# Patient Record
Sex: MALE | Race: WHITE | NOT HISPANIC OR LATINO | Employment: OTHER | URBAN - NONMETROPOLITAN AREA
[De-identification: names, ages, dates, MRNs, and addresses within clinical notes are randomized per-mention and may not be internally consistent; named-entity substitution may affect disease eponyms.]

---

## 2017-01-27 ENCOUNTER — COMMUNICATION - GICH (OUTPATIENT)
Dept: INTERNAL MEDICINE | Facility: OTHER | Age: 62
End: 2017-01-27

## 2017-01-27 DIAGNOSIS — I10 ESSENTIAL (PRIMARY) HYPERTENSION: ICD-10-CM

## 2017-02-09 ENCOUNTER — COMMUNICATION - GICH (OUTPATIENT)
Dept: INTERNAL MEDICINE | Facility: OTHER | Age: 62
End: 2017-02-09

## 2017-02-09 DIAGNOSIS — Z79.4 LONG TERM CURRENT USE OF INSULIN (H): ICD-10-CM

## 2017-02-09 DIAGNOSIS — E11.65 TYPE 2 DIABETES MELLITUS WITH HYPERGLYCEMIA (H): ICD-10-CM

## 2017-02-15 ENCOUNTER — HISTORY (OUTPATIENT)
Dept: INTERNAL MEDICINE | Facility: OTHER | Age: 62
End: 2017-02-15

## 2017-02-15 ENCOUNTER — AMBULATORY - GICH (OUTPATIENT)
Dept: LAB | Facility: OTHER | Age: 62
End: 2017-02-15

## 2017-02-15 ENCOUNTER — OFFICE VISIT - GICH (OUTPATIENT)
Dept: INTERNAL MEDICINE | Facility: OTHER | Age: 62
End: 2017-02-15

## 2017-02-15 DIAGNOSIS — E78.5 HYPERLIPIDEMIA: ICD-10-CM

## 2017-02-15 DIAGNOSIS — E11.8 TYPE 2 DIABETES MELLITUS WITH COMPLICATIONS (H): ICD-10-CM

## 2017-02-15 DIAGNOSIS — E78.2 MIXED HYPERLIPIDEMIA: ICD-10-CM

## 2017-02-15 DIAGNOSIS — E11.319 TYPE 2 DIABETES MELLITUS WITH RETINOPATHY WITHOUT MACULAR EDEMA (H): ICD-10-CM

## 2017-02-15 DIAGNOSIS — Z79.4 LONG TERM CURRENT USE OF INSULIN (H): ICD-10-CM

## 2017-02-15 DIAGNOSIS — E55.9 VITAMIN D DEFICIENCY: ICD-10-CM

## 2017-02-15 DIAGNOSIS — R39.14 FEELING OF INCOMPLETE BLADDER EMPTYING: ICD-10-CM

## 2017-02-15 DIAGNOSIS — I10 ESSENTIAL (PRIMARY) HYPERTENSION: ICD-10-CM

## 2017-02-15 DIAGNOSIS — G60.9 HEREDITARY AND IDIOPATHIC NEUROPATHY: ICD-10-CM

## 2017-02-15 LAB
A/G RATIO - HISTORICAL: 1.3 (ref 1–2)
ALB RAND URINE - HISTORICAL: 8.4 MG/L
ALBUMIN SERPL-MCNC: 4.3 G/DL (ref 3.5–5.7)
ALP SERPL-CCNC: 69 IU/L (ref 34–104)
ALT (SGPT) - HISTORICAL: 18 IU/L (ref 7–52)
ANION GAP - HISTORICAL: 6 (ref 5–18)
AST SERPL-CCNC: 17 IU/L (ref 13–39)
BACTERIA URINE: NORMAL BACTERIA/HPF
BILIRUB SERPL-MCNC: 0.5 MG/DL (ref 0.3–1)
BILIRUB UR QL: NEGATIVE
BUN SERPL-MCNC: 18 MG/DL (ref 7–25)
BUN/CREAT RATIO - HISTORICAL: 19
CALCIUM SERPL-MCNC: 9.7 MG/DL (ref 8.6–10.3)
CHLORIDE SERPLBLD-SCNC: 105 MMOL/L (ref 98–107)
CHOL/HDL RATIO - HISTORICAL: 3.15
CHOLESTEROL TOTAL: 126 MG/DL
CLARITY, URINE: CLEAR CLARITY
CO2 SERPL-SCNC: 27 MMOL/L (ref 21–31)
COLOR UR: YELLOW COLOR
CREAT SERPL-MCNC: 0.97 MG/DL (ref 0.7–1.3)
CREATININE, URINE - HISTORICAL: 0.29 G/L
EPITHELIAL CELLS: NORMAL EPI/HPF
ERYTHROCYTE [DISTWIDTH] IN BLOOD BY AUTOMATED COUNT: 11.7 % (ref 11.5–15.5)
ESTIMATED AVERAGE GLUCOSE: 174 MG/DL
GFR IF NOT AFRICAN AMERICAN - HISTORICAL: >60 ML/MIN/1.73M2
GLOBULIN - HISTORICAL: 3.2 G/DL (ref 2–3.7)
GLUCOSE SERPL-MCNC: 163 MG/DL (ref 70–105)
GLUCOSE URINE: NEGATIVE MG/DL
HCT VFR BLD AUTO: 46.1 % (ref 37–53)
HDLC SERPL-MCNC: 40 MG/DL (ref 23–92)
HEMOGLOBIN A1C MONITORING (POCT) - HISTORICAL: 7.7 % (ref 4–6.2)
HEMOGLOBIN: 15.4 G/DL (ref 13.5–17.5)
KETONES UR QL: NEGATIVE MG/DL
LDLC SERPL CALC-MCNC: 73 MG/DL
LEUKOCYTE ESTERASE URINE: NEGATIVE
MCH RBC QN AUTO: 29.8 PG (ref 26–34)
MCHC RBC AUTO-ENTMCNC: 33.5 G/DL (ref 32–36)
MCV RBC AUTO: 89 FL (ref 80–100)
MICROALBUMIN, RAND UR - HISTORICAL: 29 MG/G CREAT
NITRITE UR QL STRIP: NEGATIVE
NON-HDL CHOLESTEROL - HISTORICAL: 86 MG/DL
OCCULT BLOOD,URINE - HISTORICAL: ABNORMAL
PATIENT STATUS - HISTORICAL: NORMAL
PH UR: 5.5 [PH]
PLATELET # BLD AUTO: 185 THOU/CU MM (ref 140–440)
PMV BLD: 9.3 FL (ref 6.5–11)
POTASSIUM SERPL-SCNC: 4.5 MMOL/L (ref 3.5–5.1)
PROT SERPL-MCNC: 7.5 G/DL (ref 6.4–8.9)
PROTEIN QUALITATIVE,URINE - HISTORICAL: NEGATIVE MG/DL
PSA TOTAL (DIAGNOSTIC) - HISTORICAL: 1.2 NG/ML
RBC - HISTORICAL: NORMAL /HPF
RED BLOOD COUNT - HISTORICAL: 5.18 MIL/CU MM (ref 4.3–5.9)
SODIUM SERPL-SCNC: 138 MMOL/L (ref 133–143)
SP GR UR STRIP: 1.01
TRIGL SERPL-MCNC: 64 MG/DL
UROBILINOGEN,QUALITATIVE - HISTORICAL: NORMAL EU/DL
WBC - HISTORICAL: NORMAL /HPF
WHITE BLOOD COUNT - HISTORICAL: 7.9 THOU/CU MM (ref 4.5–11)

## 2017-03-10 ENCOUNTER — COMMUNICATION - GICH (OUTPATIENT)
Dept: INTERNAL MEDICINE | Facility: OTHER | Age: 62
End: 2017-03-10

## 2017-03-10 DIAGNOSIS — E11.65 TYPE 2 DIABETES MELLITUS WITH HYPERGLYCEMIA (H): ICD-10-CM

## 2017-03-10 DIAGNOSIS — Z79.4 LONG TERM CURRENT USE OF INSULIN (H): ICD-10-CM

## 2017-05-22 ENCOUNTER — AMBULATORY - GICH (OUTPATIENT)
Dept: LAB | Facility: OTHER | Age: 62
End: 2017-05-22

## 2017-05-22 DIAGNOSIS — E11.319 TYPE 2 DIABETES MELLITUS WITH RETINOPATHY WITHOUT MACULAR EDEMA (H): ICD-10-CM

## 2017-05-22 LAB
A/G RATIO - HISTORICAL: 1.6 (ref 1–2)
ALBUMIN SERPL-MCNC: 4.4 G/DL (ref 3.5–5.7)
ALP SERPL-CCNC: 75 IU/L (ref 34–104)
ALT (SGPT) - HISTORICAL: 17 IU/L (ref 7–52)
ANION GAP - HISTORICAL: 10 (ref 5–18)
AST SERPL-CCNC: 18 IU/L (ref 13–39)
BILIRUB SERPL-MCNC: 0.7 MG/DL (ref 0.3–1)
BUN SERPL-MCNC: 18 MG/DL (ref 7–25)
BUN/CREAT RATIO - HISTORICAL: 18
CALCIUM SERPL-MCNC: 9.5 MG/DL (ref 8.6–10.3)
CHLORIDE SERPLBLD-SCNC: 102 MMOL/L (ref 98–107)
CO2 SERPL-SCNC: 27 MMOL/L (ref 21–31)
CREAT SERPL-MCNC: 0.99 MG/DL (ref 0.7–1.3)
ERYTHROCYTE [DISTWIDTH] IN BLOOD BY AUTOMATED COUNT: 13.1 % (ref 11.5–15.5)
ESTIMATED AVERAGE GLUCOSE: 177 MG/DL
GFR IF NOT AFRICAN AMERICAN - HISTORICAL: >60 ML/MIN/1.73M2
GLOBULIN - HISTORICAL: 2.8 G/DL (ref 2–3.7)
GLUCOSE SERPL-MCNC: 92 MG/DL (ref 70–105)
HCT VFR BLD AUTO: 44.2 % (ref 37–53)
HEMOGLOBIN A1C MONITORING (POCT) - HISTORICAL: 7.8 % (ref 4–6.2)
HEMOGLOBIN: 15.7 G/DL (ref 13.5–17.5)
MCH RBC QN AUTO: 30.9 PG (ref 26–34)
MCHC RBC AUTO-ENTMCNC: 35.5 G/DL (ref 32–36)
MCV RBC AUTO: 87 FL (ref 80–100)
PLATELET # BLD AUTO: 234 THOU/CU MM (ref 140–440)
PMV BLD: 10.4 FL (ref 6.5–11)
POTASSIUM SERPL-SCNC: 4.2 MMOL/L (ref 3.5–5.1)
PROT SERPL-MCNC: 7.2 G/DL (ref 6.4–8.9)
RED BLOOD COUNT - HISTORICAL: 5.08 MIL/CU MM (ref 4.3–5.9)
SODIUM SERPL-SCNC: 139 MMOL/L (ref 133–143)
WHITE BLOOD COUNT - HISTORICAL: 9.6 THOU/CU MM (ref 4.5–11)

## 2017-05-24 ENCOUNTER — HISTORY (OUTPATIENT)
Dept: INTERNAL MEDICINE | Facility: OTHER | Age: 62
End: 2017-05-24

## 2017-05-24 ENCOUNTER — OFFICE VISIT - GICH (OUTPATIENT)
Dept: INTERNAL MEDICINE | Facility: OTHER | Age: 62
End: 2017-05-24

## 2017-05-24 DIAGNOSIS — E11.8 TYPE 2 DIABETES MELLITUS WITH COMPLICATIONS (H): ICD-10-CM

## 2017-05-24 DIAGNOSIS — E78.2 MIXED HYPERLIPIDEMIA: ICD-10-CM

## 2017-05-24 DIAGNOSIS — Z79.4 LONG TERM CURRENT USE OF INSULIN (H): ICD-10-CM

## 2017-05-24 DIAGNOSIS — E55.9 VITAMIN D DEFICIENCY: ICD-10-CM

## 2017-05-24 DIAGNOSIS — E11.319 TYPE 2 DIABETES MELLITUS WITH RETINOPATHY WITHOUT MACULAR EDEMA (H): ICD-10-CM

## 2017-05-24 ASSESSMENT — PATIENT HEALTH QUESTIONNAIRE - PHQ9: SUM OF ALL RESPONSES TO PHQ QUESTIONS 1-9: 0

## 2017-08-29 ENCOUNTER — COMMUNICATION - GICH (OUTPATIENT)
Dept: INTERNAL MEDICINE | Facility: OTHER | Age: 62
End: 2017-08-29

## 2017-08-29 ENCOUNTER — AMBULATORY - GICH (OUTPATIENT)
Dept: LAB | Facility: OTHER | Age: 62
End: 2017-08-29

## 2017-08-29 DIAGNOSIS — I10 ESSENTIAL (PRIMARY) HYPERTENSION: ICD-10-CM

## 2017-08-29 DIAGNOSIS — E78.2 MIXED HYPERLIPIDEMIA: ICD-10-CM

## 2017-08-29 DIAGNOSIS — E11.8 TYPE 2 DIABETES MELLITUS WITH COMPLICATIONS (H): ICD-10-CM

## 2017-08-29 DIAGNOSIS — E55.9 VITAMIN D DEFICIENCY: ICD-10-CM

## 2017-08-29 DIAGNOSIS — Z79.4 LONG TERM CURRENT USE OF INSULIN (H): ICD-10-CM

## 2017-08-29 LAB
A/G RATIO - HISTORICAL: 1.4 (ref 1–2)
ABSOLUTE BASOPHILS - HISTORICAL: 0.1 THOU/CU MM
ABSOLUTE EOSINOPHILS - HISTORICAL: 0.2 THOU/CU MM
ABSOLUTE IMMATURE GRANULOCYTES(METAS,MYELOS,PROS) - HISTORICAL: 0 THOU/CU MM
ABSOLUTE LYMPHOCYTES - HISTORICAL: 3 THOU/CU MM (ref 0.9–2.9)
ABSOLUTE MONOCYTES - HISTORICAL: 0.6 THOU/CU MM
ABSOLUTE NEUTROPHILS - HISTORICAL: 3.6 THOU/CU MM (ref 1.7–7)
ALBUMIN SERPL-MCNC: 4.2 G/DL (ref 3.5–5.7)
ALP SERPL-CCNC: 81 IU/L (ref 34–104)
ALT (SGPT) - HISTORICAL: 17 IU/L (ref 7–52)
ANION GAP - HISTORICAL: 14 (ref 5–18)
AST SERPL-CCNC: 19 IU/L (ref 13–39)
BASOPHILS # BLD AUTO: 0.8 %
BILIRUB SERPL-MCNC: 0.6 MG/DL (ref 0.3–1)
BUN SERPL-MCNC: 13 MG/DL (ref 7–25)
BUN/CREAT RATIO - HISTORICAL: 13
CALCIUM SERPL-MCNC: 9.7 MG/DL (ref 8.6–10.3)
CHLORIDE SERPLBLD-SCNC: 101 MMOL/L (ref 98–107)
CO2 SERPL-SCNC: 25 MMOL/L (ref 21–31)
CREAT SERPL-MCNC: 0.98 MG/DL (ref 0.7–1.3)
EOSINOPHIL NFR BLD AUTO: 2.7 %
ERYTHROCYTE [DISTWIDTH] IN BLOOD BY AUTOMATED COUNT: 13.3 % (ref 11.5–15.5)
ESTIMATED AVERAGE GLUCOSE: 177 MG/DL
GFR IF NOT AFRICAN AMERICAN - HISTORICAL: >60 ML/MIN/1.73M2
GLOBULIN - HISTORICAL: 3.1 G/DL (ref 2–3.7)
GLUCOSE SERPL-MCNC: 210 MG/DL (ref 70–105)
HCT VFR BLD AUTO: 44.8 % (ref 37–53)
HEMOGLOBIN A1C MONITORING (POCT) - HISTORICAL: 7.8 % (ref 4–6.2)
HEMOGLOBIN: 15 G/DL (ref 13.5–17.5)
IMMATURE GRANULOCYTES(METAS,MYELOS,PROS) - HISTORICAL: 0.1 %
LYMPHOCYTES NFR BLD AUTO: 40.6 % (ref 20–44)
MCH RBC QN AUTO: 29.5 PG (ref 26–34)
MCHC RBC AUTO-ENTMCNC: 33.5 G/DL (ref 32–36)
MCV RBC AUTO: 88 FL (ref 80–100)
MONOCYTES NFR BLD AUTO: 7.7 %
NEUTROPHILS NFR BLD AUTO: 48.1 % (ref 42–72)
PLATELET # BLD AUTO: 218 THOU/CU MM (ref 140–440)
PMV BLD: 11.6 FL (ref 6.5–11)
POTASSIUM SERPL-SCNC: 4.6 MMOL/L (ref 3.5–5.1)
PROT SERPL-MCNC: 7.3 G/DL (ref 6.4–8.9)
RED BLOOD COUNT - HISTORICAL: 5.08 MIL/CU MM (ref 4.3–5.9)
SODIUM SERPL-SCNC: 140 MMOL/L (ref 133–143)
WHITE BLOOD COUNT - HISTORICAL: 7.4 THOU/CU MM (ref 4.5–11)

## 2017-08-30 ENCOUNTER — OFFICE VISIT - GICH (OUTPATIENT)
Dept: INTERNAL MEDICINE | Facility: OTHER | Age: 62
End: 2017-08-30

## 2017-08-30 ENCOUNTER — HISTORY (OUTPATIENT)
Dept: INTERNAL MEDICINE | Facility: OTHER | Age: 62
End: 2017-08-30

## 2017-08-30 DIAGNOSIS — E55.9 VITAMIN D DEFICIENCY: ICD-10-CM

## 2017-08-30 DIAGNOSIS — E11.8 TYPE 2 DIABETES MELLITUS WITH COMPLICATIONS (H): ICD-10-CM

## 2017-08-30 DIAGNOSIS — E78.5 HYPERLIPIDEMIA: ICD-10-CM

## 2017-08-30 DIAGNOSIS — E11.319 TYPE 2 DIABETES MELLITUS WITH RETINOPATHY WITHOUT MACULAR EDEMA (H): ICD-10-CM

## 2017-08-30 DIAGNOSIS — Z79.4 LONG TERM CURRENT USE OF INSULIN (H): ICD-10-CM

## 2017-08-30 DIAGNOSIS — I10 ESSENTIAL (PRIMARY) HYPERTENSION: ICD-10-CM

## 2017-08-30 LAB
CHOL/HDL RATIO - HISTORICAL: 3.28
CHOLESTEROL TOTAL: 131 MG/DL
HDLC SERPL-MCNC: 40 MG/DL (ref 23–92)
LDLC SERPL CALC-MCNC: 68 MG/DL
NON-HDL CHOLESTEROL - HISTORICAL: 91 MG/DL
PATIENT STATUS - HISTORICAL: NORMAL
TRIGL SERPL-MCNC: 115 MG/DL

## 2017-08-30 ASSESSMENT — PATIENT HEALTH QUESTIONNAIRE - PHQ9: SUM OF ALL RESPONSES TO PHQ QUESTIONS 1-9: 0

## 2017-08-31 ENCOUNTER — AMBULATORY - GICH (OUTPATIENT)
Dept: SCHEDULING | Facility: OTHER | Age: 62
End: 2017-08-31

## 2017-09-27 ENCOUNTER — AMBULATORY - GICH (OUTPATIENT)
Dept: SCHEDULING | Facility: OTHER | Age: 62
End: 2017-09-27

## 2017-11-28 ENCOUNTER — TRANSFERRED RECORDS (OUTPATIENT)
Dept: MULTI SPECIALTY CLINIC | Facility: CLINIC | Age: 62
End: 2017-11-28

## 2017-11-28 ENCOUNTER — AMBULATORY - GICH (OUTPATIENT)
Dept: SCHEDULING | Facility: OTHER | Age: 62
End: 2017-11-28

## 2017-12-06 ENCOUNTER — AMBULATORY - GICH (OUTPATIENT)
Dept: LAB | Facility: OTHER | Age: 62
End: 2017-12-06

## 2017-12-06 DIAGNOSIS — E11.8 TYPE 2 DIABETES MELLITUS WITH COMPLICATIONS (H): ICD-10-CM

## 2017-12-06 DIAGNOSIS — Z79.4 LONG TERM CURRENT USE OF INSULIN (H): ICD-10-CM

## 2017-12-06 DIAGNOSIS — E78.2 MIXED HYPERLIPIDEMIA: ICD-10-CM

## 2017-12-06 DIAGNOSIS — I10 ESSENTIAL (PRIMARY) HYPERTENSION: ICD-10-CM

## 2017-12-06 LAB
A/G RATIO - HISTORICAL: 1.3 (ref 1–2)
ALBUMIN SERPL-MCNC: 4.2 G/DL (ref 3.5–5.7)
ALP SERPL-CCNC: 75 IU/L (ref 34–104)
ALT (SGPT) - HISTORICAL: 22 IU/L (ref 7–52)
ANION GAP - HISTORICAL: 6 (ref 5–18)
AST SERPL-CCNC: 22 IU/L (ref 13–39)
BILIRUB SERPL-MCNC: 0.5 MG/DL (ref 0.3–1)
BUN SERPL-MCNC: 16 MG/DL (ref 7–25)
BUN/CREAT RATIO - HISTORICAL: 16
CALCIUM SERPL-MCNC: 9.1 MG/DL (ref 8.6–10.3)
CHLORIDE SERPLBLD-SCNC: 104 MMOL/L (ref 98–107)
CHOL/HDL RATIO - HISTORICAL: 3.17
CHOLESTEROL TOTAL: 114 MG/DL
CO2 SERPL-SCNC: 27 MMOL/L (ref 21–31)
CREAT SERPL-MCNC: 1.01 MG/DL (ref 0.7–1.3)
ERYTHROCYTE [DISTWIDTH] IN BLOOD BY AUTOMATED COUNT: 13 % (ref 11.5–15.5)
ESTIMATED AVERAGE GLUCOSE: 160 MG/DL
GFR IF NOT AFRICAN AMERICAN - HISTORICAL: >60 ML/MIN/1.73M2
GLOBULIN - HISTORICAL: 3.2 G/DL (ref 2–3.7)
GLUCOSE SERPL-MCNC: 119 MG/DL (ref 70–105)
HCT VFR BLD AUTO: 46.7 % (ref 37–53)
HDLC SERPL-MCNC: 36 MG/DL (ref 23–92)
HEMOGLOBIN A1C MONITORING (POCT) - HISTORICAL: 7.2 % (ref 4–6.2)
HEMOGLOBIN: 16 G/DL (ref 13.5–17.5)
LDLC SERPL CALC-MCNC: 63 MG/DL
MCH RBC QN AUTO: 30.2 PG (ref 26–34)
MCHC RBC AUTO-ENTMCNC: 34.3 G/DL (ref 32–36)
MCV RBC AUTO: 88 FL (ref 80–100)
NON-HDL CHOLESTEROL - HISTORICAL: 78 MG/DL
PLATELET # BLD AUTO: 231 THOU/CU MM (ref 140–440)
PMV BLD: 10.4 FL (ref 6.5–11)
POTASSIUM SERPL-SCNC: 4.5 MMOL/L (ref 3.5–5.1)
PROT SERPL-MCNC: 7.4 G/DL (ref 6.4–8.9)
PROVIDER ORDERDED STATUS - HISTORICAL: NORMAL
RED BLOOD COUNT - HISTORICAL: 5.29 MIL/CU MM (ref 4.3–5.9)
SODIUM SERPL-SCNC: 137 MMOL/L (ref 133–143)
TRIGL SERPL-MCNC: 74 MG/DL
WHITE BLOOD COUNT - HISTORICAL: 7.7 THOU/CU MM (ref 4.5–11)

## 2017-12-07 ENCOUNTER — COMMUNICATION - GICH (OUTPATIENT)
Dept: INTERNAL MEDICINE | Facility: OTHER | Age: 62
End: 2017-12-07

## 2017-12-07 ENCOUNTER — HISTORY (OUTPATIENT)
Dept: INTERNAL MEDICINE | Facility: OTHER | Age: 62
End: 2017-12-07

## 2017-12-07 ENCOUNTER — OFFICE VISIT - GICH (OUTPATIENT)
Dept: INTERNAL MEDICINE | Facility: OTHER | Age: 62
End: 2017-12-07

## 2017-12-07 DIAGNOSIS — E11.319 TYPE 2 DIABETES MELLITUS WITH RETINOPATHY WITHOUT MACULAR EDEMA (H): ICD-10-CM

## 2017-12-07 DIAGNOSIS — I10 ESSENTIAL (PRIMARY) HYPERTENSION: ICD-10-CM

## 2017-12-07 DIAGNOSIS — E11.8 TYPE 2 DIABETES MELLITUS WITH COMPLICATIONS (H): ICD-10-CM

## 2017-12-07 DIAGNOSIS — E78.2 MIXED HYPERLIPIDEMIA: ICD-10-CM

## 2017-12-07 DIAGNOSIS — E55.9 VITAMIN D DEFICIENCY: ICD-10-CM

## 2017-12-07 DIAGNOSIS — Z79.4 LONG TERM CURRENT USE OF INSULIN (H): ICD-10-CM

## 2017-12-28 NOTE — TELEPHONE ENCOUNTER
Patient Information     Patient Name MRN Sex Oh Mcneal [id number] Male 1955      Telephone Encounter by Stephenie Varghese at 2017 11:30 AM     Author:  Stephenie Varghese Service:  (none) Author Type:  (none)     Filed:  2017 11:30 AM Encounter Date:  2017 Status:  Signed     :  Stephenie Varghese            Lab orders pending.    Stephenie Varghese LPN        2017 11:30 AM

## 2017-12-28 NOTE — PROGRESS NOTES
Patient Information     Patient Name MRN Sex Oh Mcneal [id number] Male 1955      Progress Notes by Moshe Wilson MD at 2017  8:00 AM     Author:  Moshe Wilson MD Service:  (none) Author Type:  Physician     Filed:  2017  9:38 AM Encounter Date:  2017 Status:  Signed     :  Moshe Wilson MD (Physician)            Nursing Notes:   Stephenie Varghese  2017  8:14 AM  Signed  Previous A1C is at goal of <8  HEMOGLOBIN A1C MONITORING (POCT)    Date Value   2017 7.8 % (H)   04/10/2013 6.5 % NGSP (H)     HEMOGLOBIN A1C SCREENING (% Total Hgb)    Date Value   04/10/2014 6.9 (H)     Urine microalbumin:creatine: 0.29  Foot exam with in the past year  Eye exam this past year    Patient is not a current smoker  Patient is on a daily aspirin  Patient is on a Statin.  Blood pressure today of   is not at the goal of <139/89 for diabetics.    Stephenie Varghese LPN..............2017 8:00 AM    Oh Delarosa presents to clinic today for:   Chief Complaint    Patient presents with      Diabetes     HPI: Mr. Delarosa is a 62 y.o. male who presents today for evaluation of above.     (E11.8,  Z79.4) Controlled type 2 diabetes mellitus with complication, with long-term current use of insulin (HC)  (primary encounter diagnosis)  (E78.5) Hyperlipidemia, unspecified hyperlipidemia type  (E11.319) Diabetic retinopathy of left eye associated with type 2 diabetes mellitus, macular edema presence unspecified, unspecified retinopathy severity  (E55.9) Vitamin D deficiency  (I10) Hypertension     Diabetes, currently controlled.  He is only using 15 units of Lantus however.  Advised that he increases up to 18 units for about a week and then 20 units.  A1c is at the upper range of goal, 7.8%.  Her hyperlipidemia, last lipid panel was very well controlled.  He is taking Lipitor 40 mg daily.  Tolerating this well.    Retinopathy, noted that his last diabetic examination, reports this was  stable.    Vitamin D deficiency, taking oral vitamin D.  Labs came back with improved vitamin D levels.    Hypertension, currently well controlled.  Tolerating medications.  Labs obtained yesterday showed kidney function is stable.    Mr. Parks Body mass index is 28.27 kg/(m^2). This is out of the normal range for a 62 y.o. Normal range for ages 18+ is between 18.5 and 24.9. To lose weight we reviewed risks and benefits of appropriate options such as diet, exercise, and medications. Patient's strategy will be  self-directed nutrition plan and self-directed exercise program   BP Readings from Last 1 Encounters:08/30/17 : 138/80  Mr. Parks blood pressure is out of the normal range for adults. Per JNC-8 guidelines normal adult blood pressure is < 120/80, pre-hypertensive is between 120/80 and 139/89, and hypertension is 140/90 or greater. Risks of hypertension were discussed. Patient's strategy will be weight loss, increased activity and reduced salt intake    Functional Capacity: > 4 METS.   Reports that he can climb a flight of stairs without any chest pain/heaviness or shortness of breath.   Patient reports no current symptoms of fevers, chills, nausea/vomiting.   No cough. No shortness of breath.   No change in bowel/bladder habits. No melena, hematochezia. No Hematuria.   No rashes. No palpitations.  No orthopnea/paroxysmal nocturnal dyspnea   No new vision or hearing issues.   No significant mood issues   No bruising.     SHIRLENE:  No flowsheet data found.    PHQ9:  PHQ Depression Screening 5/24/2017 8/30/2017   Date of PHQ exam (doc flow) 5/24/2017 8/30/2017   1. Lack of interest/pleasure 0 - Not at all 0 - Not at all   2. Feeling down/depressed 0 - Not at all 0 - Not at all   PHQ-2 TOTAL SCORE 0 0   3. Trouble sleeping 0 - Not at all 0 - Not at all   4. Decreased energy 0 - Not at all 0 - Not at all   5. Appetite change 0 - Not at all 0 - Not at all   6. Feelings of failure 0 - Not at all 0 - Not at all   7.  Trouble concentrating 0 - Not at all 0 - Not at all   8. Activity level 0 - Not at all 0 - Not at all   9. Hurting yourself 0 - Not at all 0 - Not at all   PHQ-9 TOTAL SCORE 0 0   PHQ-9 Severity Level none none   Functional Impairment not difficult at all not difficult at all   Some recent data might be hidden          I have personally reviewed the past medical history, past surgical history, medications, allergies, family and social history as listed below, on 8/30/2017.    Patient Active Problem List      Diagnosis Date Noted     Controlled type 2 diabetes mellitus with complication, with long-term current use of insulin (HC) 10/25/2016     Chronic back pain 04/30/2015     h/o Leiomyoma of the skin of right breast 04/29/2015     Strain of flexor muscle of right hip 12/17/2014     Actinic keratosis of right temple 12/17/2014     History of nephrolithiasis - about 2013 06/24/2014     Hyperparathyroidism () 06/24/2014     Fatigue 05/06/2014     H/O hyperparathyroidism 04/10/2014     S/P subtotal parathyroidectomy () 04/10/2014     Hypertension 04/10/2014     Vitamin D deficiency 04/10/2014     Increased PTH level 04/10/2014     Diabetic retinopathy associated with type 2 diabetes mellitus () 11/25/2013     Lung nodule 11/25/2013     HEMATURIA UNSPECIFIED 07/18/2012     HYPERLIPIDEMIA 01/03/2011     PERIPHERAL NEUROPATHY      Past Medical History:     Diagnosis  Date     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       atorvastatin (LIPITOR) 40 mg tablet Take 1 tablet by mouth once daily. 90 tablet 3     blood sugar diagnostic (ACCU-CHEK JAMES PLUS TEST STRP) strip Dispense item covered by pt ins. E11.65 IDDM type II, uncontrolled -  "Test 4 times/day. Reason: High A1C 400 Each 3     Blood-Glucose Meter (ACCU-CHEK JAMES PLUS METER) Dispense glucose meter, test strips and lancets covered by the patient insurance. Test 4 times per day. 1 Device 0     CALCIUM CARBONATE/VITAMIN D2 (CALCIUM 500 WITH VITAMIN D ORAL) Take  by mouth once daily. Take two tablets by mouth daily       cholecalciferol (VITAMIN D-3) 2,000 unit capsule 4000 to 5000 IU daily For Vitamin D Deficiency 100 capsule 3     glipiZIDE (GLUCOTROL) 10 mg tablet Take 1 tablet by mouth 2 times daily before meals. 180 tablet 3     glipiZIDE-metFORMIN (METAGLIP) 2.5-250 mg per tablet Take 1 tablet by mouth once daily with a meal. -- With Supper Every Evening 90 tablet 3     insulin glargine (LANTUS SOLOSTAR) 100 unit/mL (3 mL) pen Inject 15-25 Units subcutaneous before bedtime. 20 pen 3     lancets Test 4 times per day. Dx E11.65 Insulin Dependent 400 Each 4     losartan (COZAAR) 50 mg tablet Take 1 tablet by mouth once daily. 90 tablet 3     metFORMIN (GLUCOPHAGE) 1,000 mg tablet Take 1 tablet by mouth 2 times daily with meals. 180 tablet 3     MARJORIE PEN NEEDLE 32 gauge x 5/32\" AS DIRECTED FOR ADMINISTERING INSULIN AT HOME. FOR USE WITH LANTUS INJECTIONS ONCE DAILY. 100 Each 4     Allergies     Allergen  Reactions     Lisinopril Cough     Family History       Problem   Relation Age of Onset     Heart Disease  Father       at age 73, CAD, complications of percutaneous revascularization of abdominal aortic aneurysm        Good Health  Mother      83       Alcohol/Drug  Brother      Alcoholism        Family Status     Relation  Status     Father  at age 73     of CAD, complications of percutaneous revascularization of abdominal aortic aneurysm       Mother Alive    1927      Sister Alive    obese      Sister Alive    obese      Sister Alive    obese      Brother Alive    born , no contact      Brother      Social History     Social History        Marital status:       " "Spouse name: N/A     Number of children:  N/A     Years of education:  N/A     Social History Main Topics       Smoking status: Never Smoker     Smokeless tobacco: Never Used     Alcohol use No     Drug use: Not on file     Sexual activity: Not on file     Other Topics  Concern     Not on file      Social History Narrative     Never a smoker.  Walks regularly,      Pertinent ROS was performed and was negative as noted in HPI above.     EXAM:   Vitals:     08/30/17 0803   BP: 138/80   Pulse: 76   Weight: 89.4 kg (197 lb)     BP Readings from Last 3 Encounters:    08/30/17 138/80   05/24/17 134/88   02/15/17 138/88     Wt Readings from Last 3 Encounters:    08/30/17 89.4 kg (197 lb)   05/24/17 87.7 kg (193 lb 4 oz)   02/15/17 89.4 kg (197 lb 3.2 oz)     Estimated body mass index is 28.27 kg/(m^2) as calculated from the following:    Height as of 5/24/17: 1.778 m (5' 10\").    Weight as of this encounter: 89.4 kg (197 lb).     EXAM:  Constitutional: Pleasant, alert, appropriate appearance for age. No acute distress  ENT: Normocephalic, Atraumatic, Thyroid without nodules or tenderness   Nose/Mouth: Oral pharynx without erythema or exudates, Nose is patent bilaterally, no rhinorrhea and Dental hygeine adequate   Eyes:  Extraocular muscles intact, Sclera non-icteric, Conjunctiva without erythema  Lymphatic Exam: Non-palpable nodes in neck, clavicular regions  Pulmonary: Lungs are clear to auscultation bilaterally, without wheezes or crackles  Cardiovascular Exam: regular rate and rhythm, no pedal edema  Gastrointestinal Exam: Soft, non-tender, non-distended, positive bowel sounds  Integument: No abnormal rashes, sores, or ulcerations noted  Neurologic Exam: CN 3-12 grossly intact   Musculoskeletal Exam: Moves upper and lower extremities symmetrically, No focal weakness  Gait and station appear grossly normal  Psychiatric Exam: Awake and Alert, Affect and mood appropriate  Speech is fluent, Thought process is " normal    INVESTIGATIONS:  Results for orders placed or performed in visit on 08/29/17      Hgb A1c      Result  Value Ref Range    HEMOGLOBIN A1C MONITORING (POCT) 7.8 (H) 4.0 - 6.2 %    ESTIMATED AVERAGE GLUCOSE  177 mg/dL   COMPLETE METABOLIC PANEL      Result  Value Ref Range    SODIUM 140 133 - 143 mmol/L    POTASSIUM 4.6 3.5 - 5.1 mmol/L    CHLORIDE 101 98 - 107 mmol/L    CO2,TOTAL 25 21 - 31 mmol/L    ANION GAP 14 5 - 18                    GLUCOSE 210 (H) 70 - 105 mg/dL    CALCIUM 9.7 8.6 - 10.3 mg/dL    BUN 13 7 - 25 mg/dL    CREATININE 0.98 0.70 - 1.30 mg/dL    BUN/CREAT RATIO           13                    GFR if African American >60 >60 ml/min/1.73m2    GFR if not African American >60 >60 ml/min/1.73m2    ALBUMIN 4.2 3.5 - 5.7 g/dL    PROTEIN,TOTAL 7.3 6.4 - 8.9 g/dL    GLOBULIN                  3.1 2.0 - 3.7 g/dL    A/G RATIO 1.4 1.0 - 2.0                    BILIRUBIN,TOTAL 0.6 0.3 - 1.0 mg/dL    ALK PHOSPHATASE 81 34 - 104 IU/L    ALT (SGPT) 17 7 - 52 IU/L    AST (SGOT) 19 13 - 39 IU/L   CBC WITH AUTO DIFFERENTIAL      Result  Value Ref Range    WHITE BLOOD COUNT         7.4 4.5 - 11.0 thou/cu mm    RED BLOOD COUNT           5.08 4.30 - 5.90 mil/cu mm    HEMOGLOBIN                15.0 13.5 - 17.5 g/dL    HEMATOCRIT                44.8 37.0 - 53.0 %    MCV                       88 80 - 100 fL    MCH                       29.5 26.0 - 34.0 pg    MCHC                      33.5 32.0 - 36.0 g/dL    RDW                       13.3 11.5 - 15.5 %    PLATELET COUNT            218 140 - 440 thou/cu mm    MPV                       11.6 (H) 6.5 - 11.0 fL    NEUTROPHILS               48.1 42.0 - 72.0 %    LYMPHOCYTES               40.6 20.0 - 44.0 %    MONOCYTES                 7.7 <12.0 %    EOSINOPHILS               2.7 <8.0 %    BASOPHILS                 0.8 <3.0 %    IMMATURE GRANULOCYTES(METAS,MYELOS,PROS) 0.1 %    ABSOLUTE NEUTROPHILS      3.6 1.7 - 7.0 thou/cu mm    ABSOLUTE LYMPHOCYTES      3.0 (H) 0.9 - 2.9  thou/cu mm    ABSOLUTE MONOCYTES        0.6 <0.9 thou/cu mm    ABSOLUTE EOSINOPHILS      0.2 <0.5 thou/cu mm    ABSOLUTE BASOPHILS        0.1 <0.3 thou/cu mm    ABSOLUTE IMMATURE GRANULOCYTES(METAS,MYELOS,PROS) 0.0 <=0.3 thou/cu mm   LIPID PANEL      Result  Value Ref Range    CHOLESTEROL,TOTAL 131 <200 mg/dL    TRIGLYCERIDES 115 <150 mg/dL    HDL CHOLESTEROL 40 23 - 92 mg/dL    NON-HDL CHOLESTEROL 91 <145 mg/dl    CHOL/HDL RATIO            3.28 <4.50                    LDL CHOLESTEROL 68 <100 mg/dL    PATIENT STATUS            NON-FASTING                       ASSESSMENT AND PLAN:  Oh was seen today for diabetes.    Diagnoses and all orders for this visit:    Controlled type 2 diabetes mellitus with complication, with long-term current use of insulin (HC)    Hyperlipidemia, unspecified hyperlipidemia type  -     atorvastatin (LIPITOR) 40 mg tablet; Take 1 tablet by mouth once daily.    Diabetic retinopathy of left eye associated with type 2 diabetes mellitus, macular edema presence unspecified, unspecified retinopathy severity    Vitamin D deficiency    Hypertension    lab results and schedule of future lab studies reviewed with patient, reviewed diet, exercise and weight control, recommended sodium restriction, cardiovascular risk and specific lipid/LDL goals reviewed, specific diabetic recommendations low cholesterol diet, weight control and daily exercise discussed, home glucose monitoring emphasized, foot care discussed and Podiatry visits discussed, annual eye examinations at Ophthalmology discussed, glycohemoglobin and other lab monitoring discussed and long term diabetic complications discussed, use of aspirin to prevent MI and TIA's discussed    -- Expected clinical course discussed   -- Medications and their side effects discussed    Oh is also recommended to eat a heart-healthy diet, do regular aerobic exercises, maintain a desirable body weight, and avoid tobacco products. These recommendations are  from the American Heart Association (AHA) which stresses the importance of lifestyle changes to lower cardiovascular disease risk.     Return in about 3 months (around 2017) for -- labs in 91+ days with Diabetes clinic appointment with Dr. Wilson 1-2 days later..    Patient Instructions     Last cholesterol looked VERY Good!  Last Lipids:  Chol: 2/15/2017 126  T/15/2017 64  HDL: 2/15/2017 40   LDL: 2/15/2017 73    Blood pressures are controlled.    Hemoglobin A1c is controlled but is still on the higher end of goal range.    Increase your Lantus up to 18 units every evening for about 1 week.    If tolerating increase up to 20 units after that.    Medications refilled.     Return for Diabetes labs and clinic follow-up appointment every 3 to 4 months.  --- (Go for about 91 to 100 days)    Schedule lab only appointment --- A few days AFTER: 17     Schedule clinic appointment with Dr. Wilson -- Same day as labs, or 1-2 days later.     Insurance companies are now grading you and I on your blood sugar control -- Goal is to get your A1c down to 7.9% or lower and NO Smoking!    -- Medicare and most insurance companies, will only cover Hemoglobin A1c labs to be rechecked every 91+ days.      HEMOGLOBIN A1C MONITORING (POCT)    Date Value   2017 7.8 % (H)   04/10/2013 6.5 % NGSP (H)     HEMOGLOBIN A1C SCREENING (% Total Hgb)    Date Value   04/10/2014 6.9 (H)        Next follow-up appointment with Dr. Wilson should be scheduled:  -- Approximately a few days AFTER: 17      Moshe Wilson MD

## 2017-12-29 NOTE — PATIENT INSTRUCTIONS
Patient Information     Patient Name MRN Sex Oh Mcneal [id number] Male 1955      Patient Instructions by Moshe Wilson MD at 2017  8:00 AM     Author:  Moshe Wilson MD  Service:  (none) Author Type:  Physician     Filed:  2017  8:19 AM  Encounter Date:  2017 Status:  Addendum     :  Moshe Wilson MD (Physician)        Related Notes: Original Note by Moshe Wilson MD (Physician) filed at 2017  8:17 AM            Last cholesterol looked VERY Good!  Last Lipids:  Chol: 2/15/2017 126  T/15/2017 64  HDL: 2/15/2017 40   LDL: 2/15/2017 73    Blood pressures are controlled.    Hemoglobin A1c is controlled but is still on the higher end of goal range.    Increase your Lantus up to 18 units every evening for about 1 week.    If tolerating increase up to 20 units after that.    Medications refilled.     Return for Diabetes labs and clinic follow-up appointment every 3 to 4 months.  --- (Go for about 91 to 100 days)    Schedule lab only appointment --- A few days AFTER: 17     Schedule clinic appointment with Dr. Wilson -- Same day as labs, or 1-2 days later.     Insurance companies are now grading you and I on your blood sugar control -- Goal is to get your A1c down to 7.9% or lower and NO Smoking!    -- Medicare and most insurance companies, will only cover Hemoglobin A1c labs to be rechecked every 91+ days.      HEMOGLOBIN A1C MONITORING (POCT)    Date Value   2017 7.8 % (H)   04/10/2013 6.5 % NGSP (H)     HEMOGLOBIN A1C SCREENING (% Total Hgb)    Date Value   04/10/2014 6.9 (H)        Next follow-up appointment with Dr. Wilson should be scheduled:  -- Approximately a few days AFTER: 17

## 2017-12-30 NOTE — NURSING NOTE
Patient Information     Patient Name MRN Sex Oh Mcneal [id number] Male 1955      Nursing Note by Stephenie Varghese at 2017  8:00 AM     Author:  Stephenie Varghese Service:  (none) Author Type:  (none)     Filed:  2017  8:14 AM Encounter Date:  2017 Status:  Signed     :  Stephenie Varghese            Previous A1C is at goal of <8  HEMOGLOBIN A1C MONITORING (POCT)    Date Value   2017 7.8 % (H)   04/10/2013 6.5 % NGSP (H)     HEMOGLOBIN A1C SCREENING (% Total Hgb)    Date Value   04/10/2014 6.9 (H)     Urine microalbumin:creatine: 0.29  Foot exam with in the past year  Eye exam this past year    Patient is not a current smoker  Patient is on a daily aspirin  Patient is on a Statin.  Blood pressure today of   is not at the goal of <139/89 for diabetics.    Stephenie Varghese LPN..............2017 8:00 AM

## 2018-01-03 NOTE — NURSING NOTE
Patient Information     Patient Name MRN Sex Oh Mcneal [id number] Male 1955      Nursing Note by Glynn Powell at 2/15/2017 12:50 PM     Author:  Glynn Powell Service:  (none) Author Type:  (none)     Filed:  2/15/2017 12:55 PM Encounter Date:  2/15/2017 Status:  Signed     :  Glynn Powell            Pt here today for medication management.  Glynn Powell LPN .............2/15/2017  12:49 PM

## 2018-01-03 NOTE — TELEPHONE ENCOUNTER
Patient Information     Patient Name MRN Sex Oh Mcneal [id number] Male 1955      Telephone Encounter by Jada Whitney at 2017 10:01 AM     Author:  Jada Whitney Service:  (none) Author Type:  NURS- Registered Nurse     Filed:  2017 10:03 AM Encounter Date:  2017 Status:  Signed     :  Jada Whitney (NURS- Registered Nurse)            Biguanides    Office visit in the past 12 months or per provider note.    Last visit with ELLIE ARIAS was on: 10/25/2016 in [facility]  Next visit with ELLIE ARIAS is on: No future appointment listed with this provider  Next visit with Internal Medicine is on: No future appointment listed in this department    Lab test requirements:  HgbA1c annually or per provider note.  HEMOGLOBIN A1C MONITORING (POCT)    Date Value   10/18/2016 7.5 % (H)   04/10/2013 6.5 % NGSP (H)     HEMOGLOBIN A1C SCREENING (% Total Hgb)    Date Value   04/10/2014 6.9 (H)     HEMOGLOBIN A1C GIH (%)    Date Value   2012 6.7 (H)       Max refill for 12 months from last office visit or per provider note.    If taking for polycystic ovary disease, may refill for 12 months.    Patient is due for medication management appointment. Limited refill provided at this time and letter sent for reminder to patient. Prescription refilled per RN Medication Refill Policy.................... Jada Whitney RN ....................  2017   10:02 AM

## 2018-01-03 NOTE — PROGRESS NOTES
Patient Information     Patient Name MRN Sex Oh Mcneal [id number] Male 1955      Progress Notes by Moshe Wilson MD at 2/15/2017 12:50 PM     Author:  Moshe Wilson MD Service:  (none) Author Type:  Physician     Filed:  2/15/2017  6:16 PM Encounter Date:  2/15/2017 Status:  Signed     :  Moshe Wilson MD (Physician)            Nursing Notes:   Glynn Powell  2/15/2017 12:55 PM  Signed  Pt here today for medication management.  Glynn Powell LPN .............2/15/2017  12:49 PM    Oh Delarosa presents to clinic today for:   Chief Complaint    Patient presents with      Medication Management     HPI: Mr. Delarosa is a 62 y.o. male who presents today for evaluation of above.     (E11.8,  Z79.4) Controlled type 2 diabetes mellitus with complication, with long-term current use of insulin (HC)  (primary encounter diagnosis)  (I10) Hypertension  (E78.5) Hyperlipidemia, unspecified hyperlipidemia type  (E11.319) Diabetic retinopathy associated with type 2 diabetes mellitus, macular edema presence unspecified, unspecified retinopathy severity (HC)  (G60.9) PERIPHERAL NEUROPATHY  (E55.9) Vitamin D deficiency     Diabetes, now controlled.  Checking blood sugars 3-4 times daily.  He'll get down as low as 78 up to 100 and 4 in the morning.  Starts to have mild hypoglycemia symptoms at around 75.  Typically runs higher blood sugars in the evening before supper.  He does not take any glipizide at lunch.    Due to high blood sugars right before suppertime, these are likely due to lunch time and eating without blood sugar medication management.  Start lunchtime glipizide 2.5 mg with metformin 250 mg.  New prescription sent to pharmacy.  HEMOGLOBIN A1C MONITORING (POCT)    Date Value   02/15/2017 7.7 % (H)     Hypertension, well controlled.  Tolerating medications.  Patient states he is getting systolics in the 120s at home.  Labs today look good.    Hyperlipidemia, currently taking Lipitor 40 mg  daily.  Seems to be tolerating as well.  Last Lipids:  Chol: 2/15/2017 126  T/15/2017 64  HDL: 2/15/2017 40   LDL: 2/15/2017 73     neuropathy, has found his neuropathy symptoms have improved with his current medication regimen.  Does not have a significant amount of pain issues.    Vitamin D deficiency, last vitamin D level was still a bit low, advised to increase his dose up to 4 or 5000 international units daily.    Mr. Fosss Body mass index is 28.3 kg/(m^2). This is out of the normal range for a 62 y.o. Normal range for ages 18-64 is between 18.5 and 24.9; normal range for ages 65+ is 23-30. To lose weight we reviewed risks and benefits of appropriate options such as diet, exercise, and medications. Patient's strategy will be  self-directed nutrition plan and self-directed exercise program   BP Readings from Last 1 Encounters:02/15/17 : 138/88  Mr. Parks blood pressure is out of the normal range for adults. Per JNC-8 guidelines normal adult blood pressure is < 120/80, pre-hypertensive is between 120/80 and 139/89, and hypertension is 140/90 or greater. Risks of hypertension were discussed. Patient's strategy will be weight loss, increased activity and reduced salt intake    Functional Capacity: > 4 METS.   Reports that he can climb a flight of stairs without any chest pain/heaviness or shortness of breath.   Patient reports no current symptoms of fevers, chills, nausea/vomiting.   No cough. No shortness of breath.   No change in bowel/bladder habits. No melena, hematochezia. No Hematuria.   No rashes. No palpitations.  No orthopnea/paroxysmal nocturnal dyspnea   No vision or hearing issues.   No significant mood issues   No bruising.     SHIRLENE:  No flowsheet data found.    PHQ9:  PHQ Depression Screening 10/25/2016 2/15/2017   Date of PHQ exam (doc flow) 10/25/2016 2/15/2017   1. Lack of interest/pleasure 0 - Not at all 0 - Not at all   2. Feeling down/depressed 0 - Not at all 0 - Not at all   PHQ-2 TOTAL  SCORE 0 0   3. Trouble sleeping 0 - Not at all -   4. Decreased energy 0 - Not at all -   5. Appetite change 0 - Not at all -   6. Feelings of failure 0 - Not at all -   7. Trouble concentrating 0 - Not at all -   8. Activity level 0 - Not at all -   9. Hurting yourself 0 - Not at all -   PHQ-9 TOTAL SCORE 0 -   PHQ-9 Severity Level none -   Functional Impairment not difficult at all -        I have personally reviewed the past medical history, past surgical history, medications, allergies, family and social history as listed below, on 2/15/2017.    Patient Active Problem List      Diagnosis Date Noted     Controlled type 2 diabetes mellitus with complication, with long-term current use of insulin () 10/25/2016     Chronic back pain 04/30/2015     h/o Leiomyoma of the skin of right breast 04/29/2015     Strain of flexor muscle of right hip 12/17/2014     Actinic keratosis of right temple 12/17/2014     History of nephrolithiasis - about 2013 06/24/2014     Hyperparathyroidism (HC) 06/24/2014     Fatigue 05/06/2014     H/O hyperparathyroidism 04/10/2014     S/P subtotal parathyroidectomy () 04/10/2014     Hypertension 04/10/2014     Vitamin D deficiency 04/10/2014     Increased PTH level 04/10/2014     Diabetic retinopathy associated with type 2 diabetes mellitus () 11/25/2013     Lung nodule 11/25/2013     HEMATURIA UNSPECIFIED 07/18/2012     HYPERLIPIDEMIA 01/03/2011     PERIPHERAL NEUROPATHY      Past Medical History      Diagnosis   Date     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       atorvastatin (LIPITOR) 40 mg tablet Take 1 tablet by mouth once daily. 90 tablet 3     blood sugar diagnostic (ACCU-CHEK JAMES PLUS TEST STRP)  "strip Dispense item covered by pt ins. E11.65 IDDM type II, uncontrolled - Test 4 times/day. Reason: High A1C 400 Each 3     Blood-Glucose Meter (ACCU-CHEK JAMES PLUS METER) Dispense glucose meter, test strips and lancets covered by the patient insurance. Test 4 times per day. 1 Device 0     CALCIUM CARBONATE/VITAMIN D2 (CALCIUM 500 WITH VITAMIN D ORAL) Take  by mouth once daily. Take two tablets by mouth daily       cholecalciferol (VITAMIN D-3) 2,000 unit capsule 4000 to 5000 IU daily For Vitamin D Deficiency 100 capsule 3     glipiZIDE (GLUCOTROL) 10 mg tablet Take 1 tablet by mouth 2 times daily before meals. 180 tablet 3     glipiZIDE-metFORMIN (METAGLIP) 2.5-250 mg per tablet Take 1 tablet by mouth once daily with a meal. -- With Lunch 90 tablet 3     insulin glargine (LANTUS SOLOSTAR) 100 unit/mL (3 mL) pen Inject 15-20 Units subcutaneous before bedtime. 15 pen 3     Insulin Needles, Disposable, (MARJORIE PEN NEEDLE) 32 gauge x 5/32\" As directed. For administering insulin at home. FOR USE WITH LANTUS INJECTIONS ONCE DAILY. DIAGNOSIS CODE: E11.65 100 Each 3     lancets Test 4 times per day. Dx E11.65 Insulin Dependent 400 Each 4     losartan (COZAAR) 50 mg tablet Take 1 tablet by mouth once daily. 90 tablet 3     metFORMIN (GLUCOPHAGE) 1,000 mg tablet Take 1 tablet by mouth 2 times daily with meals. 180 tablet 3     Allergies     Allergen  Reactions     Lisinopril Cough     Family History       Problem   Relation Age of Onset     Heart Disease  Father       at age 73, CAD, complications of percutaneous revascularization of abdominal aortic aneurysm        Good Health  Mother      83       Alcohol/Drug  Brother      Alcoholism        Family Status     Relation  Status     Father  at age 73     of CAD, complications of percutaneous revascularization of abdominal aortic aneurysm       Mother Alive    1927      Sister Alive    obese      Sister Alive    obese      Sister Alive    obese      Brother " "Alive    born 1964, no contact      Brother      Social History     Social History        Marital status:       Spouse name: N/A     Number of children:  N/A     Years of education:  N/A     Social History Main Topics       Smoking status: Never Smoker     Smokeless tobacco: Never Used     Alcohol use No     Drug use: Not on file     Sexual activity: Not on file     Other Topics  Concern     Not on file      Social History Narrative     Never a smoker.  Walks regularly,      Pertinent ROS was performed and was negative as noted in HPI above.     EXAM:   Vitals:     02/15/17 1250   BP: 138/88   Pulse: 68   Weight: 89.4 kg (197 lb 3.2 oz)   Height: 1.778 m (5' 10\")     BP Readings from Last 3 Encounters:    02/15/17 138/88   10/25/16 138/88   06/01/16 138/88     Wt Readings from Last 3 Encounters:    02/15/17 89.4 kg (197 lb 3.2 oz)   10/25/16 88.7 kg (195 lb 8 oz)   06/01/16 86.6 kg (191 lb)     Estimated body mass index is 28.3 kg/(m^2) as calculated from the following:    Height as of this encounter: 1.778 m (5' 10\").    Weight as of this encounter: 89.4 kg (197 lb 3.2 oz).     EXAM:  Constitutional: Pleasant, alert, appropriate appearance for age. No acute distress  Lymphatic Exam: Non-palpable nodes in neck, clavicular regions  Pulmonary: Lungs are clear to auscultation bilaterally, without wheezes or crackles  Cardiovascular Exam: regular rate and rhythm, no pedal edema  Gastrointestinal Exam: Soft, non-tender, non-distended, positive bowel sounds  Integument: No abnormal rashes, sores, or ulcerations noted  Neurologic Exam: CN 3-12 grossly intact   Musculoskeletal Exam: Gait and station appear grossly normal  Psychiatric Exam: Awake and Alert, Affect and mood appropriate  Speech is fluent, Thought process is normal    INVESTIGATIONS:  Results for orders placed or performed in visit on 02/15/17      CBC W PLT NO DIFF      Result  Value Ref Range    WHITE BLOOD COUNT         7.9 4.5 - 11.0 thou/cu " mm    RED BLOOD COUNT           5.18 4.30 - 5.90 mil/cu mm    HEMOGLOBIN                15.4 13.5 - 17.5 g/dL    HEMATOCRIT                46.1 37.0 - 53.0 %    MCV                       89 80 - 100 fL    MCH                       29.8 26.0 - 34.0 pg    MCHC                      33.5 32.0 - 36.0 g/dL    RDW                       11.7 11.5 - 15.5 %    PLATELET COUNT            185 140 - 440 thou/cu mm    MPV                       9.3 6.5 - 11.0 fL   COMP METABOLIC PANEL      Result  Value Ref Range    SODIUM 138 133 - 143 mmol/L    POTASSIUM 4.5 3.5 - 5.1 mmol/L    CHLORIDE 105 98 - 107 mmol/L    CO2,TOTAL 27 21 - 31 mmol/L    ANION GAP 6 5 - 18                    GLUCOSE 163 (H) 70 - 105 mg/dL    CALCIUM 9.7 8.6 - 10.3 mg/dL    BUN 18 7 - 25 mg/dL    CREATININE 0.97 0.70 - 1.30 mg/dL    BUN/CREAT RATIO           19                    GFR if African American >60 >60 ml/min/1.73m2    GFR if not African American >60 >60 ml/min/1.73m2    ALBUMIN 4.3 3.5 - 5.7 g/dL    PROTEIN,TOTAL 7.5 6.4 - 8.9 g/dL    GLOBULIN                  3.2 2.0 - 3.7 g/dL    A/G RATIO 1.3 1.0 - 2.0                    BILIRUBIN,TOTAL 0.5 0.3 - 1.0 mg/dL    ALK PHOSPHATASE 69 34 - 104 IU/L    ALT (SGPT) 18 7 - 52 IU/L    AST (SGOT) 17 13 - 39 IU/L   HEMOGLOBIN A1C MONITORING (POCT)      Result  Value Ref Range    HEMOGLOBIN A1C MONITORING (POCT) 7.7 (H) 4.0 - 6.2 %    ESTIMATED AVERAGE GLUCOSE  174 mg/dL   LIPID PANEL      Result  Value Ref Range    CHOLESTEROL,TOTAL 126 <200 mg/dL    TRIGLYCERIDES 64 <150 mg/dL    HDL CHOLESTEROL 40 23 - 92 mg/dL    NON-HDL CHOLESTEROL 86 <145 mg/dl    CHOL/HDL RATIO            3.15 <4.50                    LDL CHOLESTEROL 73 <100 mg/dL    PATIENT STATUS            FASTING                   PSA TOTAL (DIAGNOSTIC)      Result  Value Ref Range    PSA TOTAL (DIAGNOSTIC) 1.200 <=3.100 ng/mL   URINALYSIS W REFLEX MICROSCOPIC IF POSITIVE      Result  Value Ref Range    COLOR                     Yellow Yellow Color     CLARITY                   Clear Clear Clarity    SPECIFIC GRAVITY,URINE    1.010 1.010, 1.015, 1.020, 1.025                    PH,URINE                  5.5 6.0, 7.0, 8.0, 5.5, 6.5, 7.5, 8.5                    UROBILINOGEN,QUALITATIVE  Normal Normal EU/dl    PROTEIN, URINE Negative Negative mg/dL    GLUCOSE, URINE Negative Negative mg/dL    KETONES,URINE             Negative Negative mg/dL    BILIRUBIN,URINE           Negative Negative                    OCCULT BLOOD,URINE        Trace (A) Negative                    NITRITE                   Negative Negative                    LEUKOCYTE ESTERASE        Negative Negative                   URINALYSIS MICROSCOPIC      Result  Value Ref Range    RBC None Seen 0-2, None Seen /HPF    WBC None Seen 0-2, 3-5, None Seen /HPF    BACTERIA                  None Seen None Seen, Rare, Occasional, Few Bacteria/HPF    EPITHELIAL CELLS          None Seen None Seen, Few Epi/HPF       ASSESSMENT AND PLAN:  Oh was seen today for medication management.    Diagnoses and all orders for this visit:    Controlled type 2 diabetes mellitus with complication, with long-term current use of insulin (HC)  -     glipiZIDE (GLUCOTROL) 10 mg tablet; Take 1 tablet by mouth 2 times daily before meals.  -     insulin glargine (LANTUS SOLOSTAR) 100 unit/mL (3 mL) pen; Inject 15-20 Units subcutaneous before bedtime.  -     metFORMIN (GLUCOPHAGE) 1,000 mg tablet; Take 1 tablet by mouth 2 times daily with meals.  -     glipiZIDE-metFORMIN (METAGLIP) 2.5-250 mg per tablet; Take 1 tablet by mouth once daily with a meal. -- With Lunch    Hypertension  -     losartan (COZAAR) 50 mg tablet; Take 1 tablet by mouth once daily.    Hyperlipidemia, unspecified hyperlipidemia type  -     atorvastatin (LIPITOR) 40 mg tablet; Take 1 tablet by mouth once daily.    Diabetic retinopathy associated with type 2 diabetes mellitus, macular edema presence unspecified, unspecified retinopathy severity (HC)    PERIPHERAL  NEUROPATHY    Vitamin D deficiency    lab results and schedule of future lab studies reviewed with patient, reviewed diet, exercise and weight control, recommended sodium restriction, cardiovascular risk and specific lipid/LDL goals reviewed, specific diabetic recommendations low cholesterol diet, weight control and daily exercise discussed, home glucose monitoring emphasized, foot care discussed and Podiatry visits discussed, annual eye examinations at Ophthalmology discussed, glycohemoglobin and other lab monitoring discussed and long term diabetic complications discussed, use of aspirin to prevent MI and TIA's discussed    -- Expected clinical course discussed   -- Medications and their side effects discussed    The ASCVD Risk score ([place] PHILLIP Jr., et al., 2013) failed to calculate for the following reasons:    The valid total cholesterol range is 130 to 320 mg/dL    Oh is also recommended to eat a heart-healthy diet, do regular aerobic exercises, maintain a desirable body weight, and avoid tobacco products. These recommendations are from the American Heart Association (AHA) which stresses the importance of lifestyle changes to lower cardiovascular disease risk.     Return in about 3 months (around 5/15/2017).    Patient Instructions     Due to high evening blood sugars before supper, start glipizide 2.5 mg-250 mg metformin tablet once daily with lunch.    Labs today show slight bump in your hemoglobin A1c, likely due to running out of glipizide.      VITAMIN D TOTAL      Date Value Ref Range Status   04/16/2015 31.4 30.0 - 80.0 ng/mL Final   12/08/2014 24.9 (L) 30.0 - 80.0 ng/mL Final   04/10/2014 18.8 (L) 30.0 - 80.0 ng/mL Final   11/25/2013 15.5 (L) 30.0 - 80.0 ng/mL Final     VITAMIN D TOTAL AFL      Date Value Ref Range Status   06/01/2016 28.4   ng/mL Final   12/04/2015 25.0   ng/mL Final          Vitamin D3 is a safe and effective product for the treatment and prevention of osteoporosis, rickets and  vitamin D deficiency. Most people living in this part of the northern hemisphere are vitamin D deficient.     The reported Vitamin D lab value can vary by up to +/- 10 points from the reported value --- meaning your Vit D value could be from 18 to 38.     Goal is to have the lab value resulted below be between 40 and 50.     Vitamin D was VERY LOW   -- Start taking 8199-0892 IU vitamin D3 daily with food as it is a fat soluble vitamin.   -- We can recheck this level in about 3 to 4 months    Vitamin D3 is associated with improved immunity, improved bone health, improved mood and in some people a reduction in pain severity.       Blood pressure checks at home - check some in AM, some in Afternoon, some in Evening and record   -- bring these with you to your next appointment.     Goal blood pressures -- less than 140 and less than 90.     -- If running higher than this on regular basis, will need to adjust your medications.   -- Ideally would like the numbers about 120-130 and 70-80.       Vitamin B12 deficiency:    Vitamin B12 deficiency can cause numerous symptoms.  Fatigue and malaise, low energy levels, low blood counts or anemia, poor mood or depression, neuropathy or pins and needles/burning sensation of the hands and feet.    -- trial of B12 shot today.    -- If B12 shot improves your energy and your blood counts, we can do B12 shots every 3-4 weeks up to every 2 or 3 months if needed.    -- metformin can lower B12 levels.     -- Consider B12 tablet or B-complex tablet -- once daily.     -- Some people are able to take and absorb oral B12 or B complex tablets.   -- Some people are NOT able to absorb oral B12 very well.    If you decide to proceed with oral B12 replacement, but your B12 levels do not improve, you likely will need to use B12 shots instead.     Return for Diabetes labs and clinic follow-up appointment every 3 to 4 months.  --- (Go for about 91 to 100 days)    Schedule lab only appointment --- A  few days AFTER: 05/16/17     Schedule clinic appointment with Dr. Wilson -- Same day as labs, or 1-2 days later.     Insurance companies are now grading you and I on your blood sugar control -- Goal is to get your A1c down to 7.9% or lower and NO Smoking!    -- Medicare and most insurance companies, will only cover Hemoglobin A1c labs to be rechecked every 91+ days.      HEMOGLOBIN A1C MONITORING (POCT)    Date Value   02/15/2017 7.7 % (H)   04/10/2013 6.5 % NGSP (H)     HEMOGLOBIN A1C SCREENING (% Total Hgb)    Date Value   04/10/2014 6.9 (H)     HEMOGLOBIN A1C GIH (%)    Date Value   07/18/2012 6.7 (H)        Next follow-up appointment with Dr. Wilson should be scheduled:  -- Approximately a few days AFTER: 05/16/17      Moshe Wilson MD

## 2018-01-03 NOTE — PATIENT INSTRUCTIONS
Patient Information     Patient Name MRN Oh Arechiga [id number] Male 1955      Patient Instructions by Moshe Wilson MD at 2/15/2017 12:50 PM     Author:  Moshe Wilson MD  Service:  (none) Author Type:  Physician     Filed:  2/15/2017  1:15 PM  Encounter Date:  2/15/2017 Status:  Addendum     :  Moshe Wilson MD (Physician)        Related Notes: Original Note by Moshe Wilson MD (Physician) filed at 2/15/2017  1:14 PM            Due to high evening blood sugars before supper, start glipizide 2.5 mg-250 mg metformin tablet once daily with lunch.    Labs today show slight bump in your hemoglobin A1c, likely due to running out of glipizide.      VITAMIN D TOTAL      Date Value Ref Range Status   2015 31.4 30.0 - 80.0 ng/mL Final   2014 24.9 (L) 30.0 - 80.0 ng/mL Final   04/10/2014 18.8 (L) 30.0 - 80.0 ng/mL Final   2013 15.5 (L) 30.0 - 80.0 ng/mL Final     VITAMIN D TOTAL AFL      Date Value Ref Range Status   2016 28.4   ng/mL Final   2015 25.0   ng/mL Final          Vitamin D3 is a safe and effective product for the treatment and prevention of osteoporosis, rickets and vitamin D deficiency. Most people living in this part of the northern hemisphere are vitamin D deficient.     The reported Vitamin D lab value can vary by up to +/- 10 points from the reported value --- meaning your Vit D value could be from 18 to 38.     Goal is to have the lab value resulted below be between 40 and 50.     Vitamin D was VERY LOW   -- Start taking 5894-4316 IU vitamin D3 daily with food as it is a fat soluble vitamin.   -- We can recheck this level in about 3 to 4 months    Vitamin D3 is associated with improved immunity, improved bone health, improved mood and in some people a reduction in pain severity.       Blood pressure checks at home - check some in AM, some in Afternoon, some in Evening and record   -- bring these with you to your next appointment.      Goal blood pressures -- less than 140 and less than 90.     -- If running higher than this on regular basis, will need to adjust your medications.   -- Ideally would like the numbers about 120-130 and 70-80.       Vitamin B12 deficiency:    Vitamin B12 deficiency can cause numerous symptoms.  Fatigue and malaise, low energy levels, low blood counts or anemia, poor mood or depression, neuropathy or pins and needles/burning sensation of the hands and feet.    -- trial of B12 shot today.    -- If B12 shot improves your energy and your blood counts, we can do B12 shots every 3-4 weeks up to every 2 or 3 months if needed.    -- metformin can lower B12 levels.     -- Consider B12 tablet or B-complex tablet -- once daily.     -- Some people are able to take and absorb oral B12 or B complex tablets.   -- Some people are NOT able to absorb oral B12 very well.    If you decide to proceed with oral B12 replacement, but your B12 levels do not improve, you likely will need to use B12 shots instead.     Return for Diabetes labs and clinic follow-up appointment every 3 to 4 months.  --- (Go for about 91 to 100 days)    Schedule lab only appointment --- A few days AFTER: 05/16/17     Schedule clinic appointment with Dr. Wilson -- Same day as labs, or 1-2 days later.     Insurance companies are now grading you and I on your blood sugar control -- Goal is to get your A1c down to 7.9% or lower and NO Smoking!    -- Medicare and most insurance companies, will only cover Hemoglobin A1c labs to be rechecked every 91+ days.      HEMOGLOBIN A1C MONITORING (POCT)    Date Value   02/15/2017 7.7 % (H)   04/10/2013 6.5 % NGSP (H)     HEMOGLOBIN A1C SCREENING (% Total Hgb)    Date Value   04/10/2014 6.9 (H)     HEMOGLOBIN A1C GIH (%)    Date Value   07/18/2012 6.7 (H)        Next follow-up appointment with Dr. Wilson should be scheduled:  -- Approximately a few days AFTER: 05/16/17

## 2018-01-03 NOTE — TELEPHONE ENCOUNTER
Patient Information     Patient Name MRN Sex Oh Mcneal [id number] Male 1955      Telephone Encounter by Deann Terry RN at 3/10/2017  4:13 PM     Author:  Deann Terry RN Service:  (none) Author Type:  (none)     Filed:  3/10/2017  4:14 PM Encounter Date:  3/10/2017 Status:  Signed     :  Deann Terry RN (NURS- Registered Nurse)            Diabetic Supplies    Office visit in the past 12 months.    Last visit with ELLIE ARIAS was on: 02/15/2017 in [facility]  Next visit with ELLIE ARIAS is on: 2017 in [facility]  Next visit with Internal Medicine is on: 2017 in [facility]    Max refill for 12 months from last office visit.  Always add ICD-9 code.    Needs not be listed on Med List to fill.  Need new RX every 12 months or if exceeds the limitations set by Medicare, then every 6 months.  Also when testing frequency is changed is there a need to obtain a new order.  A refill request does not need to be approved by the ordering physician-a beneficiary or their caregiver may request refills.  Physicians are not required to fill out additional forms such as home testing results for suppliers or provide additional documentation unless the supplier is audited and the  is requesting such documentation.      Prescription refilled per RN Medication Refill Policy.................... Denan Terry ....................  3/10/2017   4:13 PM

## 2018-01-03 NOTE — TELEPHONE ENCOUNTER
Patient Information     Patient Name MRN Sex Oh Mcneal [id number] Male 1955      Telephone Encounter by Yoko Antoine RN at 2017  3:13 PM     Author:  Yoko Antoine RN Service:  (none) Author Type:  NURS- Registered Nurse     Filed:  2017  3:14 PM Encounter Date:  2017 Status:  Signed     :  Yoko Antoine RN (NURS- Registered Nurse)            Angiotensin Receptor Blockers (ARB)    Office visit in the past 12 months or per provider note.    Last visit with ELLIE ARIAS was on: 10/25/2016 in [facility]  Next visit with ELLIE ARIAS is on: No future appointment listed with this provider  Next visit with Internal Medicine is on: No future appointment listed in this department    Lab test requirements:  Creatinine and Potassium annually, if ordering lab, order BMP.  CREATININE (mg/dL)    Date Value   10/18/2016 0.89     POTASSIUM (mmol/L)    Date Value   10/18/2016 4.6       Max refill for 12 months from last office visit or per provider note    Prescription refilled per RN Medication Refill Policy.................... YOKO ANTOINE RN ....................  2017   3:13 PM

## 2018-01-05 NOTE — PROGRESS NOTES
Patient Information     Patient Name MRN Sex Oh Mcneal [id number] Male 1955      Progress Notes by Moshe Wilson MD at 2017  8:00 AM     Author:  Moshe Wilson MD Service:  (none) Author Type:  Physician     Filed:  2017 10:03 AM Encounter Date:  2017 Status:  Signed     :  Moshe Wilson MD (Physician)            Nursing Notes:   Stephenie Varghese  2017  8:24 AM  Signed  Patient presents to the clinic for follow up with labs.  Last eye exam was over a year ago but patient reports that he intends on making one this summer.    Stephenie Varghese LPN        2017 8:00 AM    Oh Delarosa presents to clinic today for:   Chief Complaint     Patient presents with       Follow Up      labs     HPI: Mr. Delarosa is a 62 y.o. male who presents today for evaluation of above.     (E11.8,  Z79.4) Controlled type 2 diabetes mellitus with complication, with long-term current use of insulin (HC)  (primary encounter diagnosis)  (E78.2) Mixed hyperlipidemia  (E11.319) Diabetic retinopathy of left eye associated with type 2 diabetes mellitus, macular edema presence unspecified, unspecified retinopathy severity  (E55.9) Vitamin D deficiency     Diabetes - checking glucose 3-4x daily. Spikes up after supper. Going to try walking more.  Has not been taking his combination glipizide/metformin very often.  States that he rarely eats a large lunch and he does need to get his blood sugars under better control.  Advised that he start taking his glipizide/metformin with supper every day.  His highest numbers are typically in the evening anyway.  Discussed need for possible dose increase of Lantus up to 18 or 20 units daily as well.  He is going to monitor blood sugars and adjust as needed.    Checks his blood sugar 3-4 times daily.  Morning blood sugars tend to run around 100.  Rarely up to 120.  Was 105 this morning.  - Last 3 months he's had 2 or 3 blood sugars in the evening up into the 200  range.  Maybe 4 times into the 160 range.  Has not checked his blood sugars after eating      Hyperlipidemia, continues to take Lipitor 40 mg daily  Last Lipids:  Chol: 2/15/2017 126  T/15/2017 64  HDL: 2/15/2017 40   LDL: 2/15/2017 73    Diabetic retinopathy, due for eye exam.  He is going to get this scheduled sometime soon.  States his left eye is legally blind.    Vitamin D deficiency, check D level with next lab check.  He continues on oral vitamin D replacement.      Patient has had chronic disability for many years.  He needs updated forms completed today.  These were initially filled out by Dr. Cobian many years ago.    Mr. Fosss Body mass index is 27.73 kg/(m^2). This is out of the normal range for a 62 y.o. Normal range for ages 18+ is between 18.5 and 24.9. To lose weight we reviewed risks and benefits of appropriate options such as diet, exercise, and medications. Patient's strategy will be  self-directed nutrition plan and self-directed exercise program   BP Readings from Last 1 Encounters:17 : 134/88  Mr. Parks blood pressure is out of the normal range for adults. Per JNC-8 guidelines normal adult blood pressure is < 120/80, pre-hypertensive is between 120/80 and 139/89, and hypertension is 140/90 or greater. Risks of hypertension were discussed. Patient's strategy will be weight loss, increased activity and reduced salt intake    Functional Capacity: > 4 METS.   Reports that he can climb a flight of stairs without any chest pain/heaviness or shortness of breath.   Patient reports no current symptoms of fevers, chills, nausea/vomiting.   No cough. No shortness of breath.   No change in bowel/bladder habits. No melena, hematochezia. No Hematuria.   No rashes. No palpitations.  No orthopnea/paroxysmal nocturnal dyspnea   No vision or hearing issues.   No significant mood issues   No bruising.     SHIRLENE:  No flowsheet data found.    PHQ9:  PHQ Depression Screening 2/15/2017 2017   Date  of PHQ exam (doc flow) 2/15/2017 5/24/2017   1. Lack of interest/pleasure 0 - Not at all 0 - Not at all   2. Feeling down/depressed 0 - Not at all 0 - Not at all   PHQ-2 TOTAL SCORE 0 0   3. Trouble sleeping - 0 - Not at all   4. Decreased energy - 0 - Not at all   5. Appetite change - 0 - Not at all   6. Feelings of failure - 0 - Not at all   7. Trouble concentrating - 0 - Not at all   8. Activity level - 0 - Not at all   9. Hurting yourself - 0 - Not at all   PHQ-9 TOTAL SCORE - 0   PHQ-9 Severity Level - none   Functional Impairment - not difficult at all        I have personally reviewed the past medical history, past surgical history, medications, allergies, family and social history as listed below, on 5/24/2017.    Patient Active Problem List      Diagnosis Date Noted     Controlled type 2 diabetes mellitus with complication, with long-term current use of insulin (HC) 10/25/2016     Chronic back pain 04/30/2015     h/o Leiomyoma of the skin of right breast 04/29/2015     Strain of flexor muscle of right hip 12/17/2014     Actinic keratosis of right temple 12/17/2014     History of nephrolithiasis - about 2013 06/24/2014     Hyperparathyroidism (HC) 06/24/2014     Fatigue 05/06/2014     H/O hyperparathyroidism 04/10/2014     S/P subtotal parathyroidectomy (HC) 04/10/2014     Hypertension 04/10/2014     Vitamin D deficiency 04/10/2014     Increased PTH level 04/10/2014     Diabetic retinopathy associated with type 2 diabetes mellitus (HC) 11/25/2013     Lung nodule 11/25/2013     HEMATURIA UNSPECIFIED 07/18/2012     HYPERLIPIDEMIA 01/03/2011     PERIPHERAL NEUROPATHY      Past Medical History:     Diagnosis  Date     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       atorvastatin (LIPITOR) 40 mg tablet Take 1 tablet by mouth once daily. 90 tablet 3     blood sugar diagnostic (ACCU-CHEK JAMES PLUS TEST STRP) strip Dispense item covered by pt ins. E11.65 IDDM type II, uncontrolled - Test 4 times/day. Reason: High A1C 400 Each 3     Blood-Glucose Meter (ACCU-CHEK JAMES PLUS METER) Dispense glucose meter, test strips and lancets covered by the patient insurance. Test 4 times per day. 1 Device 0     CALCIUM CARBONATE/VITAMIN D2 (CALCIUM 500 WITH VITAMIN D ORAL) Take  by mouth once daily. Take two tablets by mouth daily       cholecalciferol (VITAMIN D-3) 2,000 unit capsule 4000 to 5000 IU daily For Vitamin D Deficiency 100 capsule 3     glipiZIDE (GLUCOTROL) 10 mg tablet Take 1 tablet by mouth 2 times daily before meals. 180 tablet 3     glipiZIDE-metFORMIN (METAGLIP) 2.5-250 mg per tablet Take 1 tablet by mouth once daily with a meal. -- With Supper Every Evening 90 tablet 3     insulin glargine (LANTUS SOLOSTAR) 100 unit/mL (3 mL) pen Inject 15-25 Units subcutaneous before bedtime. 20 pen 3     lancets Test 4 times per day. Dx E11.65 Insulin Dependent 400 Each 4     losartan (COZAAR) 50 mg tablet Take 1 tablet by mouth once daily. 90 tablet 3     metFORMIN (GLUCOPHAGE) 1,000 mg tablet Take 1 tablet by mouth 2 times daily with meals. 180 tablet 3     MARJORIE PEN NEEDLE 32 gauge x 5/32\" AS DIRECTED FOR ADMINISTERING INSULIN AT HOME. FOR USE WITH LANTUS INJECTIONS ONCE DAILY. 100 Each 4     Allergies     Allergen  Reactions     Lisinopril Cough     Family History       Problem   Relation Age of Onset     Heart Disease  Father       at age 73, CAD, complications of percutaneous revascularization of abdominal aortic aneurysm        Good Health  Mother      83       Alcohol/Drug  Brother      Alcoholism        Family Status     Relation  Status     Father  at age 73     of CAD, complications of percutaneous revascularization of abdominal aortic " "aneurysm       Mother Alive    1927      Sister Alive    obese      Sister Alive    obese      Sister Alive    obese      Brother Alive    born 1964, no contact      Brother      Social History     Social History        Marital status:       Spouse name: N/A     Number of children:  N/A     Years of education:  N/A     Social History Main Topics       Smoking status: Never Smoker     Smokeless tobacco: Never Used     Alcohol use No     Drug use: Not on file     Sexual activity: Not on file     Other Topics  Concern     Not on file      Social History Narrative     Never a smoker.  Walks regularly,      Pertinent ROS was performed and was negative as noted in HPI above.     EXAM:   Vitals:     05/24/17 0802   BP: 134/88   Pulse: 100   Temp: 97.8  F (36.6  C)   TempSrc: Tympanic   Weight: 87.7 kg (193 lb 4 oz)   Height: 1.778 m (5' 10\")     BP Readings from Last 3 Encounters:    05/24/17 134/88   02/15/17 138/88   10/25/16 138/88     Wt Readings from Last 3 Encounters:    05/24/17 87.7 kg (193 lb 4 oz)   02/15/17 89.4 kg (197 lb 3.2 oz)   10/25/16 88.7 kg (195 lb 8 oz)     Estimated body mass index is 27.73 kg/(m^2) as calculated from the following:    Height as of this encounter: 1.778 m (5' 10\").    Weight as of this encounter: 87.7 kg (193 lb 4 oz).     EXAM:  Constitutional: Pleasant, alert, appropriate appearance for age. No acute distress  ENT: Normocephalic, Atraumatic, Thyroid without nodules or tenderness   Nose/Mouth: Oral pharynx without erythema or exudates, Nose is patent bilaterally, no rhinorrhea and Dental hygeine adequate   Eyes:  Extraocular muscles intact, Sclera non-icteric, Conjunctiva without erythema  Lymphatic Exam: Non-palpable nodes in neck, clavicular regions  Pulmonary: Lungs are clear to auscultation bilaterally, without wheezes or crackles  Cardiovascular Exam: regular rate and rhythm, no pedal edema, no murmur, click, rub or gallop appreciated  Gastrointestinal Exam: Soft, " non-tender, non-distended, positive bowel sounds   Integument: No abnormal rashes, sores, or ulcerations noted  Neurologic Exam: CN 3-12 grossly intact   Musculoskeletal Exam: Moves upper and lower extremities symmetrically, No focal weakness  Gait and station appear grossly normal  Psychiatric Exam: Awake and Alert, Affect and mood appropriate  Speech is fluent, Thought process is normal    INVESTIGATIONS:  Results for orders placed or performed in visit on 05/22/17      CBC W PLT NO DIFF      Result  Value Ref Range    WHITE BLOOD COUNT         9.6 4.5 - 11.0 thou/cu mm    RED BLOOD COUNT           5.08 4.30 - 5.90 mil/cu mm    HEMOGLOBIN                15.7 13.5 - 17.5 g/dL    HEMATOCRIT                44.2 37.0 - 53.0 %    MCV                       87 80 - 100 fL    MCH                       30.9 26.0 - 34.0 pg    MCHC                      35.5 32.0 - 36.0 g/dL    RDW                       13.1 11.5 - 15.5 %    PLATELET COUNT            234 140 - 440 thou/cu mm    MPV                       10.4 6.5 - 11.0 fL   COMP METABOLIC PANEL      Result  Value Ref Range    SODIUM 139 133 - 143 mmol/L    POTASSIUM 4.2 3.5 - 5.1 mmol/L    CHLORIDE 102 98 - 107 mmol/L    CO2,TOTAL 27 21 - 31 mmol/L    ANION GAP 10 5 - 18                    GLUCOSE 92 70 - 105 mg/dL    CALCIUM 9.5 8.6 - 10.3 mg/dL    BUN 18 7 - 25 mg/dL    CREATININE 0.99 0.70 - 1.30 mg/dL    BUN/CREAT RATIO           18                    GFR if African American >60 >60 ml/min/1.73m2    GFR if not African American >60 >60 ml/min/1.73m2    ALBUMIN 4.4 3.5 - 5.7 g/dL    PROTEIN,TOTAL 7.2 6.4 - 8.9 g/dL    GLOBULIN                  2.8 2.0 - 3.7 g/dL    A/G RATIO 1.6 1.0 - 2.0                    BILIRUBIN,TOTAL 0.7 0.3 - 1.0 mg/dL    ALK PHOSPHATASE 75 34 - 104 IU/L    ALT (SGPT) 17 7 - 52 IU/L    AST (SGOT) 18 13 - 39 IU/L   HEMOGLOBIN A1C MONITORING (POCT)      Result  Value Ref Range    HEMOGLOBIN A1C MONITORING (POCT) 7.8 (H) 4.0 - 6.2 %    ESTIMATED AVERAGE  GLUCOSE  177 mg/dL       ASSESSMENT AND PLAN:  Oh was seen today for follow up.    Diagnoses and all orders for this visit:    Controlled type 2 diabetes mellitus with complication, with long-term current use of insulin (HC)  -     Discontinue: insulin glargine (LANTUS SOLOSTAR) 100 unit/mL (3 mL) pen; Inject 18-25 Units subcutaneous before bedtime.    Mixed hyperlipidemia    Diabetic retinopathy of left eye associated with type 2 diabetes mellitus, macular edema presence unspecified, unspecified retinopathy severity    Vitamin D deficiency  -     VITAMIN D 25 (DEFICIENCY); Future      lab results and schedule of future lab studies reviewed with patient, reviewed diet, exercise and weight control, recommended sodium restriction, cardiovascular risk and specific lipid/LDL goals reviewed, specific diabetic recommendations low cholesterol diet, weight control and daily exercise discussed, home glucose monitoring emphasized, foot care discussed and Podiatry visits discussed, annual eye examinations at Ophthalmology discussed, glycohemoglobin and other lab monitoring discussed and long term diabetic complications discussed, use of aspirin to prevent MI and TIA's discussed    -- Expected clinical course discussed   -- Medications and their side effects discussed    25 minutes spent in face-to-face interaction with patient with greater than 50% spent in counseling and care coordination of listed medical problems.      The ASCVD Risk score ([place] PHILLIP Jr, et al., 2013) failed to calculate for the following reasons:    The valid total cholesterol range is 130 to 320 mg/dL    Oh is also recommended to eat a heart-healthy diet, do regular aerobic exercises, maintain a desirable body weight, and avoid tobacco products. These recommendations are from the American Heart Association (AHA) which stresses the importance of lifestyle changes to lower cardiovascular disease risk.    Return in about 3 months (around  8/24/2017).    Patient Instructions     A1c is up again slightly.     Continue Lantus at 15 units daily.     Take your Glipizide-metformin combo pill with supper every evening (Not lunch anymore).     Results for orders placed or performed in visit on 05/22/17      CBC W PLT NO DIFF      Result  Value Ref Range    WHITE BLOOD COUNT         9.6 4.5 - 11.0 thou/cu mm    RED BLOOD COUNT           5.08 4.30 - 5.90 mil/cu mm    HEMOGLOBIN                15.7 13.5 - 17.5 g/dL    HEMATOCRIT                44.2 37.0 - 53.0 %    MCV                       87 80 - 100 fL    MCH                       30.9 26.0 - 34.0 pg    MCHC                      35.5 32.0 - 36.0 g/dL    RDW                       13.1 11.5 - 15.5 %    PLATELET COUNT            234 140 - 440 thou/cu mm    MPV                       10.4 6.5 - 11.0 fL   COMP METABOLIC PANEL      Result  Value Ref Range    SODIUM 139 133 - 143 mmol/L    POTASSIUM 4.2 3.5 - 5.1 mmol/L    CHLORIDE 102 98 - 107 mmol/L    CO2,TOTAL 27 21 - 31 mmol/L    ANION GAP 10 5 - 18                    GLUCOSE 92 70 - 105 mg/dL    CALCIUM 9.5 8.6 - 10.3 mg/dL    BUN 18 7 - 25 mg/dL    CREATININE 0.99 0.70 - 1.30 mg/dL    BUN/CREAT RATIO           18                    GFR if African American >60 >60 ml/min/1.73m2    GFR if not African American >60 >60 ml/min/1.73m2    ALBUMIN 4.4 3.5 - 5.7 g/dL    PROTEIN,TOTAL 7.2 6.4 - 8.9 g/dL    GLOBULIN                  2.8 2.0 - 3.7 g/dL    A/G RATIO 1.6 1.0 - 2.0                    BILIRUBIN,TOTAL 0.7 0.3 - 1.0 mg/dL    ALK PHOSPHATASE 75 34 - 104 IU/L    ALT (SGPT) 17 7 - 52 IU/L    AST (SGOT) 18 13 - 39 IU/L   HEMOGLOBIN A1C MONITORING (POCT)      Result  Value Ref Range    HEMOGLOBIN A1C MONITORING (POCT) 7.8 (H) 4.0 - 6.2 %    ESTIMATED AVERAGE GLUCOSE  177 mg/dL          Get out and walk for 10 to 15 minutes after each meal -- this significantly lowers the spike in blood sugar after eating.     Return for Diabetes labs and clinic follow-up  appointment every 3 to 4 months.  --- (Go for about 91 to 100 days)    Schedule lab only appointment --- A few days AFTER: 08/22/17     Schedule clinic appointment with Dr. Wilson -- Same day as labs, or 1-2 days later.     Insurance companies are now grading you and I on your blood sugar control -- Goal is to get your A1c down to 7.9% or lower and NO Smoking!    -- Medicare and most insurance companies, will only cover Hemoglobin A1c labs to be rechecked every 91+ days.      HEMOGLOBIN A1C MONITORING (POCT)    Date Value   05/22/2017 7.8 % (H)   04/10/2013 6.5 % NGSP (H)     HEMOGLOBIN A1C SCREENING (% Total Hgb)    Date Value   04/10/2014 6.9 (H)        Next follow-up appointment with Dr. Wilson should be scheduled:  -- Approximately a few days AFTER: 08/22/17      Moshe Wilson MD

## 2018-01-05 NOTE — PATIENT INSTRUCTIONS
Patient Information     Patient Name MRN Sex Oh Mcneal [id number] Male 1955      Patient Instructions by Moshe Wilson MD at 2017  8:00 AM     Author:  Moshe Wilson MD  Service:  (none) Author Type:  Physician     Filed:  2017  8:35 AM  Encounter Date:  2017 Status:  Addendum     :  Moshe Wilson MD (Physician)        Related Notes: Original Note by Moshe Wilson MD (Physician) filed at 2017  8:31 AM            A1c is up again slightly.     Continue Lantus at 15 units daily.     Take your Glipizide-metformin combo pill with supper every evening (Not lunch anymore).     Results for orders placed or performed in visit on 17      CBC W PLT NO DIFF      Result  Value Ref Range    WHITE BLOOD COUNT         9.6 4.5 - 11.0 thou/cu mm    RED BLOOD COUNT           5.08 4.30 - 5.90 mil/cu mm    HEMOGLOBIN                15.7 13.5 - 17.5 g/dL    HEMATOCRIT                44.2 37.0 - 53.0 %    MCV                       87 80 - 100 fL    MCH                       30.9 26.0 - 34.0 pg    MCHC                      35.5 32.0 - 36.0 g/dL    RDW                       13.1 11.5 - 15.5 %    PLATELET COUNT            234 140 - 440 thou/cu mm    MPV                       10.4 6.5 - 11.0 fL   COMP METABOLIC PANEL      Result  Value Ref Range    SODIUM 139 133 - 143 mmol/L    POTASSIUM 4.2 3.5 - 5.1 mmol/L    CHLORIDE 102 98 - 107 mmol/L    CO2,TOTAL 27 21 - 31 mmol/L    ANION GAP 10 5 - 18                    GLUCOSE 92 70 - 105 mg/dL    CALCIUM 9.5 8.6 - 10.3 mg/dL    BUN 18 7 - 25 mg/dL    CREATININE 0.99 0.70 - 1.30 mg/dL    BUN/CREAT RATIO           18                    GFR if African American >60 >60 ml/min/1.73m2    GFR if not African American >60 >60 ml/min/1.73m2    ALBUMIN 4.4 3.5 - 5.7 g/dL    PROTEIN,TOTAL 7.2 6.4 - 8.9 g/dL    GLOBULIN                  2.8 2.0 - 3.7 g/dL    A/G RATIO 1.6 1.0 - 2.0                    BILIRUBIN,TOTAL 0.7 0.3 - 1.0 mg/dL    ALK  PHOSPHATASE 75 34 - 104 IU/L    ALT (SGPT) 17 7 - 52 IU/L    AST (SGOT) 18 13 - 39 IU/L   HEMOGLOBIN A1C MONITORING (POCT)      Result  Value Ref Range    HEMOGLOBIN A1C MONITORING (POCT) 7.8 (H) 4.0 - 6.2 %    ESTIMATED AVERAGE GLUCOSE  177 mg/dL          Get out and walk for 10 to 15 minutes after each meal -- this significantly lowers the spike in blood sugar after eating.     Return for Diabetes labs and clinic follow-up appointment every 3 to 4 months.  --- (Go for about 91 to 100 days)    Schedule lab only appointment --- A few days AFTER: 08/22/17     Schedule clinic appointment with Dr. Wilson -- Same day as labs, or 1-2 days later.     Insurance companies are now grading you and I on your blood sugar control -- Goal is to get your A1c down to 7.9% or lower and NO Smoking!    -- Medicare and most insurance companies, will only cover Hemoglobin A1c labs to be rechecked every 91+ days.      HEMOGLOBIN A1C MONITORING (POCT)    Date Value   05/22/2017 7.8 % (H)   04/10/2013 6.5 % NGSP (H)     HEMOGLOBIN A1C SCREENING (% Total Hgb)    Date Value   04/10/2014 6.9 (H)        Next follow-up appointment with Dr. Wilson should be scheduled:  -- Approximately a few days AFTER: 08/22/17

## 2018-01-05 NOTE — NURSING NOTE
Patient Information     Patient Name MRN Sex Oh Mcneal [id number] Male 1955      Nursing Note by Stephenie Varghese at 2017  8:00 AM     Author:  Stephenie Varghese Service:  (none) Author Type:  (none)     Filed:  2017  8:24 AM Encounter Date:  2017 Status:  Signed     :  Stephenie Varghese            Patient presents to the clinic for follow up with labs.  Last eye exam was over a year ago but patient reports that he intends on making one this summer.    Stephenie Varghese LPN        2017 8:00 AM

## 2018-01-26 VITALS
HEART RATE: 68 BPM | DIASTOLIC BLOOD PRESSURE: 88 MMHG | SYSTOLIC BLOOD PRESSURE: 134 MMHG | BODY MASS INDEX: 27.67 KG/M2 | HEART RATE: 100 BPM | DIASTOLIC BLOOD PRESSURE: 88 MMHG | WEIGHT: 197.2 LBS | HEIGHT: 70 IN | BODY MASS INDEX: 28.23 KG/M2 | SYSTOLIC BLOOD PRESSURE: 138 MMHG | WEIGHT: 193.25 LBS | HEIGHT: 70 IN | TEMPERATURE: 97.8 F

## 2018-01-26 VITALS
WEIGHT: 197 LBS | SYSTOLIC BLOOD PRESSURE: 138 MMHG | DIASTOLIC BLOOD PRESSURE: 80 MMHG | HEART RATE: 76 BPM | BODY MASS INDEX: 29.09 KG/M2

## 2018-01-29 ENCOUNTER — DOCUMENTATION ONLY (OUTPATIENT)
Dept: FAMILY MEDICINE | Facility: OTHER | Age: 63
End: 2018-01-29

## 2018-01-29 PROBLEM — G60.9 HEREDITARY AND IDIOPATHIC PERIPHERAL NEUROPATHY: Status: ACTIVE | Noted: 2018-01-29

## 2018-01-29 RX ORDER — ASPIRIN 325 MG
325 TABLET ORAL
COMMUNITY

## 2018-01-29 RX ORDER — LOSARTAN POTASSIUM 50 MG/1
50 TABLET ORAL DAILY
COMMUNITY
Start: 2017-12-07 | End: 2018-10-15

## 2018-01-29 RX ORDER — GLIPIZIDE 10 MG/1
10 TABLET ORAL
COMMUNITY
Start: 2017-12-07 | End: 2018-10-15

## 2018-01-29 RX ORDER — ACETAMINOPHEN 160 MG
TABLET,DISINTEGRATING ORAL
COMMUNITY
Start: 2017-12-07 | End: 2019-02-02

## 2018-01-29 RX ORDER — BLOOD-GLUCOSE METER
EACH MISCELLANEOUS
COMMUNITY
Start: 2014-06-04 | End: 2021-05-26

## 2018-01-29 RX ORDER — GLIPIZIDE AND METFORMIN HCL 2.5; 25 MG/1; MG/1
1 TABLET, FILM COATED ORAL
COMMUNITY
Start: 2017-05-24 | End: 2018-05-10

## 2018-01-29 RX ORDER — ATORVASTATIN CALCIUM 40 MG/1
40 TABLET, FILM COATED ORAL DAILY
COMMUNITY
Start: 2017-08-30 | End: 2018-10-15

## 2018-01-29 ASSESSMENT — PATIENT HEALTH QUESTIONNAIRE - PHQ9
SUM OF ALL RESPONSES TO PHQ QUESTIONS 1-9: 0
SUM OF ALL RESPONSES TO PHQ QUESTIONS 1-9: 0

## 2018-02-08 ENCOUNTER — AMBULATORY - GICH (OUTPATIENT)
Dept: SCHEDULING | Facility: OTHER | Age: 63
End: 2018-02-08

## 2018-02-09 VITALS
HEIGHT: 70 IN | SYSTOLIC BLOOD PRESSURE: 138 MMHG | WEIGHT: 197.5 LBS | HEART RATE: 60 BPM | DIASTOLIC BLOOD PRESSURE: 72 MMHG | BODY MASS INDEX: 28.27 KG/M2

## 2018-02-11 ASSESSMENT — PATIENT HEALTH QUESTIONNAIRE - PHQ9: SUM OF ALL RESPONSES TO PHQ QUESTIONS 1-9: 0

## 2018-02-12 NOTE — NURSING NOTE
Patient Information     Patient Name MRN Sex Oh Mnceal [id number] Male 1955      Nursing Note by Stephenie Varghese at 2017  8:00 AM     Author:  Stephenie Varghese Service:  (none) Author Type:  (none)     Filed:  2017  8:08 AM Encounter Date:  2017 Status:  Signed     :  Stephenie Varghese            Previous A1C is at goal of <8  HEMOGLOBIN A1C MONITORING (POCT)    Date Value   2017 7.2 % (H)   04/10/2013 6.5 % NGSP (H)     HEMOGLOBIN A1C SCREENING (% Total Hgb)    Date Value   04/10/2014 6.9 (H)     Urine microalbumin:creatine: 0.29  Foot exam this past year  Eye exam this past year    Patient is not a current smoker  Patient is on a daily aspirin  Patient is on a Statin.  Blood pressure today of   is at the goal of <139/89 for diabetics.    Stephenie Varghese LPN..............2017 7:58 AM

## 2018-02-12 NOTE — TELEPHONE ENCOUNTER
Patient Information     Patient Name MRN Sex Oh Mcneal [id number] Male 1955      Telephone Encounter by Stephenie Varghese at 2017  4:04 PM     Author:  Stephenie Varghese Service:  (none) Author Type:  (none)     Filed:  2017  4:04 PM Encounter Date:  2017 Status:  Signed     :  Stephenie Varghese            Patient notified of negative Cologuard results.      Stephenie Varghese LPN        2017 4:04 PM

## 2018-02-12 NOTE — PROGRESS NOTES
Patient Information     Patient Name MRN Sex Oh Mcneal [id number] Male 1955      Progress Notes by Moshe Wilson MD at 2017  8:00 AM     Author:  Moshe Wilson MD Service:  (none) Author Type:  Physician     Filed:  2017  8:58 AM Encounter Date:  2017 Status:  Signed     :  Moshe Wilson MD (Physician)            Nursing Notes:   Stephenie Varghese  2017  8:08 AM  Signed  Previous A1C is at goal of <8  HEMOGLOBIN A1C MONITORING (POCT)    Date Value   2017 7.2 % (H)   04/10/2013 6.5 % NGSP (H)     HEMOGLOBIN A1C SCREENING (% Total Hgb)    Date Value   04/10/2014 6.9 (H)     Urine microalbumin:creatine: 0.29  Foot exam this past year  Eye exam this past year    Patient is not a current smoker  Patient is on a daily aspirin  Patient is on a Statin.  Blood pressure today of   is at the goal of <139/89 for diabetics.    Stephenie Varghese LPN..............2017 7:58 AM    Oh Delarosa presents to clinic today for:   Chief Complaint    Patient presents with      Diabetes     HPI: Mr. Delarosa is a 62 y.o. male who presents today for evaluation of above.     (I10) Hypertension  (primary encounter diagnosis)  (E11.8,  Z79.4) Controlled type 2 diabetes mellitus with complication, with long-term current use of insulin (HC)  (E78.2) Mixed hyperlipidemia  (E55.9) Vitamin D deficiency  (E11.319) Diabetic retinopathy of left eye associated with type 2 diabetes mellitus, macular edema presence unspecified, unspecified retinopathy severity (HC)     Hypertension, currently well controlled.  Tolerating medications.  Labs just collected, creatinine is stable.      Diabetes, insulin-dependent, currently well controlled.  Previously uncontrolled.  Has some retinopathy, needs to see ophthalmology again before the end of the year.  Doing very well with the glipizide/metformin combo prior to meals.  I did try to watch his caloric intake and carbohydrate intake.  HEMOGLOBIN A1C  MONITORING (POCT)    Date Value   2017 7.2 % (H)   04/10/2013 6.5 % NGSP (H)     HEMOGLOBIN A1C SCREENING (% Total Hgb)    Date Value   04/10/2014 6.9 (H)     Hyperlipidemia - currently on Lipitor 40 mg daily.  Tolerating well.  Last Lipids:  Chol: 2017 114   74  HDL: 2017 36   LDL: 2017 63    Vitamin D deficiency, taking oral replacement.  Feels that his aches and pains have improved.  VITAMIN D TOTAL      Date Value Ref Range Status   04/10/2014 18.8 (L) 30.0 - 80.0 ng/mL Final   2013 15.5 (L) 30.0 - 80.0 ng/mL Final     VITAMIN D TOTAL AFL      Date Value Ref Range Status   2016 28.4   ng/mL Final   2015 25.0   ng/mL Final     Mr. Parks Body mass index is 28.75 kg/(m^2). This is out of the normal range for a 62 y.o. Normal range for ages 18+ is between 18.5 and 24.9. To lose weight we reviewed risks and benefits of appropriate options such as diet, exercise, and medications. Patient's strategy will be  self-directed nutrition plan and self-directed exercise program   BP Readings from Last 1 Encounters:17 : 138/72  Mr. Parks blood pressure is out of the normal range for adults. Per JNC-8 guidelines normal adult blood pressure is < 120/80, pre-hypertensive is between 120/80 and 139/89, and hypertension is 140/90 or greater. Risks of hypertension were discussed. Patient's strategy will be weight loss, increased activity and reduced salt intake    Functional Capacity: > 4 METS.   Reports that he can climb a flight of stairs without any chest pain/heaviness or shortness of breath.   Patient reports no current symptoms of fevers, chills, nausea/vomiting.   No cough. No shortness of breath.   No change in bowel/bladder habits. No melena, hematochezia. No Hematuria.   No rashes. No palpitations.  No orthopnea/paroxysmal nocturnal dyspnea   No vision or hearing issues.   No significant mood issues   No bruising.     SHIRLENE:  No flowsheet data found.    PHQ9:  PHQ  Depression Screening 8/30/2017 12/7/2017   Date of PHQ exam (doc flow) 8/30/2017 12/7/2017   1. Lack of interest/pleasure 0 - Not at all 0 - Not at all   2. Feeling down/depressed 0 - Not at all 0 - Not at all   PHQ-2 TOTAL SCORE 0 0   3. Trouble sleeping 0 - Not at all 0 - Not at all   4. Decreased energy 0 - Not at all 0 - Not at all   5. Appetite change 0 - Not at all 0 - Not at all   6. Feelings of failure 0 - Not at all 0 - Not at all   7. Trouble concentrating 0 - Not at all 0 - Not at all   8. Activity level 0 - Not at all 0 - Not at all   9. Hurting yourself 0 - Not at all 0 - Not at all   PHQ-9 TOTAL SCORE 0 0   PHQ-9 Severity Level none none   Functional Impairment not difficult at all not difficult at all   Some recent data might be hidden          I have personally reviewed the past medical history, past surgical history, medications, allergies, family and social history as listed below, on 12/7/2017.    Patient Active Problem List      Diagnosis Date Noted     Controlled type 2 diabetes mellitus with complication, with long-term current use of insulin (HC) 10/25/2016     Chronic back pain 04/30/2015     h/o Leiomyoma of the skin of right breast 04/29/2015     Strain of flexor muscle of right hip 12/17/2014     Actinic keratosis of right temple 12/17/2014     History of nephrolithiasis - about 2013 06/24/2014     Fatigue 05/06/2014     H/O hyperparathyroidism 04/10/2014     S/P subtotal parathyroidectomy (HC) 04/10/2014     Hypertension 04/10/2014     Vitamin D deficiency 04/10/2014     Increased PTH level 04/10/2014     Diabetic retinopathy associated with type 2 diabetes mellitus (HC) 11/25/2013     Lung nodule 11/25/2013     HEMATURIA UNSPECIFIED 07/18/2012     HYPERLIPIDEMIA 01/03/2011     PERIPHERAL NEUROPATHY      Past Medical History:     Diagnosis  Date     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       atorvastatin (LIPITOR) 40 mg tablet Take 1 tablet by mouth once daily. 90 tablet 3     blood sugar diagnostic (ACCU-CHEK JAMES PLUS TEST STRP) strip Dispense item covered by pt ins. E11.65 IDDM type II, uncontrolled - Test 4 times/day. Reason: High A1C 400 Each 3     Blood-Glucose Meter (ACCU-CHEK JAMES PLUS METER) Dispense glucose meter, test strips and lancets covered by the patient insurance. Test 4 times per day. 1 Device 0     CALCIUM CARBONATE/VITAMIN D2 (CALCIUM 500 WITH VITAMIN D ORAL) Take  by mouth once daily. Take two tablets by mouth daily       cholecalciferol (VITAMIN D-3) 2,000 unit capsule 4000 to 5000 IU daily For Vitamin D Deficiency 100 capsule 3     glipiZIDE (GLUCOTROL) 10 mg tablet Take 1 tablet by mouth 2 times daily before meals. 180 tablet 3     glipiZIDE-metFORMIN (METAGLIP) 2.5-250 mg per tablet Take 1 tablet by mouth once daily with a meal. -- With Supper Every Evening 90 tablet 3     insulin glargine (LANTUS SOLOSTAR) 100 unit/mL (3 mL) pen Inject 15-25 Units subcutaneous before bedtime. 20 pen 3     lancets Test 4 times per day. Dx E11.65 Insulin Dependent 400 Each 4     losartan (COZAAR) 50 mg tablet Take 1 tablet by mouth once daily. 90 tablet 3     metFORMIN (GLUCOPHAGE) 1,000 mg tablet Take 1 tablet by mouth 2 times daily with meals. 180 tablet 3     MARJORIE PEN NEEDLE 32 gauge x 5\" AS DIRECTED FOR ADMINISTERING INSULIN AT HOME. FOR USE WITH LANTUS INJECTIONS ONCE DAILY. 100 Each 4     Allergies     Allergen  Reactions     Lisinopril Cough     Family History       Problem   Relation Age of Onset     Heart Disease  Father       at age 73, CAD, complications of percutaneous revascularization of abdominal aortic aneurysm        Good Health  Mother      83       Alcohol/Drug  Brother  " "    Alcoholism        Family Status     Relation  Status     Father  at age 73     of CAD, complications of percutaneous revascularization of abdominal aortic aneurysm       Mother Alive    1927      Sister Alive    obese      Sister Alive    obese      Sister Alive    obese      Brother Alive    born , no contact      Brother      Social History     Social History        Marital status:       Spouse name: N/A     Number of children:  N/A     Years of education:  N/A     Social History Main Topics       Smoking status: Never Smoker     Smokeless tobacco: Never Used     Alcohol use No     Drug use: Not on file     Sexual activity: Not on file     Other Topics  Concern     Not on file      Social History Narrative     Never a smoker.  Walks regularly,      Pertinent ROS was performed and was negative as noted in HPI above.     EXAM:   Vitals:     17 0800   BP: 138/72   Pulse: 60   Weight: 89.6 kg (197 lb 8 oz)   Height: 1.765 m (5' 9.5\")     BP Readings from Last 3 Encounters:    17 138/72   17 138/80   17 134/88     Wt Readings from Last 3 Encounters:    17 89.6 kg (197 lb 8 oz)   17 89.4 kg (197 lb)   17 87.7 kg (193 lb 4 oz)     Estimated body mass index is 28.75 kg/(m^2) as calculated from the following:    Height as of this encounter: 1.765 m (5' 9.5\").    Weight as of this encounter: 89.6 kg (197 lb 8 oz).     EXAM:  Constitutional: Pleasant, alert, appropriate appearance for age. No acute distress  ENT: Normocephalic, Atraumatic, Thyroid without nodules or tenderness   Nose/Mouth: Oral pharynx without erythema or exudates, Nose is patent bilaterally, no rhinorrhea and Dental hygeine adequate   Eyes:  Extraocular muscles intact, Sclera non-icteric, Conjunctiva without erythema  Lymphatic Exam: Non-palpable nodes in neck, clavicular regions  Pulmonary: Lungs are clear to auscultation bilaterally, without wheezes or crackles  Cardiovascular " Exam: regular rate and rhythm, trace pedal edema present  Gastrointestinal Exam: Soft, non-tender, non-distended, positive bowel sounds  Integument: No abnormal rashes, sores, or ulcerations noted  Neurologic Exam: CN 3-12 grossly intact   Musculoskeletal Exam: Moves upper and lower extremities symmetrically, No focal weakness  Gait and station appear grossly normal  Psychiatric Exam: Awake and Alert, Affect and mood appropriate  Speech is fluent, Thought process is normal    INVESTIGATIONS:  -- see below.     ASSESSMENT AND PLAN:  Oh was seen today for diabetes.    Diagnoses and all orders for this visit:    Hypertension  -     losartan (COZAAR) 50 mg tablet; Take 1 tablet by mouth once daily.    Controlled type 2 diabetes mellitus with complication, with long-term current use of insulin (HC)  -     blood sugar diagnostic (ACCU-CHEK JAMES PLUS TEST STRP) strip; Dispense item covered by pt ins. E11.65 IDDM type II, uncontrolled - Test 4 times/day. Reason: High A1C  -     glipiZIDE (GLUCOTROL) 10 mg tablet; Take 1 tablet by mouth 2 times daily before meals.  -     lancets; Test 4 times per day. Dx E11.65 Insulin Dependent  -     metFORMIN (GLUCOPHAGE) 1,000 mg tablet; Take 1 tablet by mouth 2 times daily with meals.    Mixed hyperlipidemia    Vitamin D deficiency  -     cholecalciferol (VITAMIN D-3) 2,000 unit capsule; 4000 to 5000 IU daily For Vitamin D Deficiency    Diabetic retinopathy of left eye associated with type 2 diabetes mellitus, macular edema presence unspecified, unspecified retinopathy severity (HC)      lab results and schedule of future lab studies reviewed with patient, reviewed diet, exercise and weight control, recommended sodium restriction, cardiovascular risk and specific lipid/LDL goals reviewed, specific diabetic recommendations low cholesterol diet, weight control and daily exercise discussed, home glucose monitoring emphasized, foot care discussed and Podiatry visits discussed, annual  eye examinations at Ophthalmology discussed, glycohemoglobin and other lab monitoring discussed and long term diabetic complications discussed, use of aspirin to prevent MI and TIA's discussed    -- Expected clinical course discussed   -- Medications and their side effects discussed    Oh is also recommended to eat a heart-healthy diet, do regular aerobic exercises, maintain a desirable body weight, and avoid tobacco products. These recommendations are from the American Heart Association (AHA) which stresses the importance of lifestyle changes to lower cardiovascular disease risk.     Return in about 3 months (around 3/7/2018) for -- labs in 91+ days with Diabetes clinic appointment with Dr. Wilson 1-2 days later..    Patient Instructions     Labs are looking better....       -- Try to avoid Carbohydrates as much as possible -- breads, pasta, baked goods, cakes, oatmeal, cold cereal, potatoes.   These are turned to sugar in one metabolic conversion, cause insulin secretion and increased fat deposition / weight gain.      -- Eat more lean meats, proteins, eggs, nuts.       Results for orders placed or performed in visit on 12/06/17      CBC W PLT NO DIFF      Result  Value Ref Range    WHITE BLOOD COUNT         7.7 4.5 - 11.0 thou/cu mm    RED BLOOD COUNT           5.29 4.30 - 5.90 mil/cu mm    HEMOGLOBIN                16.0 13.5 - 17.5 g/dL    HEMATOCRIT                46.7 37.0 - 53.0 %    MCV                       88 80 - 100 fL    MCH                       30.2 26.0 - 34.0 pg    MCHC                      34.3 32.0 - 36.0 g/dL    RDW                       13.0 11.5 - 15.5 %    PLATELET COUNT            231 140 - 440 thou/cu mm    MPV                       10.4 6.5 - 11.0 fL   COMP METABOLIC PANEL      Result  Value Ref Range    SODIUM 137 133 - 143 mmol/L    POTASSIUM 4.5 3.5 - 5.1 mmol/L    CHLORIDE 104 98 - 107 mmol/L    CO2,TOTAL 27 21 - 31 mmol/L    ANION GAP 6 5 - 18                    GLUCOSE 119 (H) 70  - 105 mg/dL    CALCIUM 9.1 8.6 - 10.3 mg/dL    BUN 16 7 - 25 mg/dL    CREATININE 1.01 0.70 - 1.30 mg/dL    BUN/CREAT RATIO           16                    GFR if African American >60 >60 ml/min/1.73m2    GFR if not African American >60 >60 ml/min/1.73m2    ALBUMIN 4.2 3.5 - 5.7 g/dL    PROTEIN,TOTAL 7.4 6.4 - 8.9 g/dL    GLOBULIN                  3.2 2.0 - 3.7 g/dL    A/G RATIO 1.3 1.0 - 2.0                    BILIRUBIN,TOTAL 0.5 0.3 - 1.0 mg/dL    ALK PHOSPHATASE 75 34 - 104 IU/L    ALT (SGPT) 22 7 - 52 IU/L    AST (SGOT) 22 13 - 39 IU/L   HEMOGLOBIN A1C MONITORING (POCT)      Result  Value Ref Range    HEMOGLOBIN A1C MONITORING (POCT) 7.2 (H) 4.0 - 6.2 %    ESTIMATED AVERAGE GLUCOSE  160 mg/dL   LIPID PANEL      Result  Value Ref Range    CHOLESTEROL,TOTAL 114 <200 mg/dL    TRIGLYCERIDES 74 <150 mg/dL    HDL CHOLESTEROL 36 23 - 92 mg/dL    NON-HDL CHOLESTEROL 78 <145 mg/dl    CHOL/HDL RATIO            3.17 <4.50                    LDL CHOLESTEROL 63 <100 mg/dL    PROVIDER ORDERED STATUS RANDOM         Return for Diabetes labs and clinic follow-up appointment every 3 to 4 months.  --- (Go for about 91 to 100 days)    Schedule lab only appointment --- A few days AFTER: 03/07/18     Schedule clinic appointment with Dr. Wilson -- Same day as labs, or 1-2 days later.     Insurance companies are now grading you and I on your blood sugar control -- Goal is to get your A1c down to 7.9% or lower and NO Smoking!    -- Medicare and most insurance companies, will only cover Hemoglobin A1c labs to be rechecked every 91+ days.      HEMOGLOBIN A1C MONITORING (POCT)    Date Value   12/06/2017 7.2 % (H)   04/10/2013 6.5 % NGSP (H)     HEMOGLOBIN A1C SCREENING (% Total Hgb)    Date Value   04/10/2014 6.9 (H)        Next follow-up appointment with Dr. Wilson should be scheduled:  -- Approximately a few days AFTER: 03/07/18      Moshe Wilson MD

## 2018-03-06 ENCOUNTER — TELEPHONE (OUTPATIENT)
Dept: INTERNAL MEDICINE | Facility: OTHER | Age: 63
End: 2018-03-06

## 2018-03-06 DIAGNOSIS — E78.2 MIXED HYPERLIPIDEMIA: ICD-10-CM

## 2018-03-06 DIAGNOSIS — E11.8 CONTROLLED TYPE 2 DIABETES MELLITUS WITH COMPLICATION, WITH LONG-TERM CURRENT USE OF INSULIN (H): Primary | ICD-10-CM

## 2018-03-06 DIAGNOSIS — Z79.4 CONTROLLED TYPE 2 DIABETES MELLITUS WITH COMPLICATION, WITH LONG-TERM CURRENT USE OF INSULIN (H): Primary | ICD-10-CM

## 2018-03-06 DIAGNOSIS — E78.5 HYPERLIPIDEMIA: ICD-10-CM

## 2018-03-12 DIAGNOSIS — E78.2 MIXED HYPERLIPIDEMIA: ICD-10-CM

## 2018-03-12 DIAGNOSIS — E11.8 CONTROLLED TYPE 2 DIABETES MELLITUS WITH COMPLICATION, WITH LONG-TERM CURRENT USE OF INSULIN (H): ICD-10-CM

## 2018-03-12 DIAGNOSIS — Z79.4 CONTROLLED TYPE 2 DIABETES MELLITUS WITH COMPLICATION, WITH LONG-TERM CURRENT USE OF INSULIN (H): ICD-10-CM

## 2018-03-12 LAB
ALBUMIN SERPL-MCNC: 4.5 G/DL (ref 3.5–5.7)
ALBUMIN UR-MCNC: NEGATIVE MG/DL
ALP SERPL-CCNC: 72 U/L (ref 34–104)
ALT SERPL W P-5'-P-CCNC: 17 U/L (ref 7–52)
ANION GAP SERPL CALCULATED.3IONS-SCNC: 7 MMOL/L (ref 3–14)
APPEARANCE UR: CLEAR
AST SERPL W P-5'-P-CCNC: 18 U/L (ref 13–39)
BILIRUB SERPL-MCNC: 0.6 MG/DL (ref 0.3–1)
BILIRUB UR QL STRIP: NEGATIVE
BUN SERPL-MCNC: 15 MG/DL (ref 7–25)
CALCIUM SERPL-MCNC: 9.7 MG/DL (ref 8.6–10.3)
CHLORIDE SERPL-SCNC: 103 MMOL/L (ref 98–107)
CHOLEST SERPL-MCNC: 135 MG/DL
CO2 SERPL-SCNC: 31 MMOL/L (ref 21–31)
COLOR UR AUTO: YELLOW
CREAT SERPL-MCNC: 0.93 MG/DL (ref 0.7–1.3)
CREAT UR-MCNC: 74 MG/DL
ERYTHROCYTE [DISTWIDTH] IN BLOOD BY AUTOMATED COUNT: 13.2 % (ref 10–15)
GFR SERPL CREATININE-BSD FRML MDRD: 82 ML/MIN/1.7M2
GLUCOSE SERPL-MCNC: 96 MG/DL (ref 70–105)
GLUCOSE UR STRIP-MCNC: NEGATIVE MG/DL
HBA1C MFR BLD: 7.8 % (ref 4–6)
HCT VFR BLD AUTO: 45.5 % (ref 40–53)
HDLC SERPL-MCNC: 41 MG/DL (ref 23–92)
HGB BLD-MCNC: 15.7 G/DL (ref 13.3–17.7)
HGB UR QL STRIP: NEGATIVE
KETONES UR STRIP-MCNC: NEGATIVE MG/DL
LDLC SERPL CALC-MCNC: 75 MG/DL
LEUKOCYTE ESTERASE UR QL STRIP: NEGATIVE
MCH RBC QN AUTO: 29.9 PG (ref 26.5–33)
MCHC RBC AUTO-ENTMCNC: 34.5 G/DL (ref 31.5–36.5)
MCV RBC AUTO: 87 FL (ref 78–100)
MICROALBUMIN UR-MCNC: 6 MG/L
MICROALBUMIN/CREAT UR: 7.61 MG/G CR (ref 0–17)
NITRATE UR QL: NEGATIVE
NONHDLC SERPL-MCNC: 94 MG/DL
PH UR STRIP: 5.5 PH (ref 5–7)
PLATELET # BLD AUTO: 233 10E9/L (ref 150–450)
POTASSIUM SERPL-SCNC: 4.2 MMOL/L (ref 3.5–5.1)
PROT SERPL-MCNC: 7.7 G/DL (ref 6.4–8.9)
RBC # BLD AUTO: 5.25 10E12/L (ref 4.4–5.9)
SODIUM SERPL-SCNC: 141 MMOL/L (ref 134–144)
SOURCE: NORMAL
SP GR UR STRIP: 1.01 (ref 1–1.03)
TRIGL SERPL-MCNC: 94 MG/DL
UROBILINOGEN UR STRIP-ACNC: 0.2 EU/DL (ref 0.2–1)
WBC # BLD AUTO: 13.1 10E9/L (ref 4–11)

## 2018-03-12 PROCEDURE — 36415 COLL VENOUS BLD VENIPUNCTURE: CPT | Performed by: INTERNAL MEDICINE

## 2018-03-12 PROCEDURE — 81003 URINALYSIS AUTO W/O SCOPE: CPT | Performed by: INTERNAL MEDICINE

## 2018-03-12 PROCEDURE — 80061 LIPID PANEL: CPT | Performed by: INTERNAL MEDICINE

## 2018-03-12 PROCEDURE — 85027 COMPLETE CBC AUTOMATED: CPT | Performed by: INTERNAL MEDICINE

## 2018-03-12 PROCEDURE — 83036 HEMOGLOBIN GLYCOSYLATED A1C: CPT | Performed by: INTERNAL MEDICINE

## 2018-03-12 PROCEDURE — 82043 UR ALBUMIN QUANTITATIVE: CPT | Performed by: INTERNAL MEDICINE

## 2018-03-12 PROCEDURE — 80053 COMPREHEN METABOLIC PANEL: CPT | Performed by: INTERNAL MEDICINE

## 2018-03-13 ENCOUNTER — OFFICE VISIT (OUTPATIENT)
Dept: INTERNAL MEDICINE | Facility: OTHER | Age: 63
End: 2018-03-13
Attending: INTERNAL MEDICINE
Payer: COMMERCIAL

## 2018-03-13 VITALS
WEIGHT: 199.38 LBS | BODY MASS INDEX: 29.02 KG/M2 | HEART RATE: 84 BPM | SYSTOLIC BLOOD PRESSURE: 138 MMHG | DIASTOLIC BLOOD PRESSURE: 80 MMHG

## 2018-03-13 DIAGNOSIS — E11.8 CONTROLLED TYPE 2 DIABETES MELLITUS WITH COMPLICATION, WITH LONG-TERM CURRENT USE OF INSULIN (H): Primary | ICD-10-CM

## 2018-03-13 DIAGNOSIS — I10 BENIGN ESSENTIAL HYPERTENSION: ICD-10-CM

## 2018-03-13 DIAGNOSIS — Z79.4 CONTROLLED TYPE 2 DIABETES MELLITUS WITH COMPLICATION, WITH LONG-TERM CURRENT USE OF INSULIN (H): Primary | ICD-10-CM

## 2018-03-13 DIAGNOSIS — E78.2 MIXED HYPERLIPIDEMIA: ICD-10-CM

## 2018-03-13 DIAGNOSIS — E55.9 VITAMIN D DEFICIENCY: ICD-10-CM

## 2018-03-13 DIAGNOSIS — E11.3292 MILD NONPROLIFERATIVE DIABETIC RETINOPATHY OF LEFT EYE WITHOUT MACULAR EDEMA ASSOCIATED WITH TYPE 2 DIABETES MELLITUS (H): ICD-10-CM

## 2018-03-13 PROCEDURE — G0463 HOSPITAL OUTPT CLINIC VISIT: HCPCS

## 2018-03-13 PROCEDURE — 99214 OFFICE O/P EST MOD 30 MIN: CPT | Performed by: INTERNAL MEDICINE

## 2018-03-13 ASSESSMENT — ENCOUNTER SYMPTOMS
FEVER: 0
WHEEZING: 0
CHILLS: 0
LIGHT-HEADEDNESS: 0
HEMATURIA: 0
SHORTNESS OF BREATH: 0
DYSURIA: 0
PALPITATIONS: 0
ARTHRALGIAS: 0
MYALGIAS: 0
BRUISES/BLEEDS EASILY: 0
EYE PAIN: 0
AGITATION: 0
NAUSEA: 0
COUGH: 0
ABDOMINAL PAIN: 0
DIARRHEA: 0
VOMITING: 0
DIZZINESS: 0
FATIGUE: 0
CONFUSION: 0

## 2018-03-13 ASSESSMENT — ANXIETY QUESTIONNAIRES
GAD7 TOTAL SCORE: 0
5. BEING SO RESTLESS THAT IT IS HARD TO SIT STILL: NOT AT ALL
2. NOT BEING ABLE TO STOP OR CONTROL WORRYING: NOT AT ALL
7. FEELING AFRAID AS IF SOMETHING AWFUL MIGHT HAPPEN: NOT AT ALL
3. WORRYING TOO MUCH ABOUT DIFFERENT THINGS: NOT AT ALL
1. FEELING NERVOUS, ANXIOUS, OR ON EDGE: NOT AT ALL
IF YOU CHECKED OFF ANY PROBLEMS ON THIS QUESTIONNAIRE, HOW DIFFICULT HAVE THESE PROBLEMS MADE IT FOR YOU TO DO YOUR WORK, TAKE CARE OF THINGS AT HOME, OR GET ALONG WITH OTHER PEOPLE: NOT DIFFICULT AT ALL
6. BECOMING EASILY ANNOYED OR IRRITABLE: NOT AT ALL

## 2018-03-13 ASSESSMENT — PATIENT HEALTH QUESTIONNAIRE - PHQ9: 5. POOR APPETITE OR OVEREATING: NOT AT ALL

## 2018-03-13 NOTE — NURSING NOTE
Previous A1C is at goal of <8  Lab Results   Component Value Date    A1C 7.8 03/12/2018     Urine microalbumin:creatine: n/a  Foot exam this past year-declines today  Eye exam this past year    Tobacco User no  Patient is on a daily aspirin  Patient is on a Statin.  Blood pressure today of:     BP Readings from Last 1 Encounters:   12/07/17 138/72      is at the goal of <139/89 for diabetics.    Stephenie Varghese LPN on 3/13/2018 at 7:57 AM

## 2018-03-13 NOTE — MR AVS SNAPSHOT
After Visit Summary   3/13/2018    Oh Delarosa    MRN: [id number]           Patient Information     Date Of Birth          1955        Visit Information        Provider Department      3/13/2018 8:00 AM Moshe Wilson MD [facility]        Today's Diagnoses     Controlled type 2 diabetes mellitus with complication, with long-term current use of insulin (H)    -  1    Mild nonproliferative diabetic retinopathy of left eye without macular edema associated with type 2 diabetes mellitus (H)        Vitamin D deficiency        Mixed hyperlipidemia        Benign essential hypertension          Care Instructions    All in all --- labs look good.     WBC count was slightly high... Will recheck in 3 months.     A1c has gone up some...     To help with weight loss and improve blood sugar control....    -- Try to avoid Carbohydrates as much as possible -- breads, pasta, baked goods, cakes, oatmeal, cold cereal, potatoes.   These are turned to sugar in one metabolic conversion, cause insulin secretion and increased fat deposition / weight gain.      -- Eat more lean meats, proteins, eggs, nuts.     Return for Diabetes labs and clinic follow-up appointment every 3 to 4 months.  --- (Go for about 91 to 100 days)    Aspects of Diabetes we can improve:  Hemoglobin A1c Lab Results   Component Value Date    A1C 7.8 03/12/2018    Goal range is under 8. Best is 6.5 to 7   Blood Pressure 138/80 Goal to keep less than 140/90   Tobacco  reports that he has never smoked. He has never used smokeless tobacco. Goal to abstain from tobacco   Aspirin or Plavix Aspirin Aspirin or Plavix reduces risk of heart disease and stroke   ACE/ARB Losartan These medications reduce risk of kidney disease   Cholesterol Lipitor Statins reduce risk of heart disease and stroke   Eye Exam -- Do Yearly -- Annual diabetic eye exam   Healthy weight Body mass index is 29.02 kg/(m^2). Goal BMI under 30, best is under 25.       -- Trying to exercise daily (goal at least 20 min/day) with moderate aerobic activity   -- Eat healthy (resources from ADA at http://www.diabetes.org/)   -- Taking good care of my feet. Consider seeing the Podiatrist   -- Check blood sugars as directed, record in log book and bring to every appointment      Schedule lab only appointment --- A few days AFTER: 6/11/18   Schedule clinic appointment with Dr. Wilson -- Same day as labs, or 1-2 days later.     Insurance companies are now grading you and I on your blood sugar control -- Goal is to get your A1c down to 7.9% or lower and NO Smoking!    -- Medicare and most insurance companies, will only cover Hemoglobin A1c labs to be rechecked every 91+ days.      Hemoglobin A1C   Date Value Ref Range Status   03/12/2018 7.8 (H) 4.0 - 6.0 % Final       Next follow-up appointment with Dr. Wilson should be scheduled:  -- Approximately a few days AFTER: 6/11/18             Follow-ups after your visit        Follow-up notes from your care team     Return in about 3 months (around 6/13/2018) for -- labs in 91+ days with Diabetes clinic appointment with Dr. Wilson 1-2 days later..      Your next 10 appointments already scheduled     Jun 18, 2018  7:50 AM CDT   [facility]    [address] [facility] [id number] [phone]           Please do not eat 10-12 hours before your appointment if you are coming in fasting for labs on lipids, cholesterol, or glucose (sugar). This does not apply to pregnant women. Water, hot tea and black coffee (with nothing added) are okay. Do not drink other fluids, diet soda or chew gum.            Jun 19, 2018  8:00 AM CDT   [facility] with Moshe Wilson MD   [facility]    [address]  [place] [id number] [phone]              Who to contact     If you have questions or need follow up  information about today's clinic visit or your schedule please contact [facility] directly at [phone].  Normal or non-critical lab and imaging results will be communicated to you by MyChart, letter or phone within 4 business days after the clinic has received the results. If you do not hear from us within 7 days, please contact the clinic through MyChart or phone. If you have a critical or abnormal lab result, we will notify you by phone as soon as possible.  Submit refill requests through [employer] or call your pharmacy and they will forward the refill request to us. Please allow 3 business days for your refill to be completed.          Additional Information About Your Visit        Care EveryWhere ID     This is your Care EveryWhere ID. This could be used by other organizations to access your [place] medical records  [id number]        Your Vitals Were     Pulse BMI (Body Mass Index)                84 29.02 kg/m2           Blood Pressure from Last 3 Encounters:   03/13/18 138/80   12/07/17 138/72   08/30/17 138/80    Weight from Last 3 Encounters:   03/13/18 90.4 kg (199 lb 6 oz)   12/07/17 89.6 kg (197 lb 8 oz)   08/30/17 89.4 kg (197 lb)              Today, you had the following     No orders found for display       Primary Care Provider Office Phone # Fax #    Moshe Wilson [phone]       [address] [facility] [address]        Equal Access to Services     [facility] AH: Hadii armando grovero Sonicole, waaxda luqadaha, qaybta kaalmada sonam hale. So [facility] [phone].    ATENCIÓN: Si habla español, tiene a hogue disposición servicios gratuitos de asistencia lingüística. Llame al [phone].    We comply with applicable federal civil rights laws and [place] laws. We do not discriminate on the basis of race, color, national origin, age, disability, sex, sexual orientation, or gender identity.            Thank  you!     Thank you for choosing [facility] AND [facility]  for your care. Our goal is always to provide you with excellent care. Hearing back from our patients is one way we can continue to improve our services. Please take a few minutes to complete the written survey that you may receive in the mail after your visit with us. Thank you!             Your Updated Medication List - Protect others around you: Learn how to safely use, store and throw away your medicines at www.disposemymeds.org.          This list is accurate as of 3/13/18  8:24 AM.  Always use your most recent med list.                   Brand Name Dispense Instructions for use Diagnosis    atorvastatin 40 MG tablet    LIPITOR     Take 40 mg by mouth daily        BD MARJORIE U/F 32G X 4 MM   Generic drug:  insulin pen needle      AS DIRECTED FOR ADMINISTERING INSULIN AT HOME. FOR USE WITH LANTUS INJECTIONS ONCE DAILY.        CALCIUM 500 +D PO      Take 2 tablets by mouth daily        glipiZIDE 10 MG tablet    GLUCOTROL     Take 10 mg by mouth 2 times daily (before meals)        glipiZIDE-metFORMIN 2.5-250 MG per tablet    METAGLIP     Take 1 tablet by mouth daily (with dinner)        GLUCOCOM BLOOD GLUCOSE MONITOR Luiza      Dispense glucose meter, test strips and lancets covered by the patient insurance. Test 4 times per day.        GOODSENSE ASPIRIN 325 MG tablet   Generic drug:  aspirin      Take 325 mg by mouth daily with food        insulin glargine 100 UNIT/ML injection    LANTUS     Inject 15-25 Units Subcutaneous At Bedtime        losartan 50 MG tablet    COZAAR     Take 50 mg by mouth daily        metFORMIN 1000 MG tablet    GLUCOPHAGE     Take 1,000 mg by mouth 2 times daily (with meals)        vitamin D3 2000 UNITS Caps      4000 to 5000 IU daily For Vitamin D Deficiency

## 2018-03-13 NOTE — PATIENT INSTRUCTIONS
All in all --- labs look good.     WBC count was slightly high... Will recheck in 3 months.     A1c has gone up some...     To help with weight loss and improve blood sugar control....    -- Try to avoid Carbohydrates as much as possible -- breads, pasta, baked goods, cakes, oatmeal, cold cereal, potatoes.   These are turned to sugar in one metabolic conversion, cause insulin secretion and increased fat deposition / weight gain.      -- Eat more lean meats, proteins, eggs, nuts.     Return for Diabetes labs and clinic follow-up appointment every 3 to 4 months.  --- (Go for about 91 to 100 days)    Aspects of Diabetes we can improve:  Hemoglobin A1c Lab Results   Component Value Date    A1C 7.8 03/12/2018    Goal range is under 8. Best is 6.5 to 7   Blood Pressure 138/80 Goal to keep less than 140/90   Tobacco  reports that he has never smoked. He has never used smokeless tobacco. Goal to abstain from tobacco   Aspirin or Plavix Aspirin Aspirin or Plavix reduces risk of heart disease and stroke   ACE/ARB Losartan These medications reduce risk of kidney disease   Cholesterol Lipitor Statins reduce risk of heart disease and stroke   Eye Exam -- Do Yearly -- Annual diabetic eye exam   Healthy weight Body mass index is 29.02 kg/(m^2). Goal BMI under 30, best is under 25.      -- Trying to exercise daily (goal at least 20 min/day) with moderate aerobic activity   -- Eat healthy (resources from ADA at http://www.diabetes.org/)   -- Taking good care of my feet. Consider seeing the Podiatrist   -- Check blood sugars as directed, record in log book and bring to every appointment      Schedule lab only appointment --- A few days AFTER: 6/11/18   Schedule clinic appointment with Dr. Wilson -- Same day as labs, or 1-2 days later.     Insurance companies are now grading you and I on your blood sugar control -- Goal is to get your A1c down to 7.9% or lower and NO Smoking!    -- Medicare and most insurance companies, will only  cover Hemoglobin A1c labs to be rechecked every 91+ days.      Hemoglobin A1C   Date Value Ref Range Status   03/12/2018 7.8 (H) 4.0 - 6.0 % Final       Next follow-up appointment with Dr. Wilson should be scheduled:  -- Approximately a few days AFTER: 6/11/18

## 2018-03-13 NOTE — PROGRESS NOTES
Nursing Notes:   Stephenie Varghese LPN  3/13/2018  8:10 AM  Signed  Previous A1C is at goal of <8  Lab Results   Component Value Date    A1C 7.8 03/12/2018     Urine microalbumin:creatine: n/a  Foot exam this past year-declines today  Eye exam this past year    Tobacco User no  Patient is on a daily aspirin  Patient is on a Statin.  Blood pressure today of:     BP Readings from Last 1 Encounters:   12/07/17 138/72      is at the goal of <139/89 for diabetics.    Stephenie Varghese LPN on 3/13/2018 at 7:57 AM      Nursing note reviewed with patient.  Accurracy and completeness verified.   Mr. Delarosa is a 63 year old male who:  Patient presents with:  Diabetes    HPI     ICD-10-CM    1. Controlled type 2 diabetes mellitus with complication, with long-term current use of insulin (H) E11.8     Z79.4    2. Mild nonproliferative diabetic retinopathy of left eye without macular edema associated with type 2 diabetes mellitus (H) E11.3292    3. Vitamin D deficiency E55.9    4. Mixed hyperlipidemia E78.2    5. Benign essential hypertension I10      Diabetes, currently controlled.  States that he has not been taking metformin and glipizide at lunch but needs to start doing that again.  States his blood sugars in the afternoon are in the 130 range.  Mornings are typically 110.  Otherwise doing well with his current medications.  He has followed by appointment in the near future due to his retinopathy.  Diabetic ROS - Medication compliance:  compliant most of the time, Diabetic diet compliance:  noncompliant some of the time, Home glucose monitoring:  are performed regularly  Labs just collected, A1c did go up but is still controlled.    Vitamin D deficiency, still taking oral replacement.  Doing well.  Having less myalgia issues.    Hyperlipidemia, currently stable.  Tolerating Lipitor.  Labs just collected.    Hypertension, controlled.  Tolerating medications.  Labs just collected.  Creatinine is stable.    Functional Capacity: > 4  METS.   Reports that he can climb a flight of stairs without any chest pain/heaviness or shortness of breath.   No orthopnea/paroxysmal nocturnal dyspnea  Review of Systems   Constitutional: Negative for chills, fatigue and fever.   HENT: Negative for congestion and hearing loss.    Eyes: Negative for pain and visual disturbance.   Respiratory: Negative for cough, shortness of breath and wheezing.    Cardiovascular: Negative for chest pain and palpitations.   Gastrointestinal: Negative for abdominal pain, diarrhea, nausea and vomiting.   Endocrine: Negative for cold intolerance and heat intolerance.   Genitourinary: Negative for dysuria and hematuria.   Musculoskeletal: Negative for arthralgias and myalgias.   Skin: Negative for pallor.   Allergic/Immunologic: Negative for immunocompromised state.   Neurological: Negative for dizziness and light-headedness.   Hematological: Does not bruise/bleed easily.   Psychiatric/Behavioral: Negative for agitation and confusion.        SHIRLENE:   SHIRLENE-7 SCORE 3/13/2018   Total Score 0     PHQ9:  PHQ-9 SCORE 8/30/2017 12/7/2017 3/13/2018   Total Score 0 0 0       I have personally reviewed the past medical history, past surgical history, medications, allergies, family and social history as listed below, on 3/13/2018.    Allergies   Allergen Reactions     Lisinopril Cough       Current Outpatient Prescriptions   Medication Sig Dispense Refill     Calcium Carb-Cholecalciferol (CALCIUM 500 +D PO) Take 2 tablets by mouth daily       aspirin (GOODSENSE ASPIRIN) 325 MG tablet Take 325 mg by mouth daily with food       atorvastatin (LIPITOR) 40 MG tablet Take 40 mg by mouth daily       Blood Glucose Monitoring Suppl (GLUCOCOM BLOOD GLUCOSE MONITOR) JACKSON Dispense glucose meter, test strips and lancets covered by the patient insurance. Test 4 times per day.       Cholecalciferol (VITAMIN D3) 2000 UNITS CAPS 4000 to 5000 IU daily For Vitamin D Deficiency       glipiZIDE (GLUCOTROL) 10 MG tablet  Take 10 mg by mouth 2 times daily (before meals)       glipiZIDE-metFORMIN (METAGLIP) 2.5-250 MG per tablet Take 1 tablet by mouth daily (with dinner)       insulin glargine (LANTUS) 100 UNIT/ML injection Inject 15-25 Units Subcutaneous At Bedtime       losartan (COZAAR) 50 MG tablet Take 50 mg by mouth daily       metFORMIN (GLUCOPHAGE) 1000 MG tablet Take 1,000 mg by mouth 2 times daily (with meals)       insulin pen needle (BD MARJORIE U/F) 32G X 4 MM AS DIRECTED FOR ADMINISTERING INSULIN AT HOME. FOR USE WITH LANTUS INJECTIONS ONCE DAILY.          Patient Active Problem List    Diagnosis Date Noted     Mixed hyperlipidemia 03/06/2018     Priority: Medium     Benign essential hypertension 03/06/2018     Priority: Medium     Hereditary and idiopathic peripheral neuropathy 01/29/2018     Priority: Medium     Controlled type 2 diabetes mellitus with complication, with long-term current use of insulin (H) 10/25/2016     Priority: Medium     Chronic back pain 04/30/2015     Priority: Medium     Leiomyoma of the skin 04/29/2015     Priority: Medium     Actinic keratosis of right temple 12/17/2014     Priority: Medium     Strain of flexor muscle of right hip 12/17/2014     Priority: Medium     History of nephrolithiasis 06/24/2014     Priority: Medium     H/O hyperparathyroidism 04/10/2014     Priority: Medium     Increased PTH level 04/10/2014     Priority: Medium     S/P subtotal parathyroidectomy (H) 04/10/2014     Priority: Medium     Vitamin D deficiency 04/10/2014     Priority: Medium     Diabetic retinopathy associated with type 2 diabetes mellitus (H) 11/25/2013     Priority: Medium     Lung nodule 11/25/2013     Priority: Medium     Hematuria 07/18/2012     Priority: Medium     Past Medical History:   Diagnosis Date     Other ehrlichiosis     11/2007     Unspecified injury of left wrist, hand and finger(s), initial encounter     scaphoid bruise     Past Surgical History:   Procedure Laterality Date     OTHER  "SURGICAL HISTORY      ,[id number],PARATHYROIDECTOMY     TONSILLECTOMY      age 5     Social History     Social History     Marital status:      Spouse name: N/A     Number of children: N/A     Years of education: N/A     Social History Main Topics     Smoking status: Never Smoker     Smokeless tobacco: Never Used     Alcohol use No     Drug use: No     Sexual activity: Not Asked     Other Topics Concern     None     Social History Narrative    Never a smoker.  Walks regularly,      Family History   Problem Relation Age of Onset     HEART DISEASE Father      Heart Disease, at age 73, CAD, complications of percutaneous revascularization of abdominal aortic aneurysm     Family History Negative Mother      Good Health,83     Substance Abuse Brother      Alcohol/Drug,Alcoholism       EXAM:   Vitals:    18 0801   BP: 138/80   BP Location: Right arm   Patient Position: Sitting   Cuff Size: Adult Regular   Pulse: 84   Weight: 199 lb 6 oz (90.4 kg)       Current Pain Score: Data Unavailable     BP Readings from Last 3 Encounters:   18 138/80   17 138/72   17 138/80    Wt Readings from Last 3 Encounters:   18 199 lb 6 oz (90.4 kg)   17 197 lb 8 oz (89.6 kg)   17 197 lb (89.4 kg)      Estimated body mass index is 29.02 kg/(m^2) as calculated from the following:    Height as of 17: 5' 9.5\" (1.765 m).    Weight as of this encounter: 199 lb 6 oz (90.4 kg).     Physical Exam   Constitutional: He appears well-developed and well-nourished. No distress.   HENT:   Head: Normocephalic and atraumatic.   Eyes: Conjunctivae are normal. No scleral icterus.   Neck: No thyromegaly present.   Cardiovascular: Normal rate and regular rhythm.    Pulmonary/Chest: Effort normal. No respiratory distress. He has no wheezes.   Abdominal: Soft. There is no tenderness.   Musculoskeletal: He exhibits no tenderness or deformity.   Lymphadenopathy:     He has no cervical adenopathy. "   Neurological: He is alert. No cranial nerve deficit.   Skin: Skin is warm and dry.   Psychiatric: He has a normal mood and affect.       INVESTIGATIONS:  Results for orders placed or performed in visit on 03/12/18   Albumin Random Urine Quantitative with Creat Ratio   Result Value Ref Range    Creatinine Urine 74 mg/dL    Albumin Urine mg/L 6 mg/L    Albumin Urine mg/g Cr 7.61 0 - 17 mg/g Cr   CBC with platelets   Result Value Ref Range    WBC 13.1 (H) 4.0 - 11.0 10e9/L    RBC Count 5.25 4.4 - 5.9 10e12/L    Hemoglobin 15.7 13.3 - 17.7 g/dL    Hematocrit 45.5 40.0 - 53.0 %    MCV 87 78 - 100 fl    MCH 29.9 26.5 - 33.0 pg    MCHC 34.5 31.5 - 36.5 g/dL    RDW 13.2 10.0 - 15.0 %    Platelet Count 233 150 - 450 10e9/L   Comprehensive metabolic panel   Result Value Ref Range    Sodium 141 134 - 144 mmol/L    Potassium 4.2 3.5 - 5.1 mmol/L    Chloride 103 98 - 107 mmol/L    Carbon Dioxide 31 21 - 31 mmol/L    Anion Gap 7 3 - 14 mmol/L    Glucose 96 70 - 105 mg/dL    Urea Nitrogen 15 7 - 25 mg/dL    Creatinine 0.93 0.70 - 1.30 mg/dL    GFR Estimate 82 >60 mL/min/1.7m2    GFR Estimate If Black >90 >60 mL/min/1.7m2    Calcium 9.7 8.6 - 10.3 mg/dL    Bilirubin Total 0.6 0.3 - 1.0 mg/dL    Albumin 4.5 3.5 - 5.7 g/dL    Protein Total 7.7 6.4 - 8.9 g/dL    Alkaline Phosphatase 72 34 - 104 U/L    ALT 17 7 - 52 U/L    AST 18 13 - 39 U/L   Hemoglobin A1c   Result Value Ref Range    Hemoglobin A1C 7.8 (H) 4.0 - 6.0 %   Lipid Profile   Result Value Ref Range    Cholesterol 135 <200 mg/dL    Triglycerides 94 <150 mg/dL    HDL Cholesterol 41 23 - 92 mg/dL    LDL Cholesterol Calculated 75 <100 mg/dL    Non HDL Cholesterol 94 <130 mg/dL   *UA reflex to Microscopic   Result Value Ref Range    Color Urine Yellow     Appearance Urine Clear     Glucose Urine Negative NEG^Negative mg/dL    Bilirubin Urine Negative NEG^Negative    Ketones Urine Negative NEG^Negative mg/dL    Specific Gravity Urine 1.010 1.003 - 1.035    Blood Urine Negative  NEG^Negative    pH Urine 5.5 5.0 - 7.0 pH    Protein Albumin Urine Negative NEG^Negative mg/dL    Urobilinogen Urine 0.2 0.2 - 1.0 EU/dL    Nitrite Urine Negative NEG^Negative    Leukocyte Esterase Urine Negative NEG^Negative    Source Midstream Urine        ASSESSMENT AND PLAN:  Problem List Items Addressed This Visit        Digestive    Vitamin D deficiency       Endocrine    Controlled type 2 diabetes mellitus with complication, with long-term current use of insulin (H) - Primary    Diabetic retinopathy associated with type 2 diabetes mellitus (H)    Mixed hyperlipidemia       Circulatory    Benign essential hypertension        reviewed diet, exercise and weight control, recommended sodium restriction, cardiovascular risk and specific lipid/LDL goals reviewed, specific diabetic recommendations low cholesterol diet, weight control and daily exercise discussed and home glucose monitoring taught, technique demonstrated, use of aspirin to prevent MI and TIA's discussed  -- Expected clinical course discussed    -- Medications and their side effects discussed    The 10-year ASCVD risk score (Xiomara FISHER Jr, et al., 2013) is: 22%    Values used to calculate the score:      Age: 63 years      Sex: Male      Is Non- : No      Diabetic: Yes      Tobacco smoker: No      Systolic Blood Pressure: 138 mmHg      Is BP treated: Yes      HDL Cholesterol: 41 mg/dL      Total Cholesterol: 135 mg/dL    Patient Instructions     All in all --- labs look good.     WBC count was slightly high... Will recheck in 3 months.     A1c has gone up some...     To help with weight loss and improve blood sugar control....    -- Try to avoid Carbohydrates as much as possible -- breads, pasta, baked goods, cakes, oatmeal, cold cereal, potatoes.   These are turned to sugar in one metabolic conversion, cause insulin secretion and increased fat deposition / weight gain.      -- Eat more lean meats, proteins, eggs, nuts.     Return  for Diabetes labs and clinic follow-up appointment every 3 to 4 months.  --- (Go for about 91 to 100 days)    Aspects of Diabetes we can improve:  Hemoglobin A1c Lab Results   Component Value Date    A1C 7.8 03/12/2018    Goal range is under 8. Best is 6.5 to 7   Blood Pressure 138/80 Goal to keep less than 140/90   Tobacco  reports that he has never smoked. He has never used smokeless tobacco. Goal to abstain from tobacco   Aspirin or Plavix Aspirin Aspirin or Plavix reduces risk of heart disease and stroke   ACE/ARB Losartan These medications reduce risk of kidney disease   Cholesterol Lipitor Statins reduce risk of heart disease and stroke   Eye Exam -- Do Yearly -- Annual diabetic eye exam   Healthy weight Body mass index is 29.02 kg/(m^2). Goal BMI under 30, best is under 25.      -- Trying to exercise daily (goal at least 20 min/day) with moderate aerobic activity   -- Eat healthy (resources from ADA at http://www.diabetes.org/)   -- Taking good care of my feet. Consider seeing the Podiatrist   -- Check blood sugars as directed, record in log book and bring to every appointment      Schedule lab only appointment --- A few days AFTER: 6/11/18   Schedule clinic appointment with Dr. Wilson -- Same day as labs, or 1-2 days later.     Insurance companies are now grading you and I on your blood sugar control -- Goal is to get your A1c down to 7.9% or lower and NO Smoking!    -- Medicare and most insurance companies, will only cover Hemoglobin A1c labs to be rechecked every 91+ days.      Hemoglobin A1C   Date Value Ref Range Status   03/12/2018 7.8 (H) 4.0 - 6.0 % Final       Next follow-up appointment with Dr. Wilson should be scheduled:  -- Approximately a few days AFTER: 6/11/18       Moshe Wilson MD  Internal Medicine  [facility]

## 2018-03-14 ASSESSMENT — ANXIETY QUESTIONNAIRES: GAD7 TOTAL SCORE: 0

## 2018-03-14 ASSESSMENT — PATIENT HEALTH QUESTIONNAIRE - PHQ9: SUM OF ALL RESPONSES TO PHQ QUESTIONS 1-9: 0

## 2018-03-30 DIAGNOSIS — Z79.4 CONTROLLED TYPE 2 DIABETES MELLITUS WITH COMPLICATION, WITH LONG-TERM CURRENT USE OF INSULIN (H): Primary | ICD-10-CM

## 2018-03-30 DIAGNOSIS — E11.8 CONTROLLED TYPE 2 DIABETES MELLITUS WITH COMPLICATION, WITH LONG-TERM CURRENT USE OF INSULIN (H): Primary | ICD-10-CM

## 2018-04-03 NOTE — TELEPHONE ENCOUNTER
Prescription [place] per [facility] [place].  Meena Reynolds RN on 4/3/2018 at 12:19 PM

## 2018-05-10 DIAGNOSIS — E11.8 CONTROLLED TYPE 2 DIABETES MELLITUS WITH COMPLICATION, WITHOUT LONG-TERM CURRENT USE OF INSULIN (H): Primary | ICD-10-CM

## 2018-05-16 RX ORDER — GLIPIZIDE AND METFORMIN HCL 2.5; 25 MG/1; MG/1
TABLET, FILM COATED ORAL
Qty: 90 TABLET | Refills: 1 | Status: SHIPPED | OUTPATIENT
Start: 2018-05-16 | End: 2018-10-15

## 2018-05-16 NOTE — TELEPHONE ENCOUNTER
Glipizide-metformin  LOV and labs-03/13/2018    Future Office visit:    Next 5 appointments (look out 90 days)     Jun 19, 2018  8:00 AM CDT   SHORT with Moshe Wilson MD   [facility]    [address]  [place] [address]   [phone]                 Prescription refilled per RN Medication RefillPolicy.................... Cydney Charlton ....................  5/16/2018   9:31 AM

## 2018-06-22 DIAGNOSIS — Z79.4 CONTROLLED TYPE 2 DIABETES MELLITUS WITH COMPLICATION, WITH LONG-TERM CURRENT USE OF INSULIN (H): Primary | ICD-10-CM

## 2018-06-22 DIAGNOSIS — E11.8 CONTROLLED TYPE 2 DIABETES MELLITUS WITH COMPLICATION, WITH LONG-TERM CURRENT USE OF INSULIN (H): Primary | ICD-10-CM

## 2018-06-25 NOTE — TELEPHONE ENCOUNTER
Prescription approved per [facility] Refill Protocol.  Meena Reynolds RN on 6/25/2018 at 9:36 AM

## 2018-07-09 DIAGNOSIS — Z79.4 CONTROLLED TYPE 2 DIABETES MELLITUS WITH COMPLICATION, WITH LONG-TERM CURRENT USE OF INSULIN (H): ICD-10-CM

## 2018-07-09 DIAGNOSIS — E11.8 CONTROLLED TYPE 2 DIABETES MELLITUS WITH COMPLICATION, WITH LONG-TERM CURRENT USE OF INSULIN (H): ICD-10-CM

## 2018-07-09 DIAGNOSIS — E78.2 MIXED HYPERLIPIDEMIA: ICD-10-CM

## 2018-07-09 LAB
ALBUMIN SERPL-MCNC: 4.3 G/DL (ref 3.5–5.7)
ALBUMIN UR-MCNC: NEGATIVE MG/DL
ALP SERPL-CCNC: 80 U/L (ref 34–104)
ALT SERPL W P-5'-P-CCNC: 15 U/L (ref 7–52)
ANION GAP SERPL CALCULATED.3IONS-SCNC: 4 MMOL/L (ref 3–14)
APPEARANCE UR: CLEAR
AST SERPL W P-5'-P-CCNC: 16 U/L (ref 13–39)
BILIRUB SERPL-MCNC: 0.4 MG/DL (ref 0.3–1)
BILIRUB UR QL STRIP: NEGATIVE
BUN SERPL-MCNC: 13 MG/DL (ref 7–25)
CALCIUM SERPL-MCNC: 9.6 MG/DL (ref 8.6–10.3)
CHLORIDE SERPL-SCNC: 104 MMOL/L (ref 98–107)
CHOLEST SERPL-MCNC: 127 MG/DL
CO2 SERPL-SCNC: 29 MMOL/L (ref 21–31)
COLOR UR AUTO: YELLOW
CREAT SERPL-MCNC: 0.92 MG/DL (ref 0.7–1.3)
CREAT UR-MCNC: 32 MG/DL
ERYTHROCYTE [DISTWIDTH] IN BLOOD BY AUTOMATED COUNT: 13.1 % (ref 10–15)
GFR SERPL CREATININE-BSD FRML MDRD: 83 ML/MIN/1.7M2
GLUCOSE SERPL-MCNC: 149 MG/DL (ref 70–105)
GLUCOSE UR STRIP-MCNC: NEGATIVE MG/DL
HBA1C MFR BLD: 7.9 % (ref 4–6)
HCT VFR BLD AUTO: 45.2 % (ref 40–53)
HDLC SERPL-MCNC: 41 MG/DL (ref 23–92)
HGB BLD-MCNC: 15.4 G/DL (ref 13.3–17.7)
HGB UR QL STRIP: NEGATIVE
KETONES UR STRIP-MCNC: NEGATIVE MG/DL
LDLC SERPL CALC-MCNC: 72 MG/DL
LEUKOCYTE ESTERASE UR QL STRIP: NEGATIVE
MCH RBC QN AUTO: 29.8 PG (ref 26.5–33)
MCHC RBC AUTO-ENTMCNC: 34.1 G/DL (ref 31.5–36.5)
MCV RBC AUTO: 88 FL (ref 78–100)
MICROALBUMIN UR-MCNC: 6 MG/L
MICROALBUMIN/CREAT UR: 19.62 MG/G CR (ref 0–17)
NITRATE UR QL: NEGATIVE
NONHDLC SERPL-MCNC: 86 MG/DL
PH UR STRIP: 6.5 PH (ref 5–7)
PLATELET # BLD AUTO: 222 10E9/L (ref 150–450)
POTASSIUM SERPL-SCNC: 4.2 MMOL/L (ref 3.5–5.1)
PROT SERPL-MCNC: 7 G/DL (ref 6.4–8.9)
RBC # BLD AUTO: 5.16 10E12/L (ref 4.4–5.9)
SODIUM SERPL-SCNC: 137 MMOL/L (ref 134–144)
SOURCE: NORMAL
SP GR UR STRIP: 1.01 (ref 1–1.03)
TRIGL SERPL-MCNC: 70 MG/DL
UROBILINOGEN UR STRIP-ACNC: 0.2 EU/DL (ref 0.2–1)
WBC # BLD AUTO: 8.5 10E9/L (ref 4–11)

## 2018-07-09 PROCEDURE — 81003 URINALYSIS AUTO W/O SCOPE: CPT | Performed by: INTERNAL MEDICINE

## 2018-07-09 PROCEDURE — 80061 LIPID PANEL: CPT | Performed by: INTERNAL MEDICINE

## 2018-07-09 PROCEDURE — 36415 COLL VENOUS BLD VENIPUNCTURE: CPT | Performed by: INTERNAL MEDICINE

## 2018-07-09 PROCEDURE — 85027 COMPLETE CBC AUTOMATED: CPT | Performed by: INTERNAL MEDICINE

## 2018-07-09 PROCEDURE — 83036 HEMOGLOBIN GLYCOSYLATED A1C: CPT | Performed by: INTERNAL MEDICINE

## 2018-07-09 PROCEDURE — 82043 UR ALBUMIN QUANTITATIVE: CPT | Performed by: INTERNAL MEDICINE

## 2018-07-09 PROCEDURE — 80053 COMPREHEN METABOLIC PANEL: CPT | Performed by: INTERNAL MEDICINE

## 2018-07-10 ENCOUNTER — OFFICE VISIT (OUTPATIENT)
Dept: INTERNAL MEDICINE | Facility: OTHER | Age: 63
End: 2018-07-10
Attending: INTERNAL MEDICINE
Payer: COMMERCIAL

## 2018-07-10 VITALS
DIASTOLIC BLOOD PRESSURE: 66 MMHG | BODY MASS INDEX: 29.04 KG/M2 | WEIGHT: 199.5 LBS | HEART RATE: 76 BPM | SYSTOLIC BLOOD PRESSURE: 118 MMHG

## 2018-07-10 DIAGNOSIS — I10 BENIGN ESSENTIAL HYPERTENSION: ICD-10-CM

## 2018-07-10 DIAGNOSIS — E78.2 MIXED HYPERLIPIDEMIA: ICD-10-CM

## 2018-07-10 DIAGNOSIS — Z90.89 S/P SUBTOTAL PARATHYROIDECTOMY: ICD-10-CM

## 2018-07-10 DIAGNOSIS — Z98.890 S/P SUBTOTAL PARATHYROIDECTOMY: ICD-10-CM

## 2018-07-10 DIAGNOSIS — E11.8 CONTROLLED TYPE 2 DIABETES MELLITUS WITH COMPLICATION, WITH LONG-TERM CURRENT USE OF INSULIN (H): Primary | ICD-10-CM

## 2018-07-10 DIAGNOSIS — Z79.4 CONTROLLED TYPE 2 DIABETES MELLITUS WITH COMPLICATION, WITH LONG-TERM CURRENT USE OF INSULIN (H): Primary | ICD-10-CM

## 2018-07-10 PROCEDURE — G0463 HOSPITAL OUTPT CLINIC VISIT: HCPCS

## 2018-07-10 PROCEDURE — 99214 OFFICE O/P EST MOD 30 MIN: CPT | Performed by: INTERNAL MEDICINE

## 2018-07-10 ASSESSMENT — ANXIETY QUESTIONNAIRES
2. NOT BEING ABLE TO STOP OR CONTROL WORRYING: NOT AT ALL
3. WORRYING TOO MUCH ABOUT DIFFERENT THINGS: NOT AT ALL
5. BEING SO RESTLESS THAT IT IS HARD TO SIT STILL: NOT AT ALL
6. BECOMING EASILY ANNOYED OR IRRITABLE: NOT AT ALL
1. FEELING NERVOUS, ANXIOUS, OR ON EDGE: NOT AT ALL
7. FEELING AFRAID AS IF SOMETHING AWFUL MIGHT HAPPEN: NOT AT ALL
GAD7 TOTAL SCORE: 0
IF YOU CHECKED OFF ANY PROBLEMS ON THIS QUESTIONNAIRE, HOW DIFFICULT HAVE THESE PROBLEMS MADE IT FOR YOU TO DO YOUR WORK, TAKE CARE OF THINGS AT HOME, OR GET ALONG WITH OTHER PEOPLE: NOT DIFFICULT AT ALL

## 2018-07-10 ASSESSMENT — ENCOUNTER SYMPTOMS
SHORTNESS OF BREATH: 0
CONFUSION: 0
ARTHRALGIAS: 0
AGITATION: 0
ABDOMINAL PAIN: 0
BRUISES/BLEEDS EASILY: 0
MYALGIAS: 0
LIGHT-HEADEDNESS: 0
HEMATURIA: 0
NAUSEA: 0
FEVER: 0
EYE PAIN: 0
VOMITING: 0
DYSURIA: 0
PALPITATIONS: 0
DIARRHEA: 0
DIZZINESS: 0
CHILLS: 0
FATIGUE: 0
COUGH: 0
WHEEZING: 0

## 2018-07-10 ASSESSMENT — PAIN SCALES - GENERAL: PAINLEVEL: SEVERE PAIN (6)

## 2018-07-10 ASSESSMENT — PATIENT HEALTH QUESTIONNAIRE - PHQ9: 5. POOR APPETITE OR OVEREATING: NOT AT ALL

## 2018-07-10 NOTE — PATIENT INSTRUCTIONS
To help with weight loss and improve blood sugar control....    -- Try to avoid Carbohydrates as much as possible -- breads, pasta, baked goods, cakes, oatmeal, cold cereal, potatoes.   These are turned to sugar in one metabolic conversion, cause insulin secretion and increased fat deposition / weight gain.      -- Eat more lean meats, proteins, eggs, nuts.       Labs are okay --- BUT BARELY!  A1c is just barely controlled.     Results for orders placed or performed in visit on 07/09/18   Albumin Random Urine Quantitative with Creat Ratio   Result Value Ref Range    Creatinine Urine 32 mg/dL    Albumin Urine mg/L 6 mg/L    Albumin Urine mg/g Cr 19.62 (H) 0 - 17 mg/g Cr   CBC with platelets   Result Value Ref Range    WBC 8.5 4.0 - 11.0 10e9/L    RBC Count 5.16 4.4 - 5.9 10e12/L    Hemoglobin 15.4 13.3 - 17.7 g/dL    Hematocrit 45.2 40.0 - 53.0 %    MCV 88 78 - 100 fl    MCH 29.8 26.5 - 33.0 pg    MCHC 34.1 31.5 - 36.5 g/dL    RDW 13.1 10.0 - 15.0 %    Platelet Count 222 150 - 450 10e9/L   Comprehensive metabolic panel   Result Value Ref Range    Sodium 137 134 - 144 mmol/L    Potassium 4.2 3.5 - 5.1 mmol/L    Chloride 104 98 - 107 mmol/L    Carbon Dioxide 29 21 - 31 mmol/L    Anion Gap 4 3 - 14 mmol/L    Glucose 149 (H) 70 - 105 mg/dL    Urea Nitrogen 13 7 - 25 mg/dL    Creatinine 0.92 0.70 - 1.30 mg/dL    GFR Estimate 83 >60 mL/min/1.7m2    GFR Estimate If Black >90 >60 mL/min/1.7m2    Calcium 9.6 8.6 - 10.3 mg/dL    Bilirubin Total 0.4 0.3 - 1.0 mg/dL    Albumin 4.3 3.5 - 5.7 g/dL    Protein Total 7.0 6.4 - 8.9 g/dL    Alkaline Phosphatase 80 34 - 104 U/L    ALT 15 7 - 52 U/L    AST 16 13 - 39 U/L   Hemoglobin A1c   Result Value Ref Range    Hemoglobin A1C 7.9 (H) 4.0 - 6.0 %   Lipid Profile   Result Value Ref Range    Cholesterol 127 <200 mg/dL    Triglycerides 70 <150 mg/dL    HDL Cholesterol 41 23 - 92 mg/dL    LDL Cholesterol Calculated 72 <100 mg/dL    Non HDL Cholesterol 86 <130 mg/dL   *UA reflex to  Microscopic   Result Value Ref Range    Color Urine Yellow     Appearance Urine Clear     Glucose Urine Negative NEG^Negative mg/dL    Bilirubin Urine Negative NEG^Negative    Ketones Urine Negative NEG^Negative mg/dL    Specific Gravity Urine 1.010 1.003 - 1.035    Blood Urine Negative NEG^Negative    pH Urine 6.5 5.0 - 7.0 pH    Protein Albumin Urine Negative NEG^Negative mg/dL    Urobilinogen Urine 0.2 0.2 - 1.0 EU/dL    Nitrite Urine Negative NEG^Negative    Leukocyte Esterase Urine Negative NEG^Negative    Source Unspecified Urine       Return for Diabetes labs and clinic follow-up appointment every 3 to 4 months.  --- (Go for about 91 to 100 days)    Aspects of Diabetes we can improve:  Hemoglobin A1c Lab Results   Component Value Date    A1C 7.9 07/09/2018    A1C 7.8 03/12/2018    Goal range is under 8. Best is 6.5 to 7   Blood Pressure 118/66 Goal to keep less than 140/90   Tobacco  reports that he has never smoked. He has never used smokeless tobacco. Goal to abstain from tobacco   Aspirin or Plavix Aspirin Aspirin or Plavix reduces risk of heart disease and stroke   ACE/ARB Losartan These medications reduce risk of kidney disease   Cholesterol Lipitor Statins reduce risk of heart disease and stroke   Eye Exam -- Do Yearly -- Annual diabetic eye exam   Healthy weight Body mass index is 29.04 kg/(m^2). Goal BMI under 30, best is under 25.      -- Trying to exercise daily (goal at least 20 min/day) with moderate aerobic activity   -- Eat healthy (resources from ADA at http://www.diabetes.org/)   -- Taking good care of my feet. Consider seeing the Podiatrist   -- Check blood sugars as directed, record in log book and bring to every appointment      Schedule lab only appointment --- A few days AFTER: 10/8/18   Schedule clinic appointment with Dr. Wilson -- Same day as labs, or 1-2 days later.     Insurance companies are now grading you and I on your blood sugar control -- Goal is to get your A1c down to 7.9%  or lower and NO Smoking!    -- Medicare and most insurance companies, will only cover Hemoglobin A1c labs to be rechecked every 91+ days.      Hemoglobin A1C   Date Value Ref Range Status   07/09/2018 7.9 (H) 4.0 - 6.0 % Final   03/12/2018 7.8 (H) 4.0 - 6.0 % Final       Next follow-up appointment with Dr. Wilson should be scheduled:  -- Approximately a few days AFTER: 10/8/18

## 2018-07-10 NOTE — NURSING NOTE
Previous A1C is at goal of <8  Lab Results   Component Value Date    A1C 7.9 07/09/2018    A1C 7.8 03/12/2018     Urine microalbumin:creatine: n/a  Foot exam this past year-declines  Eye exam this past year    Tobacco User no  Patient is on a daily aspirin  Patient is on a Statin.  Blood pressure today of:     BP Readings from Last 1 Encounters:   03/13/18 138/80      is at the goal of <139/89 for diabetics.    Patient declines any refills at this time.      Stephenie Varghese LPN 7/10/2018 2:00 PM

## 2018-07-10 NOTE — MR AVS SNAPSHOT
After Visit Summary   7/10/2018    Oh Delarosa    MRN: [id number]           Patient Information     Date Of Birth          1955        Visit Information        Provider Department      7/10/2018 2:20 PM Moshe Wilson MD [facility]        Today's Diagnoses     Controlled type 2 diabetes mellitus with complication, with long-term current use of insulin (H)    -  1    Mixed hyperlipidemia        Benign essential hypertension        S/P subtotal parathyroidectomy (H)          Care Instructions    To help with weight loss and improve blood sugar control....    -- Try to avoid Carbohydrates as much as possible -- breads, pasta, baked goods, cakes, oatmeal, cold cereal, potatoes.   These are turned to sugar in one metabolic conversion, cause insulin secretion and increased fat deposition / weight gain.      -- Eat more lean meats, proteins, eggs, nuts.       Labs are okay --- BUT BARELY!  A1c is just barely controlled.     Results for orders placed or performed in visit on 07/09/18   Albumin Random Urine Quantitative with Creat Ratio   Result Value Ref Range    Creatinine Urine 32 mg/dL    Albumin Urine mg/L 6 mg/L    Albumin Urine mg/g Cr 19.62 (H) 0 - 17 mg/g Cr   CBC with platelets   Result Value Ref Range    WBC 8.5 4.0 - 11.0 10e9/L    RBC Count 5.16 4.4 - 5.9 10e12/L    Hemoglobin 15.4 13.3 - 17.7 g/dL    Hematocrit 45.2 40.0 - 53.0 %    MCV 88 78 - 100 fl    MCH 29.8 26.5 - 33.0 pg    MCHC 34.1 31.5 - 36.5 g/dL    RDW 13.1 10.0 - 15.0 %    Platelet Count 222 150 - 450 10e9/L   Comprehensive metabolic panel   Result Value Ref Range    Sodium 137 134 - 144 mmol/L    Potassium 4.2 3.5 - 5.1 mmol/L    Chloride 104 98 - 107 mmol/L    Carbon Dioxide 29 21 - 31 mmol/L    Anion Gap 4 3 - 14 mmol/L    Glucose 149 (H) 70 - 105 mg/dL    Urea Nitrogen 13 7 - 25 mg/dL    Creatinine 0.92 0.70 - 1.30 mg/dL    GFR Estimate 83 >60 mL/min/1.7m2    GFR Estimate If Black >90 >60 mL/min/1.7m2     Calcium 9.6 8.6 - 10.3 mg/dL    Bilirubin Total 0.4 0.3 - 1.0 mg/dL    Albumin 4.3 3.5 - 5.7 g/dL    Protein Total 7.0 6.4 - 8.9 g/dL    Alkaline Phosphatase 80 34 - 104 U/L    ALT 15 7 - 52 U/L    AST 16 13 - 39 U/L   Hemoglobin A1c   Result Value Ref Range    Hemoglobin A1C 7.9 (H) 4.0 - 6.0 %   Lipid Profile   Result Value Ref Range    Cholesterol 127 <200 mg/dL    Triglycerides 70 <150 mg/dL    HDL Cholesterol 41 23 - 92 mg/dL    LDL Cholesterol Calculated 72 <100 mg/dL    Non HDL Cholesterol 86 <130 mg/dL   *UA reflex to Microscopic   Result Value Ref Range    Color Urine Yellow     Appearance Urine Clear     Glucose Urine Negative NEG^Negative mg/dL    Bilirubin Urine Negative NEG^Negative    Ketones Urine Negative NEG^Negative mg/dL    Specific Gravity Urine 1.010 1.003 - 1.035    Blood Urine Negative NEG^Negative    pH Urine 6.5 5.0 - 7.0 pH    Protein Albumin Urine Negative NEG^Negative mg/dL    Urobilinogen Urine 0.2 0.2 - 1.0 EU/dL    Nitrite Urine Negative NEG^Negative    Leukocyte Esterase Urine Negative NEG^Negative    Source Unspecified Urine       Return for Diabetes labs and clinic follow-up appointment every 3 to 4 months.  --- (Go for about 91 to 100 days)    Aspects of Diabetes we can improve:  Hemoglobin A1c Lab Results   Component Value Date    A1C 7.9 07/09/2018    A1C 7.8 03/12/2018    Goal range is under 8. Best is 6.5 to 7   Blood Pressure 118/66 Goal to keep less than 140/90   Tobacco  reports that he has never smoked. He has never used smokeless tobacco. Goal to abstain from tobacco   Aspirin or Plavix Aspirin Aspirin or Plavix reduces risk of heart disease and stroke   ACE/ARB Losartan These medications reduce risk of kidney disease   Cholesterol Lipitor Statins reduce risk of heart disease and stroke   Eye Exam -- Do Yearly -- Annual diabetic eye exam   Healthy weight Body mass index is 29.04 kg/(m^2). Goal BMI under 30, best is under 25.      -- Trying to exercise daily (goal at  least 20 min/day) with moderate aerobic activity   -- Eat healthy (resources from ADA at http://www.diabetes.org/)   -- Taking good care of my feet. Consider seeing the Podiatrist   -- Check blood sugars as directed, record in log book and bring to every appointment      Schedule lab only appointment --- A few days AFTER: 10/8/18   Schedule clinic appointment with Dr. Wilson -- Same day as labs, or 1-2 days later.     Insurance companies are now grading you and I on your blood sugar control -- Goal is to get your A1c down to 7.9% or lower and NO Smoking!    -- Medicare and most insurance companies, will only cover Hemoglobin A1c labs to be rechecked every 91+ days.      Hemoglobin A1C   Date Value Ref Range Status   07/09/2018 7.9 (H) 4.0 - 6.0 % Final   03/12/2018 7.8 (H) 4.0 - 6.0 % Final       Next follow-up appointment with Dr. Wilson should be scheduled:  -- Approximately a few days AFTER: 10/8/18             Follow-ups after your visit        Your next 10 appointments already scheduled     Oct 15, 2018  7:50 AM CDT   LAB with [facility]   [facility]    [address] [facility] [id number] [phone]           Please do not eat 10-12 hours before your appointment if you are coming in fasting for labs on lipids, cholesterol, or glucose (sugar). This does not apply to pregnant women. Water, hot tea and black coffee (with nothing added) are okay. Do not drink other fluids, diet soda or chew gum.            Oct 16, 2018  8:00 AM CDT   SHORT with Moshe Wilson MD   [facility]    [address]  [place] [id number] [phone]              Who to contact     If you have questions or need follow up information about today's clinic visit or your schedule please contact [facility] directly at [phone].  Normal or non-critical lab and imaging results will  be communicated to you by MyChart, letter or phone within 4 business days after the clinic has received the results. If you do not hear from us within 7 days, please contact the clinic through MyChart or phone. If you have a critical or abnormal lab result, we will notify you by phone as soon as possible.  Submit refill requests through [employer] or call your pharmacy and they will forward the refill request to us. Please allow 3 business days for your refill to be completed.          Additional Information About Your Visit        Care EveryWhere ID     This is your Care EveryWhere ID. This could be used by other organizations to access your [facility] [employer] [facility] records  [id number]        Your Vitals Were     Pulse BMI (Body Mass Index)                76 29.04 kg/m2           Blood Pressure from Last 3 Encounters:   07/10/18 118/66   03/13/18 138/80   12/07/17 138/72    Weight from Last 3 Encounters:   07/10/18 90.5 kg (199 lb 8 oz)   03/13/18 90.4 kg (199 lb 6 oz)   12/07/17 89.6 kg (197 lb 8 oz)              Today, you had the following     No orders found for display         Where to get your medicines      These medications were sent to [facility] - [place], [place] - [address]  [address], [facility] [address]     Phone:  [phone]     insulin glargine 100 UNIT/ML injection          Primary Care Provider Office Phone # Fax #    Moshe Wilson [phone]       [address] [facility] [address]        Equal Access to Services     WANDA BENJAMIN : Hadii armando ku reillyo Sonicole, waaxda luqadaha, qaybta kaalmada alexia, sonam pagan. So [facility] [phone].    ATENCIÓN: Si habla español, tiene a hogue disposición servicios gratuitos de asistencia lingüística. Llame al [phone].    We comply with applicable federal civil rights laws and [place] laws. We do not discriminate on the basis of race, color, national origin,  age, disability, sex, sexual orientation, or gender identity.            Thank you!     Thank you for choosing [facility] AND [facility]  for your care. Our goal is always to provide you with excellent care. Hearing back from our patients is one way we can continue to improve our services. Please take a few minutes to complete the written survey that you may receive in the mail after your visit with us. Thank you!             Your Updated Medication List - Protect others around you: Learn how to safely use, store and throw away your medicines at www.disposemymeds.org.          This list is accurate as of 7/10/18  2:23 PM.  Always use your most recent med list.                   Brand Name Dispense Instructions for use Diagnosis    atorvastatin 40 MG tablet    LIPITOR     Take 40 mg by mouth daily        CALCIUM 500 +D PO      Take 2 tablets by mouth daily        glipiZIDE 10 MG tablet    GLUCOTROL     Take 10 mg by mouth 2 times daily (before meals)        glipiZIDE-metFORMIN 2.5-250 MG per tablet    METAGLIP    90 tablet    TAKE ONE TABLET BY MOUTH EVERY DAY WITH LUNCH    Controlled type 2 diabetes mellitus with complication, without long-term current use of insulin (H)       GLUCOCOM BLOOD GLUCOSE MONITOR Luiza      Dispense glucose meter, test strips and lancets covered by the patient insurance. Test 4 times per day.        GOODSENSE ASPIRIN 325 MG tablet   Generic drug:  aspirin      Take 325 mg by mouth daily with food        insulin glargine 100 UNIT/ML injection    LANTUS SOLOSTAR    18 mL    Inject 15-25 units subcutaneously before bedtime.    Controlled type 2 diabetes mellitus with complication, with long-term current use of insulin (H)       insulin pen needle 32G X 4 MM    BD MARJORIE U/F    100 each    AS DIRECTED FOR ADMINISTERING INSULIN AT HOME. FOR USE WITH LANTUS INJECTIONS ONCE DAILY    Controlled type 2 diabetes mellitus with complication, with long-term current use of insulin (H)        losartan 50 MG tablet    COZAAR     Take 50 mg by mouth daily        metFORMIN 1000 MG tablet    GLUCOPHAGE     Take 1,000 mg by mouth 2 times daily (with meals)        vitamin D3 2000 units Caps      4000 to 5000 IU daily For Vitamin D Deficiency

## 2018-07-10 NOTE — PROGRESS NOTES
Nursing Notes:   Stephenie Varghese LPN  7/10/2018  2:10 PM  Signed  Previous A1C is at goal of <8  Lab Results   Component Value Date    A1C 7.9 07/09/2018    A1C 7.8 03/12/2018     Urine microalbumin:creatine: n/a  Foot exam this past year-declines  Eye exam this past year    Tobacco User no  Patient is on a daily aspirin  Patient is on a Statin.  Blood pressure today of:     BP Readings from Last 1 Encounters:   03/13/18 138/80      is at the goal of <139/89 for diabetics.    Patient declines any refills at this time.      Stephenie Varghese LPN 7/10/2018 2:00 PM          Nursing note reviewed with patient.  Accurracy and completeness verified.   Mr. Delarosa is a 63 year old male who:  Patient presents with:  Diabetes    HPI     ICD-10-CM    1. Controlled type 2 diabetes mellitus with complication, with long-term current use of insulin (H) E11.8 insulin glargine (LANTUS SOLOSTAR) 100 UNIT/ML pen    Z79.4    2. Mixed hyperlipidemia E78.2    3. Benign essential hypertension I10    4. S/P subtotal parathyroidectomy (H) E89.2      Type 2 diabetes, using insulin.  Needs refills.  Labs show hemoglobin A1c is controlled but did go up slightly now 7.9%.  He has been eating fresh fruit every evening before bedtime.  He did run out of insulin for almost a week as well.  Advised that we really need to cut down on carbohydrate intake.  Will start making some significant dietary changes.  Recheck labs in 3 months.    Hyperlipidemia, stable and controlled.  Doing well with Lipitor.    Hypertension, well controlled.  Tolerating medications.  Creatinine is stable.    Subtotal parathyroidectomy, calcium levels have normalized.    Functional Capacity: > 4 METS.   Reports that he can climb a flight of stairs without any chest pain/heaviness or shortness of breath.   No orthopnea/paroxysmal nocturnal dyspnea  Review of Systems   Constitutional: Negative for chills, fatigue and fever.   HENT: Negative for congestion and hearing loss.    Eyes:  Negative for pain and visual disturbance.   Respiratory: Negative for cough, shortness of breath and wheezing.    Cardiovascular: Negative for chest pain and palpitations.   Gastrointestinal: Negative for abdominal pain, diarrhea, nausea and vomiting.   Endocrine: Negative for cold intolerance and heat intolerance.   Genitourinary: Negative for dysuria and hematuria.   Musculoskeletal: Negative for arthralgias and myalgias.   Skin: Negative for pallor.   Allergic/Immunologic: Negative for immunocompromised state.   Neurological: Negative for dizziness and light-headedness.   Hematological: Does not bruise/bleed easily.   Psychiatric/Behavioral: Negative for agitation and confusion.        SHIRLENE:   SHIRLENE-7 SCORE 3/13/2018 7/10/2018   Total Score 0 0     PHQ9:  PHQ-9 SCORE 12/7/2017 3/13/2018 7/10/2018   Total Score 0 0 0       I have personally reviewed the past medical history, past surgical history, medications, allergies, family and social history as listed below, on 7/10/2018.    Allergies   Allergen Reactions     Lisinopril Cough       Current Outpatient Prescriptions   Medication Sig Dispense Refill     aspirin (GOODSENSE ASPIRIN) 325 MG tablet Take 325 mg by mouth daily with food       atorvastatin (LIPITOR) 40 MG tablet Take 40 mg by mouth daily       Blood Glucose Monitoring Suppl (GLUCOCOM BLOOD GLUCOSE MONITOR) JACKSON Dispense glucose meter, test strips and lancets covered by the patient insurance. Test 4 times per day.       Calcium Carb-Cholecalciferol (CALCIUM 500 +D PO) Take 2 tablets by mouth daily       Cholecalciferol (VITAMIN D3) 2000 UNITS CAPS 4000 to 5000 IU daily For Vitamin D Deficiency       glipiZIDE (GLUCOTROL) 10 MG tablet Take 10 mg by mouth 2 times daily (before meals)       glipiZIDE-metFORMIN (METAGLIP) 2.5-250 MG per tablet TAKE ONE TABLET BY MOUTH EVERY DAY WITH LUNCH 90 tablet 1     insulin glargine (LANTUS SOLOSTAR) 100 UNIT/ML pen Inject 15-25 units subcutaneously before bedtime. 18 mL  3     insulin pen needle (BD MARJORIE U/F) 32G X 4 MM AS DIRECTED FOR ADMINISTERING INSULIN AT HOME. FOR USE WITH LANTUS INJECTIONS ONCE DAILY 100 each 1     losartan (COZAAR) 50 MG tablet Take 50 mg by mouth daily       metFORMIN (GLUCOPHAGE) 1000 MG tablet Take 1,000 mg by mouth 2 times daily (with meals)       [DISCONTINUED] insulin glargine (LANTUS SOLOSTAR) 100 UNIT/ML pen Inject 15-25 units subcutaneously before bedtime. 20 mL 1        Patient Active Problem List    Diagnosis Date Noted     Mixed hyperlipidemia 03/06/2018     Priority: Medium     Benign essential hypertension 03/06/2018     Priority: Medium     Hereditary and idiopathic peripheral neuropathy 01/29/2018     Priority: Medium     Controlled type 2 diabetes mellitus with complication, with long-term current use of insulin (H) 10/25/2016     Priority: Medium     Chronic back pain 04/30/2015     Priority: Medium     Leiomyoma of the skin 04/29/2015     Priority: Medium     Actinic keratosis of right temple 12/17/2014     Priority: Medium     Strain of flexor muscle of right hip 12/17/2014     Priority: Medium     History of nephrolithiasis 06/24/2014     Priority: Medium     H/O hyperparathyroidism 04/10/2014     Priority: Medium     Increased PTH level 04/10/2014     Priority: Medium     S/P subtotal parathyroidectomy (H) 04/10/2014     Priority: Medium     Vitamin D deficiency 04/10/2014     Priority: Medium     Diabetic retinopathy associated with type 2 diabetes mellitus (H) 11/25/2013     Priority: Medium     Lung nodule 11/25/2013     Priority: Medium     Hematuria 07/18/2012     Priority: Medium     Past Medical History:   Diagnosis Date     Other ehrlichiosis     11/2007     Unspecified injury of left wrist, hand and finger(s), initial encounter     scaphoid bruise     Past Surgical History:   Procedure Laterality Date     OTHER SURGICAL HISTORY      01/02,YTT374,PARATHYROIDECTOMY     TONSILLECTOMY      age 5     Social History     Social  "History     Marital status:      Spouse name: N/A     Number of children: N/A     Years of education: N/A     Social History Main Topics     Smoking status: Never Smoker     Smokeless tobacco: Never Used     Alcohol use No     Drug use: No     Sexual activity: Not Asked     Other Topics Concern     None     Social History Narrative    Never a smoker.  Walks regularly,      Family History   Problem Relation Age of Onset     HEART DISEASE Father      Heart Disease, at age 73, CAD, complications of percutaneous revascularization of abdominal aortic aneurysm     Family History Negative Mother      Good Health,83     Substance Abuse Brother      Alcohol/Drug,Alcoholism       EXAM:   Vitals:    07/10/18 1357   BP: 118/66   BP Location: Right arm   Patient Position: Sitting   Cuff Size: Adult Regular   Pulse: 76   Weight: 199 lb 8 oz (90.5 kg)       Current Pain Score: Severe Pain (6)     BP Readings from Last 3 Encounters:   07/10/18 118/66   18 138/80   17 138/72    Wt Readings from Last 3 Encounters:   07/10/18 199 lb 8 oz (90.5 kg)   18 199 lb 6 oz (90.4 kg)   17 197 lb 8 oz (89.6 kg)      Estimated body mass index is 29.04 kg/(m^2) as calculated from the following:    Height as of 17: 5' 9.5\" (1.765 m).    Weight as of this encounter: 199 lb 8 oz (90.5 kg).     Physical Exam   Constitutional: He appears well-developed and well-nourished. No distress.   HENT:   Head: Normocephalic and atraumatic.   Eyes: Conjunctivae are normal. No scleral icterus.   Neck: No thyromegaly present.   Cardiovascular: Normal rate and regular rhythm.    Pulmonary/Chest: Effort normal. No respiratory distress. He has no wheezes.   Abdominal: Soft. There is no tenderness.   Musculoskeletal: He exhibits no tenderness.   Lymphadenopathy:     He has no cervical adenopathy.   Neurological: He is alert. No cranial nerve deficit.   Skin: Skin is warm and dry.   Psychiatric: He has a normal mood and " affect.        INVESTIGATIONS:  --- see below.     ASSESSMENT AND PLAN:  Problem List Items Addressed This Visit        Endocrine    Controlled type 2 diabetes mellitus with complication, with long-term current use of insulin (H) - Primary    Relevant Medications    insulin glargine (LANTUS SOLOSTAR) 100 UNIT/ML pen    Mixed hyperlipidemia       Circulatory    Benign essential hypertension       Other    S/P subtotal parathyroidectomy (H)        reviewed diet, exercise and weight control, recommended sodium restriction, cardiovascular risk and specific lipid/LDL goals reviewed, specific diabetic recommendations low cholesterol diet, weight control and daily exercise discussed and home glucose monitoring taught, technique demonstrated, use of aspirin to prevent MI and TIA's discussed  -- Expected clinical course discussed    -- Medications and their side effects discussed    Patient Instructions     To help with weight loss and improve blood sugar control....    -- Try to avoid Carbohydrates as much as possible -- breads, pasta, baked goods, cakes, oatmeal, cold cereal, potatoes.   These are turned to sugar in one metabolic conversion, cause insulin secretion and increased fat deposition / weight gain.      -- Eat more lean meats, proteins, eggs, nuts.       Labs are okay --- BUT BARELY!  A1c is just barely controlled.     Results for orders placed or performed in visit on 07/09/18   Albumin Random Urine Quantitative with Creat Ratio   Result Value Ref Range    Creatinine Urine 32 mg/dL    Albumin Urine mg/L 6 mg/L    Albumin Urine mg/g Cr 19.62 (H) 0 - 17 mg/g Cr   CBC with platelets   Result Value Ref Range    WBC 8.5 4.0 - 11.0 10e9/L    RBC Count 5.16 4.4 - 5.9 10e12/L    Hemoglobin 15.4 13.3 - 17.7 g/dL    Hematocrit 45.2 40.0 - 53.0 %    MCV 88 78 - 100 fl    MCH 29.8 26.5 - 33.0 pg    MCHC 34.1 31.5 - 36.5 g/dL    RDW 13.1 10.0 - 15.0 %    Platelet Count 222 150 - 450 10e9/L   Comprehensive metabolic panel    Result Value Ref Range    Sodium 137 134 - 144 mmol/L    Potassium 4.2 3.5 - 5.1 mmol/L    Chloride 104 98 - 107 mmol/L    Carbon Dioxide 29 21 - 31 mmol/L    Anion Gap 4 3 - 14 mmol/L    Glucose 149 (H) 70 - 105 mg/dL    Urea Nitrogen 13 7 - 25 mg/dL    Creatinine 0.92 0.70 - 1.30 mg/dL    GFR Estimate 83 >60 mL/min/1.7m2    GFR Estimate If Black >90 >60 mL/min/1.7m2    Calcium 9.6 8.6 - 10.3 mg/dL    Bilirubin Total 0.4 0.3 - 1.0 mg/dL    Albumin 4.3 3.5 - 5.7 g/dL    Protein Total 7.0 6.4 - 8.9 g/dL    Alkaline Phosphatase 80 34 - 104 U/L    ALT 15 7 - 52 U/L    AST 16 13 - 39 U/L   Hemoglobin A1c   Result Value Ref Range    Hemoglobin A1C 7.9 (H) 4.0 - 6.0 %   Lipid Profile   Result Value Ref Range    Cholesterol 127 <200 mg/dL    Triglycerides 70 <150 mg/dL    HDL Cholesterol 41 23 - 92 mg/dL    LDL Cholesterol Calculated 72 <100 mg/dL    Non HDL Cholesterol 86 <130 mg/dL   *UA reflex to Microscopic   Result Value Ref Range    Color Urine Yellow     Appearance Urine Clear     Glucose Urine Negative NEG^Negative mg/dL    Bilirubin Urine Negative NEG^Negative    Ketones Urine Negative NEG^Negative mg/dL    Specific Gravity Urine 1.010 1.003 - 1.035    Blood Urine Negative NEG^Negative    pH Urine 6.5 5.0 - 7.0 pH    Protein Albumin Urine Negative NEG^Negative mg/dL    Urobilinogen Urine 0.2 0.2 - 1.0 EU/dL    Nitrite Urine Negative NEG^Negative    Leukocyte Esterase Urine Negative NEG^Negative    Source Unspecified Urine       Return for Diabetes labs and clinic follow-up appointment every 3 to 4 months.  --- (Go for about 91 to 100 days)    Aspects of Diabetes we can improve:  Hemoglobin A1c Lab Results   Component Value Date    A1C 7.9 07/09/2018    A1C 7.8 03/12/2018    Goal range is under 8. Best is 6.5 to 7   Blood Pressure 118/66 Goal to keep less than 140/90   Tobacco  reports that he has never smoked. He has never used smokeless tobacco. Goal to abstain from tobacco   Aspirin or Plavix Aspirin Aspirin  or Plavix reduces risk of heart disease and stroke   ACE/ARB Losartan These medications reduce risk of kidney disease   Cholesterol Lipitor Statins reduce risk of heart disease and stroke   Eye Exam -- Do Yearly -- Annual diabetic eye exam   Healthy weight Body mass index is 29.04 kg/(m^2). Goal BMI under 30, best is under 25.      -- Trying to exercise daily (goal at least 20 min/day) with moderate aerobic activity   -- Eat healthy (resources from ADA at http://www.diabetes.org/)   -- Taking good care of my feet. Consider seeing the Podiatrist   -- Check blood sugars as directed, record in log book and bring to every appointment      Schedule lab only appointment --- A few days AFTER: 10/8/18   Schedule clinic appointment with Dr. Wilson -- Same day as labs, or 1-2 days later.     Insurance companies are now grading you and I on your blood sugar control -- Goal is to get your A1c down to 7.9% or lower and NO Smoking!    -- Medicare and most insurance companies, will only cover Hemoglobin A1c labs to be rechecked every 91+ days.      Hemoglobin A1C   Date Value Ref Range Status   07/09/2018 7.9 (H) 4.0 - 6.0 % Final   03/12/2018 7.8 (H) 4.0 - 6.0 % Final       Next follow-up appointment with Dr. Wilson should be scheduled:  -- Approximately a few days AFTER: 10/8/18       Moshe Wilson MD  Internal Medicine  [facility]

## 2018-07-11 ASSESSMENT — PATIENT HEALTH QUESTIONNAIRE - PHQ9: SUM OF ALL RESPONSES TO PHQ QUESTIONS 1-9: 0

## 2018-07-11 ASSESSMENT — ANXIETY QUESTIONNAIRES: GAD7 TOTAL SCORE: 0

## 2018-07-23 NOTE — PROGRESS NOTES
Patient Information     Patient Name  Oh Delarosa MRN  [id number] Sex  Male   1955      Letter by Moshe Wilson MD at      Author:  Moshe Wilson MD Service:  (none) Author Type:  (none)    Filed:   Encounter Date:  2017 Status:  (Other)           Oh Delarosa  [address] [facility] [address]          2017    Dear Mr. Delarosa:    A LIMITED refill of metFORMIN (GLUCOPHAGE) 1,000 mg tablet has been called into your pharmacy.    Additional refills require a medication management and diabetic lab appointment with Moshe Wilson MD. Please call the clinic at [phone] to schedule your appointment.    Thank you,    The Refill Nurse  [facility]

## 2018-07-24 NOTE — PROGRESS NOTES
Patient Information     Patient Name  Oh Delarosa MRN  [id number] Sex  Male   1955      Letter by Moshe Wilson MD at      Author:  Moshe Wilson MD Service:  (none) Author Type:  (none)    Filed:   Encounter Date:  2/15/2017 Status:  (Other)           Oh Delarosa  [address] [facility]  [place] [address]          February 15, 2017    Dear Mr. Delarosa:    Following are the tests completed during your last clinic visit.  The results of these tests are included below.      Results for orders placed or performed in visit on 02/15/17      CBC W PLT NO DIFF      Result  Value Ref Range    WHITE BLOOD COUNT         7.9 4.5 - 11.0 thou/cu mm    RED BLOOD COUNT           5.18 4.30 - 5.90 mil/cu mm    HEMOGLOBIN                15.4 13.5 - 17.5 g/dL    HEMATOCRIT                46.1 37.0 - 53.0 %    MCV                       89 80 - 100 fL    MCH                       29.8 26.0 - 34.0 pg    MCHC                      33.5 32.0 - 36.0 g/dL    RDW                       11.7 11.5 - 15.5 %    PLATELET COUNT            185 140 - 440 thou/cu mm    MPV                       9.3 6.5 - 11.0 fL   COMP METABOLIC PANEL      Result  Value Ref Range    SODIUM 138 133 - 143 mmol/L    POTASSIUM 4.5 3.5 - 5.1 mmol/L    CHLORIDE 105 98 - 107 mmol/L    CO2,TOTAL 27 21 - 31 mmol/L    ANION GAP 6 5 - 18                    GLUCOSE 163 (H) 70 - 105 mg/dL    CALCIUM 9.7 8.6 - 10.3 mg/dL    BUN 18 7 - 25 mg/dL    CREATININE 0.97 0.70 - 1.30 mg/dL    BUN/CREAT RATIO           19                    GFR if African American >60 >60 ml/min/1.73m2    GFR if not African American >60 >60 ml/min/1.73m2    ALBUMIN 4.3 3.5 - 5.7 g/dL    PROTEIN,TOTAL 7.5 6.4 - 8.9 g/dL    GLOBULIN                  3.2 2.0 - 3.7 g/dL    A/G RATIO 1.3 1.0 - 2.0                    BILIRUBIN,TOTAL 0.5 0.3 - 1.0 mg/dL    ALK PHOSPHATASE 69 34 - 104 IU/L    ALT (SGPT) 18 7 - 52 IU/L    AST (SGOT) 17 13 - 39 IU/L   HEMOGLOBIN A1C MONITORING (POCT)      Result  Value  Ref Range    HEMOGLOBIN A1C MONITORING (POCT) 7.7 (H) 4.0 - 6.2 %    ESTIMATED AVERAGE GLUCOSE  174 mg/dL   LIPID PANEL      Result  Value Ref Range    CHOLESTEROL,TOTAL 126 <200 mg/dL    TRIGLYCERIDES 64 <150 mg/dL    HDL CHOLESTEROL 40 23 - 92 mg/dL    NON-HDL CHOLESTEROL 86 <145 mg/dl    CHOL/HDL RATIO            3.15 <4.50                    LDL CHOLESTEROL 73 <100 mg/dL    PATIENT STATUS            FASTING                   PSA TOTAL (DIAGNOSTIC)      Result  Value Ref Range    PSA TOTAL (DIAGNOSTIC) 1.200 <=3.100 ng/mL   URINALYSIS W REFLEX MICROSCOPIC IF POSITIVE      Result  Value Ref Range    COLOR                     Yellow Yellow Color    CLARITY                   Clear Clear Clarity    SPECIFIC GRAVITY,URINE    1.010 1.010, 1.015, 1.020, 1.025                    PH,URINE                  5.5 6.0, 7.0, 8.0, 5.5, 6.5, 7.5, 8.5                    UROBILINOGEN,QUALITATIVE  Normal Normal EU/dl    PROTEIN, URINE Negative Negative mg/dL    GLUCOSE, URINE Negative Negative mg/dL    KETONES,URINE             Negative Negative mg/dL    BILIRUBIN,URINE           Negative Negative                    OCCULT BLOOD,URINE        Trace (A) Negative                    NITRITE                   Negative Negative                    LEUKOCYTE ESTERASE        Negative Negative                   URINALYSIS MICROSCOPIC      Result  Value Ref Range    RBC None Seen 0-2, None Seen /HPF    WBC None Seen 0-2, 3-5, None Seen /HPF    BACTERIA                  None Seen None Seen, Rare, Occasional, Few Bacteria/HPF    EPITHELIAL CELLS          None Seen None Seen, Few Epi/HPF         If you have any further questions or problems contact my office at  [phone]   --- or send [employer] message --- otherwise schedule an appointment.    Clinic : [phone]  Appointment line: [phone]     Thank you,    Moshe Wilson MD    Internal Medicine  [facility]     Reviewed and electronically signed by  provider.

## 2018-07-24 NOTE — PROGRESS NOTES
Patient Information     Patient Name  Oh Delarosa MRN  [id number] Sex  Male   1955      Letter by Moshe Wilson MD at      Author:  Moshe Wilson MD Service:  (none) Author Type:  (none)    Filed:   Encounter Date:  2017 Status:  (Other)           Oh Delarosa  [address] [facility]  [place] [address]          2017    Dear Mr. Delarosa:    Following are the tests completed during your last clinic visit.  The results of these tests are included below.      Hemoglobin A1c is on the high range of goal.  Ideally we would like to have this down closer to 6.0%.      Slowly increase Lantus -- every 5 to 7 days -- up to max of 25 units at bedtime..... Goal is to have your morning blood sugars average .    Results for orders placed or performed in visit on 17      Hgb A1c      Result  Value Ref Range    HEMOGLOBIN A1C MONITORING (POCT) 7.8 (H) 4.0 - 6.2 %    ESTIMATED AVERAGE GLUCOSE  177 mg/dL   COMPLETE METABOLIC PANEL      Result  Value Ref Range    SODIUM 140 133 - 143 mmol/L    POTASSIUM 4.6 3.5 - 5.1 mmol/L    CHLORIDE 101 98 - 107 mmol/L    CO2,TOTAL 25 21 - 31 mmol/L    ANION GAP 14 5 - 18                    GLUCOSE 210 (H) 70 - 105 mg/dL    CALCIUM 9.7 8.6 - 10.3 mg/dL    BUN 13 7 - 25 mg/dL    CREATININE 0.98 0.70 - 1.30 mg/dL    BUN/CREAT RATIO           13                    GFR if African American >60 >60 ml/min/1.73m2    GFR if not African American >60 >60 ml/min/1.73m2    ALBUMIN 4.2 3.5 - 5.7 g/dL    PROTEIN,TOTAL 7.3 6.4 - 8.9 g/dL    GLOBULIN                  3.1 2.0 - 3.7 g/dL    A/G RATIO 1.4 1.0 - 2.0                    BILIRUBIN,TOTAL 0.6 0.3 - 1.0 mg/dL    ALK PHOSPHATASE 81 34 - 104 IU/L    ALT (SGPT) 17 7 - 52 IU/L    AST (SGOT) 19 13 - 39 IU/L   CBC WITH AUTO DIFFERENTIAL      Result  Value Ref Range    WHITE BLOOD COUNT         7.4 4.5 - 11.0 thou/cu mm    RED BLOOD COUNT           5.08 4.30 - 5.90 mil/cu mm    HEMOGLOBIN                15.0 13.5 - 17.5  g/dL    HEMATOCRIT                44.8 37.0 - 53.0 %    MCV                       88 80 - 100 fL    MCH                       29.5 26.0 - 34.0 pg    MCHC                      33.5 32.0 - 36.0 g/dL    RDW                       13.3 11.5 - 15.5 %    PLATELET COUNT            218 140 - 440 thou/cu mm    MPV                       11.6 (H) 6.5 - 11.0 fL    NEUTROPHILS               48.1 42.0 - 72.0 %    LYMPHOCYTES               40.6 20.0 - 44.0 %    MONOCYTES                 7.7 <12.0 %    EOSINOPHILS               2.7 <8.0 %    BASOPHILS                 0.8 <3.0 %    IMMATURE GRANULOCYTES(METAS,MYELOS,PROS) 0.1 %    ABSOLUTE NEUTROPHILS      3.6 1.7 - 7.0 thou/cu mm    ABSOLUTE LYMPHOCYTES      3.0 (H) 0.9 - 2.9 thou/cu mm    ABSOLUTE MONOCYTES        0.6 <0.9 thou/cu mm    ABSOLUTE EOSINOPHILS      0.2 <0.5 thou/cu mm    ABSOLUTE BASOPHILS        0.1 <0.3 thou/cu mm    ABSOLUTE IMMATURE GRANULOCYTES(METAS,MYELOS,PROS) 0.0 <=0.3 thou/cu mm   LIPID PANEL      Result  Value Ref Range    CHOLESTEROL,TOTAL 131 <200 mg/dL    TRIGLYCERIDES 115 <150 mg/dL    HDL CHOLESTEROL 40 23 - 92 mg/dL    NON-HDL CHOLESTEROL 91 <145 mg/dl    CHOL/HDL RATIO            3.28 <4.50                    LDL CHOLESTEROL 68 <100 mg/dL    PATIENT STATUS            NON-FASTING                         If you have any further questions or problems contact my office at  [phone]   --- or send [employer] message --- otherwise schedule an appointment.    Clinic : [phone]  Appointment line: [phone]     Thank you,    Moshe Wilson MD    Internal Medicine  [facility]     Reviewed and electronically signed by provider.

## 2018-08-09 RX ORDER — ATORVASTATIN CALCIUM 40 MG/1
TABLET, FILM COATED ORAL
Qty: 90 TABLET | OUTPATIENT
Start: 2018-08-09

## 2018-08-29 NOTE — PATIENT INSTRUCTIONS
Detail Level: Zone Patient Information     Patient Name MRN Sex Oh Mcneal [id number] Male 1955      Patient Instructions by Moshe Wilson MD at 2017  8:00 AM     Author:  Moshe Wilson MD  Service:  (none) Author Type:  Physician     Filed:  2017  8:12 AM  Encounter Date:  2017 Status:  Addendum     :  Moshe Wilson MD (Physician)        Related Notes: Original Note by Moshe Wilson MD (Physician) filed at 2017  8:10 AM            Labs are looking better....       -- Try to avoid Carbohydrates as much as possible -- breads, pasta, baked goods, cakes, oatmeal, cold cereal, potatoes.   These are turned to sugar in one metabolic conversion, cause insulin secretion and increased fat deposition / weight gain.      -- Eat more lean meats, proteins, eggs, nuts.       Results for orders placed or performed in visit on 17      CBC W PLT NO DIFF      Result  Value Ref Range    WHITE BLOOD COUNT         7.7 4.5 - 11.0 thou/cu mm    RED BLOOD COUNT           5.29 4.30 - 5.90 mil/cu mm    HEMOGLOBIN                16.0 13.5 - 17.5 g/dL    HEMATOCRIT                46.7 37.0 - 53.0 %    MCV                       88 80 - 100 fL    MCH                       30.2 26.0 - 34.0 pg    MCHC                      34.3 32.0 - 36.0 g/dL    RDW                       13.0 11.5 - 15.5 %    PLATELET COUNT            231 140 - 440 thou/cu mm    MPV                       10.4 6.5 - 11.0 fL   COMP METABOLIC PANEL      Result  Value Ref Range    SODIUM 137 133 - 143 mmol/L    POTASSIUM 4.5 3.5 - 5.1 mmol/L    CHLORIDE 104 98 - 107 mmol/L    CO2,TOTAL 27 21 - 31 mmol/L    ANION GAP 6 5 - 18                    GLUCOSE 119 (H) 70 - 105 mg/dL    CALCIUM 9.1 8.6 - 10.3 mg/dL    BUN 16 7 - 25 mg/dL    CREATININE 1.01 0.70 - 1.30 mg/dL    BUN/CREAT RATIO           16                    GFR if African American >60 >60 ml/min/1.73m2    GFR if not African American >60 >60 ml/min/1.73m2    ALBUMIN 4.2 3.5 - 5.7  Detail Level: Simple g/dL    PROTEIN,TOTAL 7.4 6.4 - 8.9 g/dL    GLOBULIN                  3.2 2.0 - 3.7 g/dL    A/G RATIO 1.3 1.0 - 2.0                    BILIRUBIN,TOTAL 0.5 0.3 - 1.0 mg/dL    ALK PHOSPHATASE 75 34 - 104 IU/L    ALT (SGPT) 22 7 - 52 IU/L    AST (SGOT) 22 13 - 39 IU/L   HEMOGLOBIN A1C MONITORING (POCT)      Result  Value Ref Range    HEMOGLOBIN A1C MONITORING (POCT) 7.2 (H) 4.0 - 6.2 %    ESTIMATED AVERAGE GLUCOSE  160 mg/dL   LIPID PANEL      Result  Value Ref Range    CHOLESTEROL,TOTAL 114 <200 mg/dL    TRIGLYCERIDES 74 <150 mg/dL    HDL CHOLESTEROL 36 23 - 92 mg/dL    NON-HDL CHOLESTEROL 78 <145 mg/dl    CHOL/HDL RATIO            3.17 <4.50                    LDL CHOLESTEROL 63 <100 mg/dL    PROVIDER ORDERED STATUS RANDOM         Return for Diabetes labs and clinic follow-up appointment every 3 to 4 months.  --- (Go for about 91 to 100 days)    Schedule lab only appointment --- A few days AFTER: 03/07/18     Schedule clinic appointment with Dr. Wilson -- Same day as labs, or 1-2 days later.     Insurance companies are now grading you and I on your blood sugar control -- Goal is to get your A1c down to 7.9% or lower and NO Smoking!    -- Medicare and most insurance companies, will only cover Hemoglobin A1c labs to be rechecked every 91+ days.      HEMOGLOBIN A1C MONITORING (POCT)    Date Value   12/06/2017 7.2 % (H)   04/10/2013 6.5 % NGSP (H)     HEMOGLOBIN A1C SCREENING (% Total Hgb)    Date Value   04/10/2014 6.9 (H)        Next follow-up appointment with Dr. Wilson should be scheduled:  -- Approximately a few days AFTER: 03/07/18           Quality 137: Melanoma: Continuity Of Care - Recall System: Patient information entered into a recall system that includes: target date for the next exam specified AND a process to follow up with patients regarding missed or unscheduled appointments Quality 224: Stage 0-Iic Melanoma: Overutilization Of Imaging Studies For Only Stage 0-Iic Melanoma: None of the following diagnostic imaging studies ordered: chest X-ray, CT, Ultrasound, MRI, PET, or nuclear medicine scans (ML) Detail Level: Generalized Include Location In Plan?: No

## 2018-10-03 ENCOUNTER — TELEPHONE (OUTPATIENT)
Dept: INTERNAL MEDICINE | Facility: OTHER | Age: 63
End: 2018-10-03

## 2018-10-03 DIAGNOSIS — E11.8 CONTROLLED TYPE 2 DIABETES MELLITUS WITH COMPLICATION, WITH LONG-TERM CURRENT USE OF INSULIN (H): Primary | ICD-10-CM

## 2018-10-03 DIAGNOSIS — Z79.4 CONTROLLED TYPE 2 DIABETES MELLITUS WITH COMPLICATION, WITH LONG-TERM CURRENT USE OF INSULIN (H): Primary | ICD-10-CM

## 2018-10-03 NOTE — TELEPHONE ENCOUNTER
Incoming fax needing a refill on True Metrix Glucose Test Strip.  Next office visit with Moshe Wilson MD 10-15-18.      Refill pending.      Stephenie Varghese LPN 10/3/2018 9:13 AM

## 2018-10-04 DIAGNOSIS — E11.8 CONTROLLED TYPE 2 DIABETES MELLITUS WITH COMPLICATION, WITH LONG-TERM CURRENT USE OF INSULIN (H): ICD-10-CM

## 2018-10-04 DIAGNOSIS — Z79.4 CONTROLLED TYPE 2 DIABETES MELLITUS WITH COMPLICATION, WITH LONG-TERM CURRENT USE OF INSULIN (H): ICD-10-CM

## 2018-10-04 NOTE — TELEPHONE ENCOUNTER
Prescription approved per [facility] Refill Protocol.  Meena Reynolds RN on 10/4/2018 at 11:20 AM

## 2018-10-15 ENCOUNTER — OFFICE VISIT (OUTPATIENT)
Dept: INTERNAL MEDICINE | Facility: OTHER | Age: 63
End: 2018-10-15
Attending: INTERNAL MEDICINE
Payer: MEDICARE

## 2018-10-15 VITALS
DIASTOLIC BLOOD PRESSURE: 88 MMHG | BODY MASS INDEX: 28.98 KG/M2 | HEART RATE: 68 BPM | WEIGHT: 199.13 LBS | SYSTOLIC BLOOD PRESSURE: 134 MMHG

## 2018-10-15 DIAGNOSIS — N18.2 CKD (CHRONIC KIDNEY DISEASE) STAGE 2, GFR 60-89 ML/MIN: ICD-10-CM

## 2018-10-15 DIAGNOSIS — Z79.4 CONTROLLED TYPE 2 DIABETES MELLITUS WITH COMPLICATION, WITH LONG-TERM CURRENT USE OF INSULIN (H): Primary | ICD-10-CM

## 2018-10-15 DIAGNOSIS — Z79.4 CONTROLLED TYPE 2 DIABETES MELLITUS WITH COMPLICATION, WITH LONG-TERM CURRENT USE OF INSULIN (H): ICD-10-CM

## 2018-10-15 DIAGNOSIS — E11.8 CONTROLLED TYPE 2 DIABETES MELLITUS WITH COMPLICATION, WITH LONG-TERM CURRENT USE OF INSULIN (H): Primary | ICD-10-CM

## 2018-10-15 DIAGNOSIS — E78.2 MIXED HYPERLIPIDEMIA: ICD-10-CM

## 2018-10-15 DIAGNOSIS — I10 BENIGN ESSENTIAL HYPERTENSION: ICD-10-CM

## 2018-10-15 DIAGNOSIS — G60.9 HEREDITARY AND IDIOPATHIC PERIPHERAL NEUROPATHY: ICD-10-CM

## 2018-10-15 DIAGNOSIS — E11.8 CONTROLLED TYPE 2 DIABETES MELLITUS WITH COMPLICATION, WITH LONG-TERM CURRENT USE OF INSULIN (H): ICD-10-CM

## 2018-10-15 LAB
ALBUMIN SERPL-MCNC: 4.3 G/DL (ref 3.5–5.7)
ALBUMIN UR-MCNC: NEGATIVE MG/DL
ALP SERPL-CCNC: 72 U/L (ref 34–104)
ALT SERPL W P-5'-P-CCNC: 18 U/L (ref 7–52)
ANION GAP SERPL CALCULATED.3IONS-SCNC: 5 MMOL/L (ref 3–14)
APPEARANCE UR: CLEAR
AST SERPL W P-5'-P-CCNC: 18 U/L (ref 13–39)
BILIRUB SERPL-MCNC: 0.6 MG/DL (ref 0.3–1)
BILIRUB UR QL STRIP: NEGATIVE
BUN SERPL-MCNC: 19 MG/DL (ref 7–25)
CALCIUM SERPL-MCNC: 9.6 MG/DL (ref 8.6–10.3)
CHLORIDE SERPL-SCNC: 102 MMOL/L (ref 98–107)
CHOLEST SERPL-MCNC: 121 MG/DL
CO2 SERPL-SCNC: 28 MMOL/L (ref 21–31)
COLOR UR AUTO: YELLOW
CREAT SERPL-MCNC: 0.93 MG/DL (ref 0.7–1.3)
CREAT UR-MCNC: 55 MG/DL
ERYTHROCYTE [DISTWIDTH] IN BLOOD BY AUTOMATED COUNT: 13.2 % (ref 10–15)
GFR SERPL CREATININE-BSD FRML MDRD: 82 ML/MIN/1.7M2
GLUCOSE SERPL-MCNC: 122 MG/DL (ref 70–105)
GLUCOSE UR STRIP-MCNC: NEGATIVE MG/DL
HBA1C MFR BLD: 7.5 % (ref 4–6)
HCT VFR BLD AUTO: 45.4 % (ref 40–53)
HDLC SERPL-MCNC: 42 MG/DL (ref 23–92)
HGB BLD-MCNC: 15.4 G/DL (ref 13.3–17.7)
HGB UR QL STRIP: NEGATIVE
KETONES UR STRIP-MCNC: NEGATIVE MG/DL
LDLC SERPL CALC-MCNC: 66 MG/DL
LEUKOCYTE ESTERASE UR QL STRIP: NEGATIVE
MCH RBC QN AUTO: 29.5 PG (ref 26.5–33)
MCHC RBC AUTO-ENTMCNC: 33.9 G/DL (ref 31.5–36.5)
MCV RBC AUTO: 87 FL (ref 78–100)
MICROALBUMIN UR-MCNC: 9 MG/L
MICROALBUMIN/CREAT UR: 15.49 MG/G CR (ref 0–17)
NITRATE UR QL: NEGATIVE
NONHDLC SERPL-MCNC: 79 MG/DL
PH UR STRIP: 6 PH (ref 5–9)
PLATELET # BLD AUTO: 251 10E9/L (ref 150–450)
POTASSIUM SERPL-SCNC: 4.4 MMOL/L (ref 3.5–5.1)
PROT SERPL-MCNC: 7.4 G/DL (ref 6.4–8.9)
RBC # BLD AUTO: 5.22 10E12/L (ref 4.4–5.9)
SODIUM SERPL-SCNC: 135 MMOL/L (ref 134–144)
SOURCE: NORMAL
SP GR UR STRIP: 1.01 (ref 1–1.03)
TRIGL SERPL-MCNC: 66 MG/DL
UROBILINOGEN UR STRIP-ACNC: 0.2 EU/DL (ref 0.2–1)
WBC # BLD AUTO: 7.7 10E9/L (ref 4–11)

## 2018-10-15 PROCEDURE — 80053 COMPREHEN METABOLIC PANEL: CPT | Performed by: INTERNAL MEDICINE

## 2018-10-15 PROCEDURE — 82043 UR ALBUMIN QUANTITATIVE: CPT | Performed by: INTERNAL MEDICINE

## 2018-10-15 PROCEDURE — 85027 COMPLETE CBC AUTOMATED: CPT | Performed by: INTERNAL MEDICINE

## 2018-10-15 PROCEDURE — 81003 URINALYSIS AUTO W/O SCOPE: CPT | Performed by: INTERNAL MEDICINE

## 2018-10-15 PROCEDURE — 83036 HEMOGLOBIN GLYCOSYLATED A1C: CPT | Performed by: INTERNAL MEDICINE

## 2018-10-15 PROCEDURE — 99214 OFFICE O/P EST MOD 30 MIN: CPT | Performed by: INTERNAL MEDICINE

## 2018-10-15 PROCEDURE — 80061 LIPID PANEL: CPT | Performed by: INTERNAL MEDICINE

## 2018-10-15 PROCEDURE — G0463 HOSPITAL OUTPT CLINIC VISIT: HCPCS

## 2018-10-15 PROCEDURE — 36415 COLL VENOUS BLD VENIPUNCTURE: CPT | Performed by: INTERNAL MEDICINE

## 2018-10-15 RX ORDER — GLIPIZIDE 10 MG/1
10 TABLET ORAL
Qty: 180 TABLET | Refills: 3 | Status: SHIPPED | OUTPATIENT
Start: 2018-10-15 | End: 2019-07-30

## 2018-10-15 RX ORDER — GLIPIZIDE AND METFORMIN HCL 2.5; 25 MG/1; MG/1
1 TABLET, FILM COATED ORAL DAILY
Qty: 90 TABLET | Refills: 3 | Status: SHIPPED | OUTPATIENT
Start: 2018-10-15 | End: 2019-07-30

## 2018-10-15 RX ORDER — ATORVASTATIN CALCIUM 40 MG/1
40 TABLET, FILM COATED ORAL DAILY
Qty: 90 TABLET | Refills: 3 | Status: SHIPPED | OUTPATIENT
Start: 2018-10-15 | End: 2019-07-30

## 2018-10-15 RX ORDER — LOSARTAN POTASSIUM 50 MG/1
50 TABLET ORAL DAILY
Qty: 90 TABLET | Refills: 3 | Status: SHIPPED | OUTPATIENT
Start: 2018-10-15 | End: 2019-07-30

## 2018-10-15 ASSESSMENT — ENCOUNTER SYMPTOMS
EYE PAIN: 0
WHEEZING: 0
NAUSEA: 0
CHILLS: 0
COUGH: 0
ARTHRALGIAS: 0
DYSURIA: 0
PALPITATIONS: 0
CONFUSION: 0
ABDOMINAL PAIN: 0
VOMITING: 0
HEMATURIA: 0
LIGHT-HEADEDNESS: 0
FEVER: 0
SHORTNESS OF BREATH: 0
DIARRHEA: 0
BRUISES/BLEEDS EASILY: 0
NUMBNESS: 1
FATIGUE: 0
DIZZINESS: 0
MYALGIAS: 0
AGITATION: 0

## 2018-10-15 ASSESSMENT — ANXIETY QUESTIONNAIRES
3. WORRYING TOO MUCH ABOUT DIFFERENT THINGS: NOT AT ALL
6. BECOMING EASILY ANNOYED OR IRRITABLE: NOT AT ALL
1. FEELING NERVOUS, ANXIOUS, OR ON EDGE: NOT AT ALL
GAD7 TOTAL SCORE: 0
7. FEELING AFRAID AS IF SOMETHING AWFUL MIGHT HAPPEN: NOT AT ALL
IF YOU CHECKED OFF ANY PROBLEMS ON THIS QUESTIONNAIRE, HOW DIFFICULT HAVE THESE PROBLEMS MADE IT FOR YOU TO DO YOUR WORK, TAKE CARE OF THINGS AT HOME, OR GET ALONG WITH OTHER PEOPLE: NOT DIFFICULT AT ALL
5. BEING SO RESTLESS THAT IT IS HARD TO SIT STILL: NOT AT ALL
2. NOT BEING ABLE TO STOP OR CONTROL WORRYING: NOT AT ALL

## 2018-10-15 ASSESSMENT — PATIENT HEALTH QUESTIONNAIRE - PHQ9: 5. POOR APPETITE OR OVEREATING: NOT AT ALL

## 2018-10-15 ASSESSMENT — PAIN SCALES - GENERAL: PAINLEVEL: EXTREME PAIN (8)

## 2018-10-15 NOTE — NURSING NOTE
Patient presents to the clinic for diabetes management.    Previous A1C is at goal of <8  Lab Results   Component Value Date    A1C 7.5 10/15/2018    A1C 7.9 07/09/2018    A1C 7.8 03/12/2018     Urine microalbumin:creatine: n/a  Foot exam this past year-declines at this time  Eye exam 03/2018    Tobacco User no  Patient is on a daily aspirin  Patient is on a Statin.  Blood pressure today of:     BP Readings from Last 1 Encounters:   07/10/18 118/66      is at the goal of <139/89 for diabetics.    Stephenie Varghese LPN on 10/15/2018 at 10:20 AM

## 2018-10-15 NOTE — MR AVS SNAPSHOT
After Visit Summary   10/15/2018    Oh Delarosa    MRN: [id number]           Patient Information     Date Of Birth          1955        Visit Information        Provider Department      10/15/2018 10:40 AM Moshe Wilson MD [facility]        Today's Diagnoses     Controlled type 2 diabetes mellitus with complication, without long-term current use of insulin (H)    -  1    Mixed hyperlipidemia        Benign essential hypertension        CKD (chronic kidney disease) stage 2, GFR 60-89 ml/min          Care Instructions    Labs are better today.    Medications refilled.     Blood pressure is well controlled.    Hemoglobin A1c/diabetes lab has improved.    Kidney function is unchanged over the past 3 months, based on your estimated GFR, kidney function is in the stage II CKD range.  -- Avoid NSAIDS like Ibuprofen/Advil, Naproxen/Aleve -- as much as possible.     Results for orders placed or performed in visit on 10/15/18   CBC with platelets   Result Value Ref Range    WBC 7.7 4.0 - 11.0 10e9/L    RBC Count 5.22 4.4 - 5.9 10e12/L    Hemoglobin 15.4 13.3 - 17.7 g/dL    Hematocrit 45.4 40.0 - 53.0 %    MCV 87 78 - 100 fl    MCH 29.5 26.5 - 33.0 pg    MCHC 33.9 31.5 - 36.5 g/dL    RDW 13.2 10.0 - 15.0 %    Platelet Count 251 150 - 450 10e9/L   Comprehensive metabolic panel   Result Value Ref Range    Sodium 135 134 - 144 mmol/L    Potassium 4.4 3.5 - 5.1 mmol/L    Chloride 102 98 - 107 mmol/L    Carbon Dioxide 28 21 - 31 mmol/L    Anion Gap 5 3 - 14 mmol/L    Glucose 122 (H) 70 - 105 mg/dL    Urea Nitrogen 19 7 - 25 mg/dL    Creatinine 0.93 0.70 - 1.30 mg/dL    GFR Estimate 82 >60 mL/min/1.7m2    GFR Estimate If Black >90 >60 mL/min/1.7m2    Calcium 9.6 8.6 - 10.3 mg/dL    Bilirubin Total 0.6 0.3 - 1.0 mg/dL    Albumin 4.3 3.5 - 5.7 g/dL    Protein Total 7.4 6.4 - 8.9 g/dL    Alkaline Phosphatase 72 34 - 104 U/L    ALT 18 7 - 52 U/L    AST 18 13 - 39 U/L   Hemoglobin A1c   Result  Value Ref Range    Hemoglobin A1C 7.5 (H) 4.0 - 6.0 %   Lipid Profile   Result Value Ref Range    Cholesterol 121 <200 mg/dL    Triglycerides 66 <150 mg/dL    HDL Cholesterol 42 23 - 92 mg/dL    LDL Cholesterol Calculated 66 <100 mg/dL    Non HDL Cholesterol 79 <130 mg/dL   *UA reflex to Microscopic   Result Value Ref Range    Color Urine Yellow     Appearance Urine Clear     Glucose Urine Negative NEG^Negative mg/dL    Bilirubin Urine Negative NEG^Negative    Ketones Urine Negative NEG^Negative mg/dL    Specific Gravity Urine 1.010 1.000 - 1.030    Blood Urine Negative NEG^Negative    pH Urine 6.0 5.0 - 9.0 pH    Protein Albumin Urine Negative NEG^Negative mg/dL    Urobilinogen Urine 0.2 0.2 - 1.0 EU/dL    Nitrite Urine Negative NEG^Negative    Leukocyte Esterase Urine Negative NEG^Negative    Source Midstream Urine       Return for Diabetes labs and clinic follow-up appointment every 3 to 4 months.  --- (Go for about 91 to 100 days)    Aspects of Diabetes we can improve:  Hemoglobin A1c Lab Results   Component Value Date    A1C 7.5 10/15/2018    A1C 7.9 07/09/2018    A1C 7.8 03/12/2018    Goal range is under 8. Best is 6.5 to 7   Blood Pressure 134/88 Goal to keep less than 140/90   Tobacco  reports that he has never smoked. He has never used smokeless tobacco. Goal to abstain from tobacco   Aspirin or Plavix  aspirin Aspirin or Plavix reduces risk of heart disease and stroke   ACE/ARB  losartan These medications reduce risk of kidney disease   Cholesterol  Lipitor Statins reduce risk of heart disease and stroke   Eye Exam -- Do Yearly -- Annual diabetic eye exam   Healthy weight Body mass index is 28.98 kg/(m^2). Goal BMI under 30, best is under 25.      -- Trying to exercise daily (goal at least 20 min/day) with moderate aerobic activity   -- Eat healthy (resources from ADA at http://www.diabetes.org/)   -- Taking good care of my feet. Consider seeing the Podiatrist   -- Check blood sugars as directed, record in  log book and bring to every appointment      Schedule lab only appointment --- A few days AFTER: 1/13/19   Schedule clinic appointment with Dr. Wilson -- Same day as labs, or 1-2 days later.     Insurance companies are now grading you and I on your blood sugar control -- Goal is to get your A1c down to 7.9% or lower and NO Smoking!    -- Medicare and most insurance companies, will only cover Hemoglobin A1c labs to be rechecked every 91+ days.      Hemoglobin A1C   Date Value Ref Range Status   10/15/2018 7.5 (H) 4.0 - 6.0 % Final   07/09/2018 7.9 (H) 4.0 - 6.0 % Final       Next follow-up appointment with Dr. Wilson should be scheduled:  -- Approximately a few days AFTER: 1/13/19             Follow-ups after your visit        Your next 10 appointments already scheduled     Oct 15, 2018 10:40 AM CDT   Office Visit with Moshe Wilson MD   [facility]    [address]   [facility] [id number] [phone]           Bring a current list of meds and any records pertaining to this visit. For Physicals, please bring immunization records and any forms needing to be filled out. Please arrive 10 minutes early to complete paperwork.              Who to contact     If you have questions or need follow up information about today's clinic visit or your schedule please contact [facility] directly at [phone].  Normal or non-critical lab and imaging results will be communicated to you by MyChart, letter or phone within 4 business days after the clinic has received the results. If you do not hear from us within 7 days, please contact the clinic through [employer] or phone. If you have a critical or abnormal lab result, we will notify you by phone as soon as possible.  Submit refill requests through [employer] or call your pharmacy and they will forward the refill request to us. Please allow 3 business days for your refill to be completed.           Additional Information About Your Visit        Care EveryWhere ID     This is your Care EveryWhere ID. This could be used by other organizations to access your [facility] medical records  [id number]        Your Vitals Were     Pulse BMI (Body Mass Index)                68 28.98 kg/m2           Blood Pressure from Last 3 Encounters:   10/15/18 134/88   07/10/18 118/66   03/13/18 138/80    Weight from Last 3 Encounters:   10/15/18 199 lb 2 oz (90.3 kg)   07/10/18 199 lb 8 oz (90.5 kg)   03/13/18 199 lb 6 oz (90.4 kg)              Today, you had the following     No orders found for display         Today's Medication Changes          These changes are accurate as of 10/15/18 10:38 AM.  If you have any questions, ask your nurse or doctor.               These medicines have changed or have updated prescriptions.        Dose/Directions    glipiZIDE-metFORMIN 2.5-250 MG per tablet   Commonly known as:  METAGLIP   This may have changed:  See the new instructions.   Used for:  Controlled type 2 diabetes mellitus with complication, without long-term current use of insulin (H)   Changed by:  Moshe Wilson MD        Dose:  1 tablet   Take 1 tablet by mouth daily   Quantity:  90 tablet   Refills:  3            Where to get your medicines      These medications were sent to [id number] - [place], [place] - [address]  [address] [facility] [address]     Phone:  [phone]     atorvastatin 40 MG tablet    glipiZIDE 10 MG tablet    glipiZIDE-metFORMIN 2.5-250 MG per tablet    losartan 50 MG tablet    metFORMIN 1000 MG tablet                Primary Care Provider Office Phone # Fax #    Moshe Wilson [phone] [address]  [facility] [address]        Equal Access to Services     MARY BENJAMIN : Baylee Gannon, aleks german, qajimena kasonam hansen. So [facility] [phone].    ATENCIÓN: Si  jan fish, tiene a hogue disposición servicios gratuitos de asistencia lingüística. Nidhi brown [phone].    We comply with applicable federal civil rights laws and [place] laws. We do not discriminate on the basis of race, color, national origin, age, disability, sex, sexual orientation, or gender identity.            Thank you!     Thank you for choosing [facility] AND [facility]  for your care. Our goal is always to provide you with excellent care. Hearing back from our patients is one way we can continue to improve our services. Please take a few minutes to complete the written survey that you may receive in the mail after your visit with us. Thank you!             Your Updated Medication List - Protect others around you: Learn how to safely use, store and throw away your medicines at www.disposemymeds.org.          This list is accurate as of 10/15/18 10:38 AM.  Always use your most recent med list.                   Brand Name Dispense Instructions for use Diagnosis    atorvastatin 40 MG tablet    LIPITOR    90 tablet    Take 1 tablet (40 mg) by mouth daily    Mixed hyperlipidemia       blood glucose monitoring test strip    no brand specified    400 each    1 strip by In Vitro route 4 times daily Use to test blood sugars 4 times daily or as directed-okay to substitute for insurance coverage    Controlled type 2 diabetes mellitus with complication, with long-term current use of insulin (H)       CALCIUM 500 +D PO      Take 2 tablets by mouth daily        glipiZIDE 10 MG tablet    GLUCOTROL    180 tablet    Take 1 tablet (10 mg) by mouth 2 times daily (before meals)    Controlled type 2 diabetes mellitus with complication, without long-term current use of insulin (H)       glipiZIDE-metFORMIN 2.5-250 MG per tablet    METAGLIP    90 tablet    Take 1 tablet by mouth daily    Controlled type 2 diabetes mellitus with complication, without long-term current use of insulin (H)       GLUCOCOM BLOOD GLUCOSE  MONITOR Luiza      Dispense glucose meter, test strips and lancets covered by the patient insurance. Test 4 times per day.        GOODSENSE ASPIRIN 325 MG tablet   Generic drug:  aspirin      Take 325 mg by mouth daily with food        insulin glargine 100 UNIT/ML injection    LANTUS SOLOSTAR    18 mL    Inject 15-25 units subcutaneously before bedtime.    Controlled type 2 diabetes mellitus with complication, with long-term current use of insulin (H)       insulin pen needle 32G X 4 MM    BD MARJORIE U/F    100 each    AS DIRECTED FOR ADMINISTERING INSULIN AT HOME. FOR USE WITH LANTUS INJECTIONS ONCE DAILY    Controlled type 2 diabetes mellitus with complication, with long-term current use of insulin (H)       losartan 50 MG tablet    COZAAR    90 tablet    Take 1 tablet (50 mg) by mouth daily    Benign essential hypertension       metFORMIN 1000 MG tablet    GLUCOPHAGE    180 tablet    Take 1 tablet (1,000 mg) by mouth 2 times daily (with meals)    Controlled type 2 diabetes mellitus with complication, without long-term current use of insulin (H)       vitamin D3 2000 units Caps      4000 to 5000 IU daily For Vitamin D Deficiency

## 2018-10-15 NOTE — PATIENT INSTRUCTIONS
Labs are better today.    Medications refilled.     Blood pressure is well controlled.    Hemoglobin A1c/diabetes lab has improved.    Kidney function is unchanged over the past 3 months, based on your estimated GFR, kidney function is in the stage II CKD range.  -- Avoid NSAIDS like Ibuprofen/Advil, Naproxen/Aleve -- as much as possible.     Results for orders placed or performed in visit on 10/15/18   CBC with platelets   Result Value Ref Range    WBC 7.7 4.0 - 11.0 10e9/L    RBC Count 5.22 4.4 - 5.9 10e12/L    Hemoglobin 15.4 13.3 - 17.7 g/dL    Hematocrit 45.4 40.0 - 53.0 %    MCV 87 78 - 100 fl    MCH 29.5 26.5 - 33.0 pg    MCHC 33.9 31.5 - 36.5 g/dL    RDW 13.2 10.0 - 15.0 %    Platelet Count 251 150 - 450 10e9/L   Comprehensive metabolic panel   Result Value Ref Range    Sodium 135 134 - 144 mmol/L    Potassium 4.4 3.5 - 5.1 mmol/L    Chloride 102 98 - 107 mmol/L    Carbon Dioxide 28 21 - 31 mmol/L    Anion Gap 5 3 - 14 mmol/L    Glucose 122 (H) 70 - 105 mg/dL    Urea Nitrogen 19 7 - 25 mg/dL    Creatinine 0.93 0.70 - 1.30 mg/dL    GFR Estimate 82 >60 mL/min/1.7m2    GFR Estimate If Black >90 >60 mL/min/1.7m2    Calcium 9.6 8.6 - 10.3 mg/dL    Bilirubin Total 0.6 0.3 - 1.0 mg/dL    Albumin 4.3 3.5 - 5.7 g/dL    Protein Total 7.4 6.4 - 8.9 g/dL    Alkaline Phosphatase 72 34 - 104 U/L    ALT 18 7 - 52 U/L    AST 18 13 - 39 U/L   Hemoglobin A1c   Result Value Ref Range    Hemoglobin A1C 7.5 (H) 4.0 - 6.0 %   Lipid Profile   Result Value Ref Range    Cholesterol 121 <200 mg/dL    Triglycerides 66 <150 mg/dL    HDL Cholesterol 42 23 - 92 mg/dL    LDL Cholesterol Calculated 66 <100 mg/dL    Non HDL Cholesterol 79 <130 mg/dL   *UA reflex to Microscopic   Result Value Ref Range    Color Urine Yellow     Appearance Urine Clear     Glucose Urine Negative NEG^Negative mg/dL    Bilirubin Urine Negative NEG^Negative    Ketones Urine Negative NEG^Negative mg/dL    Specific Gravity Urine 1.010 1.000 - 1.030    Blood Urine  Negative NEG^Negative    pH Urine 6.0 5.0 - 9.0 pH    Protein Albumin Urine Negative NEG^Negative mg/dL    Urobilinogen Urine 0.2 0.2 - 1.0 EU/dL    Nitrite Urine Negative NEG^Negative    Leukocyte Esterase Urine Negative NEG^Negative    Source Midstream Urine       Return for Diabetes labs and clinic follow-up appointment every 3 to 4 months.  --- (Go for about 91 to 100 days)    Aspects of Diabetes we can improve:  Hemoglobin A1c Lab Results   Component Value Date    A1C 7.5 10/15/2018    A1C 7.9 07/09/2018    A1C 7.8 03/12/2018    Goal range is under 8. Best is 6.5 to 7   Blood Pressure 134/88 Goal to keep less than 140/90   Tobacco  reports that he has never smoked. He has never used smokeless tobacco. Goal to abstain from tobacco   Aspirin or Plavix  aspirin Aspirin or Plavix reduces risk of heart disease and stroke   ACE/ARB  losartan These medications reduce risk of kidney disease   Cholesterol  Lipitor Statins reduce risk of heart disease and stroke   Eye Exam -- Do Yearly -- Annual diabetic eye exam   Healthy weight Body mass index is 28.98 kg/(m^2). Goal BMI under 30, best is under 25.      -- Trying to exercise daily (goal at least 20 min/day) with moderate aerobic activity   -- Eat healthy (resources from ADA at http://www.diabetes.org/)   -- Taking good care of my feet. Consider seeing the Podiatrist   -- Check blood sugars as directed, record in log book and bring to every appointment      Schedule lab only appointment --- A few days AFTER: 1/13/19   Schedule clinic appointment with Dr. iWlson -- Same day as labs, or 1-2 days later.     Insurance companies are now grading you and I on your blood sugar control -- Goal is to get your A1c down to 7.9% or lower and NO Smoking!    -- Medicare and most insurance companies, will only cover Hemoglobin A1c labs to be rechecked every 91+ days.      Hemoglobin A1C   Date Value Ref Range Status   10/15/2018 7.5 (H) 4.0 - 6.0 % Final   07/09/2018 7.9 (H) 4.0 - 6.0  % Final       Next follow-up appointment with Dr. Wilson should be scheduled:  -- Approximately a few days AFTER: 1/13/19

## 2018-10-15 NOTE — PROGRESS NOTES
Nursing Notes:   Stephenie Varghese LPN  10/15/2018 10:30 AM  Signed  Patient presents to the clinic for diabetes management.    Previous A1C is at goal of <8  Lab Results   Component Value Date    A1C 7.5 10/15/2018    A1C 7.9 07/09/2018    A1C 7.8 03/12/2018     Urine microalbumin:creatine: n/a  Foot exam this past year-declines at this time  Eye exam 03/2018    Tobacco User no  Patient is on a daily aspirin  Patient is on a Statin.  Blood pressure today of:     BP Readings from Last 1 Encounters:   07/10/18 118/66      is at the goal of <139/89 for diabetics.    Stephenie Varghese LPN on 10/15/2018 at 10:20 AM      Nursing note reviewed with patient.  Accurracy and completeness verified.   Mr. Delarosa is a 63 year old male who:  Patient presents with:  Diabetes      ICD-10-CM    1. Controlled type 2 diabetes mellitus with complication, with long-term current use of insulin (H) E11.8 metFORMIN (GLUCOPHAGE) 1000 MG tablet    Z79.4 glipiZIDE (GLUCOTROL) 10 MG tablet     glipiZIDE-metFORMIN (METAGLIP) 2.5-250 MG per tablet   2. Mixed hyperlipidemia E78.2 atorvastatin (LIPITOR) 40 MG tablet   3. Benign essential hypertension I10 losartan (COZAAR) 50 MG tablet   4. CKD (chronic kidney disease) stage 2, GFR 60-89 ml/min N18.2    5. Hereditary and idiopathic peripheral neuropathy G60.9      HPI  Diabetes, currently well controlled.  Doing well with medication regimen.  Tolerating glipizide and metformin well.  Still using Lantus at bedtime.  A1c has improved.  Needs medication refills.  Has some neuropathy of the feet, states it is only somewhat annoying does not really cause a lot of pain.    Mixed hyperlipidemia, doing well with Lipitor.  Lipid levels are controlled.    Hypertension, currently well controlled with losartan.  States his blood pressures at home typically run in the 118 range systolic.    Has stage II chronic kidney disease, based on GFR estimations today.  Currently in the 82 range.    Neuropathy, see above,  part of which is hereditary, idiopathic, secondary to diabetes.  Overall reports stable.  Denies need for nerve pain medication at this time.    Functional Capacity: > 4 METS.   Reports that he can climb a flight of stairs without any chest pain/heaviness or shortness of breath.   No orthopnea/paroxysmal nocturnal dyspnea  Review of Systems   Constitutional: Negative for chills, fatigue and fever.   HENT: Negative for congestion and hearing loss.    Eyes: Negative for pain and visual disturbance.   Respiratory: Negative for cough, shortness of breath and wheezing.    Cardiovascular: Negative for chest pain and palpitations.   Gastrointestinal: Negative for abdominal pain, diarrhea, nausea and vomiting.   Endocrine: Negative for cold intolerance and heat intolerance.   Genitourinary: Negative for dysuria and hematuria.   Musculoskeletal: Negative for arthralgias and myalgias.   Skin: Negative for pallor.   Allergic/Immunologic: Negative for immunocompromised state.   Neurological: Positive for numbness. Negative for dizziness and light-headedness.   Hematological: Does not bruise/bleed easily.   Psychiatric/Behavioral: Negative for agitation and confusion.      SHIRLENE:   SHIRLENE-7 SCORE 3/13/2018 7/10/2018 10/15/2018   Total Score 0 0 0     PHQ9:  PHQ-9 SCORE 3/13/2018 7/10/2018 10/15/2018   Total Score 0 0 0       I have personally reviewed the past medical history, past surgical history, medications, allergies, family and social history as listed below, on 10/15/2018.    Allergies   Allergen Reactions     Lisinopril Cough       Current Outpatient Prescriptions   Medication Sig Dispense Refill     aspirin (GOODSENSE ASPIRIN) 325 MG tablet Take 325 mg by mouth daily with food       atorvastatin (LIPITOR) 40 MG tablet Take 1 tablet (40 mg) by mouth daily 90 tablet 3     blood glucose monitoring (NO BRAND SPECIFIED) test strip 1 strip by In Vitro route 4 times daily Use to test blood sugars 4 times daily or as directed-okay  to substitute for insurance coverage 400 each 3     Blood Glucose Monitoring Suppl (GLUCOCOM BLOOD GLUCOSE MONITOR) JACKSON Dispense glucose meter, test strips and lancets covered by the patient insurance. Test 4 times per day.       Calcium Carb-Cholecalciferol (CALCIUM 500 +D PO) Take 2 tablets by mouth daily       Cholecalciferol (VITAMIN D3) 2000 UNITS CAPS 4000 to 5000 IU daily For Vitamin D Deficiency       glipiZIDE (GLUCOTROL) 10 MG tablet Take 1 tablet (10 mg) by mouth 2 times daily (before meals) 180 tablet 3     glipiZIDE-metFORMIN (METAGLIP) 2.5-250 MG per tablet Take 1 tablet by mouth daily 90 tablet 3     insulin glargine (LANTUS SOLOSTAR) 100 UNIT/ML pen Inject 15-25 units subcutaneously before bedtime. 18 mL 3     insulin pen needle ([employer] MARJORIE U/F) 32G X 4 MM AS DIRECTED FOR ADMINISTERING INSULIN AT HOME. FOR USE WITH LANTUS INJECTIONS ONCE DAILY 100 each 3     losartan (COZAAR) 50 MG tablet Take 1 tablet (50 mg) by mouth daily 90 tablet 3     metFORMIN (GLUCOPHAGE) 1000 MG tablet Take 1 tablet (1,000 mg) by mouth 2 times daily (with meals) 180 tablet 3     [DISCONTINUED] atorvastatin (LIPITOR) 40 MG tablet Take 40 mg by mouth daily       [DISCONTINUED] glipiZIDE (GLUCOTROL) 10 MG tablet Take 10 mg by mouth 2 times daily (before meals)       [DISCONTINUED] glipiZIDE-metFORMIN (METAGLIP) 2.5-250 MG per tablet TAKE ONE TABLET BY MOUTH EVERY DAY WITH LUNCH 90 tablet 1     [DISCONTINUED] losartan (COZAAR) 50 MG tablet Take 50 mg by mouth daily       [DISCONTINUED] metFORMIN (GLUCOPHAGE) 1000 MG tablet Take 1,000 mg by mouth 2 times daily (with meals)          Patient Active Problem List    Diagnosis Date Noted     CKD (chronic kidney disease) stage 2, GFR 60-89 ml/min 10/15/2018     Priority: Medium     Mixed hyperlipidemia 03/06/2018     Priority: Medium     Benign essential hypertension 03/06/2018     Priority: Medium     Hereditary and idiopathic peripheral neuropathy 01/29/2018     Priority: Medium      Controlled type 2 diabetes mellitus with complication, with long-term current use of insulin (H) 10/25/2016     Priority: Medium     Chronic back pain 2015     Priority: Medium     Leiomyoma of the skin 2015     Priority: Medium     Actinic keratosis of right temple 2014     Priority: Medium     Strain of flexor muscle of right hip 2014     Priority: Medium     History of nephrolithiasis 2014     Priority: Medium     H/O hyperparathyroidism 04/10/2014     Priority: Medium     Increased PTH level 04/10/2014     Priority: Medium     S/P subtotal parathyroidectomy (H) 04/10/2014     Priority: Medium     Vitamin D deficiency 04/10/2014     Priority: Medium     Diabetic retinopathy associated with type 2 diabetes mellitus (H) 2013     Priority: Medium     Lung nodule 2013     Priority: Medium     Hematuria 2012     Priority: Medium     Past Medical History:   Diagnosis Date     Other ehrlichiosis     2007     Unspecified injury of left wrist, hand and finger(s), initial encounter     scaphoid bruise     Past Surgical History:   Procedure Laterality Date     OTHER SURGICAL HISTORY      ,[id number],PARATHYROIDECTOMY     TONSILLECTOMY      age 5     Social History     Social History     Marital status:      Spouse name: N/A     Number of children: N/A     Years of education: N/A     Social History Main Topics     Smoking status: Never Smoker     Smokeless tobacco: Never Used     Alcohol use No     Drug use: No     Sexual activity: No     Other Topics Concern     None     Social History Narrative    Never a smoker.  Walks regularly,      Family History   Problem Relation Age of Onset     HEART DISEASE Father      Heart Disease, at age 73, CAD, complications of percutaneous revascularization of abdominal aortic aneurysm     Family History Negative Mother      Good Health,83     Substance Abuse Brother      Alcohol/Drug,Alcoholism       EXAM:   Vitals:     "10/15/18 1022   BP: 134/88   BP Location: Right arm   Patient Position: Sitting   Cuff Size: Adult Regular   Pulse: 68   Weight: 199 lb 2 oz (90.3 kg)       Current Pain Score: Extreme Pain (8)     BP Readings from Last 3 Encounters:   10/15/18 134/88   07/10/18 118/66   03/13/18 138/80      Wt Readings from Last 3 Encounters:   10/15/18 199 lb 2 oz (90.3 kg)   07/10/18 199 lb 8 oz (90.5 kg)   03/13/18 199 lb 6 oz (90.4 kg)      Estimated body mass index is 28.98 kg/(m^2) as calculated from the following:    Height as of 12/7/17: 5' 9.5\" (1.765 m).    Weight as of this encounter: 199 lb 2 oz (90.3 kg).     Physical Exam   Constitutional: He appears well-developed and well-nourished. No distress.   HENT:   Head: Normocephalic and atraumatic.   Eyes: Conjunctivae are normal. No scleral icterus.   Neck: No thyromegaly present.   Cardiovascular: Normal rate and regular rhythm.    No carotid Bruits   Pulmonary/Chest: Effort normal. No respiratory distress. He has no wheezes.   Abdominal: Soft. There is no tenderness.   Musculoskeletal: He exhibits no tenderness or deformity.   Lymphadenopathy:     He has no cervical adenopathy.   Neurological: He is alert. No cranial nerve deficit.   Skin: Skin is warm and dry.   Psychiatric: He has a normal mood and affect.        INVESTIGATIONS:  Results for orders placed or performed in visit on 10/15/18   CBC with platelets   Result Value Ref Range    WBC 7.7 4.0 - 11.0 10e9/L    RBC Count 5.22 4.4 - 5.9 10e12/L    Hemoglobin 15.4 13.3 - 17.7 g/dL    Hematocrit 45.4 40.0 - 53.0 %    MCV 87 78 - 100 fl    MCH 29.5 26.5 - 33.0 pg    MCHC 33.9 31.5 - 36.5 g/dL    RDW 13.2 10.0 - 15.0 %    Platelet Count 251 150 - 450 10e9/L   Comprehensive metabolic panel   Result Value Ref Range    Sodium 135 134 - 144 mmol/L    Potassium 4.4 3.5 - 5.1 mmol/L    Chloride 102 98 - 107 mmol/L    Carbon Dioxide 28 21 - 31 mmol/L    Anion Gap 5 3 - 14 mmol/L    Glucose 122 (H) 70 - 105 mg/dL    Urea " Nitrogen 19 7 - 25 mg/dL    Creatinine 0.93 0.70 - 1.30 mg/dL    GFR Estimate 82 >60 mL/min/1.7m2    GFR Estimate If Black >90 >60 mL/min/1.7m2    Calcium 9.6 8.6 - 10.3 mg/dL    Bilirubin Total 0.6 0.3 - 1.0 mg/dL    Albumin 4.3 3.5 - 5.7 g/dL    Protein Total 7.4 6.4 - 8.9 g/dL    Alkaline Phosphatase 72 34 - 104 U/L    ALT 18 7 - 52 U/L    AST 18 13 - 39 U/L   Hemoglobin A1c   Result Value Ref Range    Hemoglobin A1C 7.5 (H) 4.0 - 6.0 %   Lipid Profile   Result Value Ref Range    Cholesterol 121 <200 mg/dL    Triglycerides 66 <150 mg/dL    HDL Cholesterol 42 23 - 92 mg/dL    LDL Cholesterol Calculated 66 <100 mg/dL    Non HDL Cholesterol 79 <130 mg/dL   *UA reflex to Microscopic   Result Value Ref Range    Color Urine Yellow     Appearance Urine Clear     Glucose Urine Negative NEG^Negative mg/dL    Bilirubin Urine Negative NEG^Negative    Ketones Urine Negative NEG^Negative mg/dL    Specific Gravity Urine 1.010 1.000 - 1.030    Blood Urine Negative NEG^Negative    pH Urine 6.0 5.0 - 9.0 pH    Protein Albumin Urine Negative NEG^Negative mg/dL    Urobilinogen Urine 0.2 0.2 - 1.0 EU/dL    Nitrite Urine Negative NEG^Negative    Leukocyte Esterase Urine Negative NEG^Negative    Source Midstream Urine        ASSESSMENT AND PLAN:  Problem List Items Addressed This Visit        Nervous and Auditory    Hereditary and idiopathic peripheral neuropathy       Endocrine    Controlled type 2 diabetes mellitus with complication, with long-term current use of insulin (H) - Primary    Relevant Medications    metFORMIN (GLUCOPHAGE) 1000 MG tablet    glipiZIDE (GLUCOTROL) 10 MG tablet    glipiZIDE-metFORMIN (METAGLIP) 2.5-250 MG per tablet    Mixed hyperlipidemia    Relevant Medications    atorvastatin (LIPITOR) 40 MG tablet       Circulatory    Benign essential hypertension    Relevant Medications    losartan (COZAAR) 50 MG tablet       Urinary    CKD (chronic kidney disease) stage 2, GFR 60-89 ml/min        reviewed diet,  exercise and weight control, recommended sodium restriction, cardiovascular risk and specific lipid/LDL goals reviewed, specific diabetic recommendations low cholesterol diet, weight control and daily exercise discussed, use of aspirin to prevent MI and TIA's discussed  -- Expected clinical course discussed    -- Medications and their side effects discussed    Patient Instructions     Labs are better today.    Medications refilled.     Blood pressure is well controlled.    Hemoglobin A1c/diabetes lab has improved.    Kidney function is unchanged over the past 3 months, based on your estimated GFR, kidney function is in the stage II CKD range.  -- Avoid NSAIDS like Ibuprofen/Advil, Naproxen/Aleve -- as much as possible.     Results for orders placed or performed in visit on 10/15/18   CBC with platelets   Result Value Ref Range    WBC 7.7 4.0 - 11.0 10e9/L    RBC Count 5.22 4.4 - 5.9 10e12/L    Hemoglobin 15.4 13.3 - 17.7 g/dL    Hematocrit 45.4 40.0 - 53.0 %    MCV 87 78 - 100 fl    MCH 29.5 26.5 - 33.0 pg    MCHC 33.9 31.5 - 36.5 g/dL    RDW 13.2 10.0 - 15.0 %    Platelet Count 251 150 - 450 10e9/L   Comprehensive metabolic panel   Result Value Ref Range    Sodium 135 134 - 144 mmol/L    Potassium 4.4 3.5 - 5.1 mmol/L    Chloride 102 98 - 107 mmol/L    Carbon Dioxide 28 21 - 31 mmol/L    Anion Gap 5 3 - 14 mmol/L    Glucose 122 (H) 70 - 105 mg/dL    Urea Nitrogen 19 7 - 25 mg/dL    Creatinine 0.93 0.70 - 1.30 mg/dL    GFR Estimate 82 >60 mL/min/1.7m2    GFR Estimate If Black >90 >60 mL/min/1.7m2    Calcium 9.6 8.6 - 10.3 mg/dL    Bilirubin Total 0.6 0.3 - 1.0 mg/dL    Albumin 4.3 3.5 - 5.7 g/dL    Protein Total 7.4 6.4 - 8.9 g/dL    Alkaline Phosphatase 72 34 - 104 U/L    ALT 18 7 - 52 U/L    AST 18 13 - 39 U/L   Hemoglobin A1c   Result Value Ref Range    Hemoglobin A1C 7.5 (H) 4.0 - 6.0 %   Lipid Profile   Result Value Ref Range    Cholesterol 121 <200 mg/dL    Triglycerides 66 <150 mg/dL    HDL Cholesterol 42 23  - 92 mg/dL    LDL Cholesterol Calculated 66 <100 mg/dL    Non HDL Cholesterol 79 <130 mg/dL   *UA reflex to Microscopic   Result Value Ref Range    Color Urine Yellow     Appearance Urine Clear     Glucose Urine Negative NEG^Negative mg/dL    Bilirubin Urine Negative NEG^Negative    Ketones Urine Negative NEG^Negative mg/dL    Specific Gravity Urine 1.010 1.000 - 1.030    Blood Urine Negative NEG^Negative    pH Urine 6.0 5.0 - 9.0 pH    Protein Albumin Urine Negative NEG^Negative mg/dL    Urobilinogen Urine 0.2 0.2 - 1.0 EU/dL    Nitrite Urine Negative NEG^Negative    Leukocyte Esterase Urine Negative NEG^Negative    Source Midstream Urine       Return for Diabetes labs and clinic follow-up appointment every 3 to 4 months.  --- (Go for about 91 to 100 days)    Aspects of Diabetes we can improve:  Hemoglobin A1c Lab Results   Component Value Date    A1C 7.5 10/15/2018    A1C 7.9 07/09/2018    A1C 7.8 03/12/2018    Goal range is under 8. Best is 6.5 to 7   Blood Pressure 134/88 Goal to keep less than 140/90   Tobacco  reports that he has never smoked. He has never used smokeless tobacco. Goal to abstain from tobacco   Aspirin or Plavix  aspirin Aspirin or Plavix reduces risk of heart disease and stroke   ACE/ARB  losartan These medications reduce risk of kidney disease   Cholesterol  Lipitor Statins reduce risk of heart disease and stroke   Eye Exam -- Do Yearly -- Annual diabetic eye exam   Healthy weight Body mass index is 28.98 kg/(m^2). Goal BMI under 30, best is under 25.      -- Trying to exercise daily (goal at least 20 min/day) with moderate aerobic activity   -- Eat healthy (resources from ADA at http://www.diabetes.org/)   -- Taking good care of my feet. Consider seeing the Podiatrist   -- Check blood sugars as directed, record in log book and bring to every appointment      Schedule lab only appointment --- A few days AFTER: 1/13/19   Schedule clinic appointment with Dr. Wilson -- Same day as labs, or 1-2  days later.     Insurance companies are now grading you and I on your blood sugar control -- Goal is to get your A1c down to 7.9% or lower and NO Smoking!    -- Medicare and most insurance companies, will only cover Hemoglobin A1c labs to be rechecked every 91+ days.      Hemoglobin A1C   Date Value Ref Range Status   10/15/2018 7.5 (H) 4.0 - 6.0 % Final   07/09/2018 7.9 (H) 4.0 - 6.0 % Final       Next follow-up appointment with Dr. Wilson should be scheduled:  -- Approximately a few days AFTER: 1/13/19       Moshe Wilson MD  Internal Medicine  [facility]

## 2018-10-16 ASSESSMENT — PATIENT HEALTH QUESTIONNAIRE - PHQ9: SUM OF ALL RESPONSES TO PHQ QUESTIONS 1-9: 0

## 2018-10-16 ASSESSMENT — ANXIETY QUESTIONNAIRES: GAD7 TOTAL SCORE: 0

## 2019-01-15 ENCOUNTER — OFFICE VISIT (OUTPATIENT)
Dept: INTERNAL MEDICINE | Facility: OTHER | Age: 64
End: 2019-01-15
Attending: INTERNAL MEDICINE
Payer: MEDICARE

## 2019-01-15 VITALS
HEIGHT: 70 IN | SYSTOLIC BLOOD PRESSURE: 138 MMHG | BODY MASS INDEX: 28.63 KG/M2 | HEART RATE: 80 BPM | DIASTOLIC BLOOD PRESSURE: 80 MMHG | WEIGHT: 200 LBS

## 2019-01-15 DIAGNOSIS — Z79.4 CONTROLLED TYPE 2 DIABETES MELLITUS WITH COMPLICATION, WITH LONG-TERM CURRENT USE OF INSULIN (H): ICD-10-CM

## 2019-01-15 DIAGNOSIS — Z79.4 CONTROLLED TYPE 2 DIABETES MELLITUS WITH COMPLICATION, WITH LONG-TERM CURRENT USE OF INSULIN (H): Primary | ICD-10-CM

## 2019-01-15 DIAGNOSIS — E11.8 CONTROLLED TYPE 2 DIABETES MELLITUS WITH COMPLICATION, WITH LONG-TERM CURRENT USE OF INSULIN (H): Primary | ICD-10-CM

## 2019-01-15 DIAGNOSIS — I10 ESSENTIAL HYPERTENSION: ICD-10-CM

## 2019-01-15 DIAGNOSIS — E78.2 MIXED HYPERLIPIDEMIA: ICD-10-CM

## 2019-01-15 DIAGNOSIS — E11.8 CONTROLLED TYPE 2 DIABETES MELLITUS WITH COMPLICATION, WITH LONG-TERM CURRENT USE OF INSULIN (H): ICD-10-CM

## 2019-01-15 LAB
ALBUMIN SERPL-MCNC: 4.6 G/DL (ref 3.5–5.7)
ALBUMIN UR-MCNC: NEGATIVE MG/DL
ALP SERPL-CCNC: 82 U/L (ref 34–104)
ALT SERPL W P-5'-P-CCNC: 18 U/L (ref 7–52)
ANION GAP SERPL CALCULATED.3IONS-SCNC: 8 MMOL/L (ref 3–14)
APPEARANCE UR: CLEAR
AST SERPL W P-5'-P-CCNC: 19 U/L (ref 13–39)
BILIRUB SERPL-MCNC: 0.6 MG/DL (ref 0.3–1)
BILIRUB UR QL STRIP: NEGATIVE
BUN SERPL-MCNC: 11 MG/DL (ref 7–25)
CALCIUM SERPL-MCNC: 10 MG/DL (ref 8.6–10.3)
CHLORIDE SERPL-SCNC: 104 MMOL/L (ref 98–107)
CHOLEST SERPL-MCNC: 121 MG/DL
CO2 SERPL-SCNC: 28 MMOL/L (ref 21–31)
COLOR UR AUTO: YELLOW
CREAT SERPL-MCNC: 0.85 MG/DL (ref 0.7–1.3)
CREAT UR-MCNC: 28 MG/DL
ERYTHROCYTE [DISTWIDTH] IN BLOOD BY AUTOMATED COUNT: 13.2 % (ref 10–15)
GFR SERPL CREATININE-BSD FRML MDRD: >90 ML/MIN/{1.73_M2}
GLUCOSE SERPL-MCNC: 114 MG/DL (ref 70–105)
GLUCOSE UR STRIP-MCNC: NEGATIVE MG/DL
HBA1C MFR BLD: 7.9 % (ref 4–6)
HCT VFR BLD AUTO: 47.3 % (ref 40–53)
HDLC SERPL-MCNC: 40 MG/DL (ref 23–92)
HGB BLD-MCNC: 15.9 G/DL (ref 13.3–17.7)
HGB UR QL STRIP: NEGATIVE
KETONES UR STRIP-MCNC: NEGATIVE MG/DL
LDLC SERPL CALC-MCNC: 70 MG/DL
LEUKOCYTE ESTERASE UR QL STRIP: NEGATIVE
MCH RBC QN AUTO: 29.1 PG (ref 26.5–33)
MCHC RBC AUTO-ENTMCNC: 33.6 G/DL (ref 31.5–36.5)
MCV RBC AUTO: 87 FL (ref 78–100)
MICROALBUMIN UR-MCNC: 8 MG/L
MICROALBUMIN/CREAT UR: 27.77 MG/G CR (ref 0–17)
NITRATE UR QL: NEGATIVE
NONHDLC SERPL-MCNC: 81 MG/DL
PH UR STRIP: 8 PH (ref 5–9)
PLATELET # BLD AUTO: 231 10E9/L (ref 150–450)
POTASSIUM SERPL-SCNC: 4.4 MMOL/L (ref 3.5–5.1)
PROT SERPL-MCNC: 8 G/DL (ref 6.4–8.9)
RBC # BLD AUTO: 5.46 10E12/L (ref 4.4–5.9)
SODIUM SERPL-SCNC: 140 MMOL/L (ref 134–144)
SOURCE: NORMAL
SP GR UR STRIP: 1.01 (ref 1–1.03)
TRIGL SERPL-MCNC: 54 MG/DL
UROBILINOGEN UR STRIP-ACNC: 0.2 EU/DL (ref 0.2–1)
WBC # BLD AUTO: 7.8 10E9/L (ref 4–11)

## 2019-01-15 PROCEDURE — 83036 HEMOGLOBIN GLYCOSYLATED A1C: CPT | Performed by: INTERNAL MEDICINE

## 2019-01-15 PROCEDURE — 81003 URINALYSIS AUTO W/O SCOPE: CPT | Performed by: INTERNAL MEDICINE

## 2019-01-15 PROCEDURE — 82043 UR ALBUMIN QUANTITATIVE: CPT | Performed by: INTERNAL MEDICINE

## 2019-01-15 PROCEDURE — 99214 OFFICE O/P EST MOD 30 MIN: CPT | Performed by: INTERNAL MEDICINE

## 2019-01-15 PROCEDURE — 80053 COMPREHEN METABOLIC PANEL: CPT | Performed by: INTERNAL MEDICINE

## 2019-01-15 PROCEDURE — G0463 HOSPITAL OUTPT CLINIC VISIT: HCPCS

## 2019-01-15 PROCEDURE — 36415 COLL VENOUS BLD VENIPUNCTURE: CPT | Performed by: INTERNAL MEDICINE

## 2019-01-15 PROCEDURE — 85027 COMPLETE CBC AUTOMATED: CPT | Performed by: INTERNAL MEDICINE

## 2019-01-15 PROCEDURE — 80061 LIPID PANEL: CPT | Performed by: INTERNAL MEDICINE

## 2019-01-15 ASSESSMENT — ENCOUNTER SYMPTOMS
DIZZINESS: 0
PALPITATIONS: 0
NUMBNESS: 1
SHORTNESS OF BREATH: 0
BRUISES/BLEEDS EASILY: 0
EYE PAIN: 0
HEMATURIA: 0
CONFUSION: 0
NAUSEA: 0
LIGHT-HEADEDNESS: 0
DIARRHEA: 0
AGITATION: 0
FEVER: 0
FATIGUE: 0
WHEEZING: 0
CHILLS: 0
COUGH: 0
DYSURIA: 0
ARTHRALGIAS: 0
VOMITING: 0
MYALGIAS: 0
ABDOMINAL PAIN: 0

## 2019-01-15 ASSESSMENT — ANXIETY QUESTIONNAIRES
2. NOT BEING ABLE TO STOP OR CONTROL WORRYING: NOT AT ALL
3. WORRYING TOO MUCH ABOUT DIFFERENT THINGS: NOT AT ALL
7. FEELING AFRAID AS IF SOMETHING AWFUL MIGHT HAPPEN: NOT AT ALL
IF YOU CHECKED OFF ANY PROBLEMS ON THIS QUESTIONNAIRE, HOW DIFFICULT HAVE THESE PROBLEMS MADE IT FOR YOU TO DO YOUR WORK, TAKE CARE OF THINGS AT HOME, OR GET ALONG WITH OTHER PEOPLE: NOT DIFFICULT AT ALL
5. BEING SO RESTLESS THAT IT IS HARD TO SIT STILL: NOT AT ALL
GAD7 TOTAL SCORE: 0
6. BECOMING EASILY ANNOYED OR IRRITABLE: NOT AT ALL
1. FEELING NERVOUS, ANXIOUS, OR ON EDGE: NOT AT ALL

## 2019-01-15 ASSESSMENT — MIFFLIN-ST. JEOR: SCORE: 1700.5

## 2019-01-15 ASSESSMENT — PATIENT HEALTH QUESTIONNAIRE - PHQ9
5. POOR APPETITE OR OVEREATING: NOT AT ALL
SUM OF ALL RESPONSES TO PHQ QUESTIONS 1-9: 0

## 2019-01-15 ASSESSMENT — PAIN SCALES - GENERAL: PAINLEVEL: SEVERE PAIN (7)

## 2019-01-15 NOTE — PROGRESS NOTES
"Nursing Notes:   Stephenie Varghese LPN  1/15/2019  9:44 AM  Signed  Patient presents to the clinic for diabetes management.        Previous A1C is at goal of <8  Lab Results   Component Value Date    A1C 7.9 01/15/2019    A1C 7.5 10/15/2018    A1C 7.9 07/09/2018    A1C 7.8 03/12/2018     Urine microalbumin:creatine: n/a  Foot exam this past year  Eye exam 03/2018    Tobacco User no  Patient is on a daily aspirin  Patient is on a Statin.  Blood pressure today of:     BP Readings from Last 1 Encounters:   10/15/18 134/88      is at the goal of <139/89 for diabetics.      Chief Complaint   Patient presents with     Diabetes       Initial /80 (BP Location: Right arm, Patient Position: Sitting, Cuff Size: Adult Regular)   Pulse 80   Ht 1.765 m (5' 9.5\")   Wt 90.7 kg (200 lb)   BMI 29.11 kg/m    Estimated body mass index is 29.11 kg/m  as calculated from the following:    Height as of this encounter: 1.765 m (5' 9.5\").    Weight as of this encounter: 90.7 kg (200 lb).  Medication Reconciliation: complete    Stephenie Varghese LPN      Nursing note reviewed with patient.  Accuracy and completeness verified.   Mr. Delarosa is a 63 year old male who:  Patient presents with:  Diabetes      ICD-10-CM    1. Controlled type 2 diabetes mellitus with complication, with long-term current use of insulin (H) E11.8     Z79.4    2. Mixed hyperlipidemia E78.2    3. Essential hypertension I10      HPI  Diabetes, currently controlled.  A1c did go up slightly compared to before.  States that he has been having fresh fruit and yogurt every night at 9:30 PM.  Has only had 2 low blood sugars in the last couple of months.  They were not dangerously low.    He is going to try making some dietary changes and get rid of his fresh fruit and yogurt at bedtime and switch to some protein snacks instead.    Mixed hyperlipidemia, currently well controlled.  Tolerating Lipitor.  No side effects reported.  Continue with current dosing.    Essential " hypertension, currently well controlled.  Tolerate medication.  No side effects reported.  Continue current dosing.  No lightheadedness or dizziness issues reported.    Functional Capacity: > 4 METS.   Review of Systems   Constitutional: Negative for chills, fatigue and fever.   HENT: Negative for congestion and hearing loss.    Eyes: Negative for pain and visual disturbance.   Respiratory: Negative for cough, shortness of breath and wheezing.    Cardiovascular: Negative for chest pain and palpitations.   Gastrointestinal: Negative for abdominal pain, diarrhea, nausea and vomiting.   Endocrine: Negative for cold intolerance and heat intolerance.   Genitourinary: Negative for dysuria and hematuria.   Musculoskeletal: Negative for arthralgias and myalgias.   Skin: Negative for pallor.   Allergic/Immunologic: Negative for immunocompromised state.   Neurological: Positive for numbness (+ bilateral feet, comes and goes). Negative for dizziness and light-headedness.   Hematological: Does not bruise/bleed easily.   Psychiatric/Behavioral: Negative for agitation and confusion.      SHIRLENE:   SHIRLENE-7 SCORE 7/10/2018 10/15/2018 1/15/2019   Total Score 0 0 0     PHQ9:  PHQ-9 SCORE 7/10/2018 10/15/2018 1/15/2019   PHQ-9 Total Score 0 0 0       I have personally reviewed the past medical history, past surgical history, medications, allergies, family and social history as listed below, on 1/15/2019.    Allergies   Allergen Reactions     Lisinopril Cough       Current Outpatient Medications   Medication Sig Dispense Refill     aspirin (GOODSENSE ASPIRIN) 325 MG tablet Take 325 mg by mouth daily with food       atorvastatin (LIPITOR) 40 MG tablet Take 1 tablet (40 mg) by mouth daily 90 tablet 3     blood glucose monitoring (NO BRAND SPECIFIED) test strip 1 strip by In Vitro route 4 times daily Use to test blood sugars 4 times daily or as directed-okay to substitute for insurance coverage 400 each 3     Blood Glucose Monitoring Suppl  (GLUCOCOM BLOOD GLUCOSE MONITOR) JACKSON Dispense glucose meter, test strips and lancets covered by the patient insurance. Test 4 times per day.       Calcium Carb-Cholecalciferol (CALCIUM 500 +D PO) Take 2 tablets by mouth daily       Cholecalciferol (VITAMIN D3) 2000 UNITS CAPS 4000 to 5000 IU daily For Vitamin D Deficiency       glipiZIDE (GLUCOTROL) 10 MG tablet Take 1 tablet (10 mg) by mouth 2 times daily (before meals) 180 tablet 3     glipiZIDE-metFORMIN (METAGLIP) 2.5-250 MG per tablet Take 1 tablet by mouth daily 90 tablet 3     insulin glargine (LANTUS SOLOSTAR) 100 UNIT/ML pen Inject 15-25 units subcutaneously before bedtime. 18 mL 3     insulin pen needle (BD MARJORIE U/F) 32G X 4 MM AS DIRECTED FOR ADMINISTERING INSULIN AT HOME. FOR USE WITH LANTUS INJECTIONS ONCE DAILY 100 each 3     losartan (COZAAR) 50 MG tablet Take 1 tablet (50 mg) by mouth daily 90 tablet 3     metFORMIN (GLUCOPHAGE) 1000 MG tablet Take 1 tablet (1,000 mg) by mouth 2 times daily (with meals) 180 tablet 3        Patient Active Problem List    Diagnosis Date Noted     CKD (chronic kidney disease) stage 2, GFR 60-89 ml/min 10/15/2018     Priority: Medium     Mixed hyperlipidemia 03/06/2018     Priority: Medium     Essential hypertension 03/06/2018     Priority: Medium     Hereditary and idiopathic peripheral neuropathy 01/29/2018     Priority: Medium     Controlled type 2 diabetes mellitus with complication, with long-term current use of insulin (H) 10/25/2016     Priority: Medium     Chronic back pain 04/30/2015     Priority: Medium     Leiomyoma of the skin 04/29/2015     Priority: Medium     Actinic keratosis of right temple 12/17/2014     Priority: Medium     Strain of flexor muscle of right hip 12/17/2014     Priority: Medium     History of nephrolithiasis 06/24/2014     Priority: Medium     H/O hyperparathyroidism 04/10/2014     Priority: Medium     Increased PTH level 04/10/2014     Priority: Medium     S/P subtotal parathyroidectomy  "(H) 04/10/2014     Priority: Medium     Vitamin D deficiency 04/10/2014     Priority: Medium     Diabetic retinopathy associated with type 2 diabetes mellitus (H) 2013     Priority: Medium     Lung nodule 2013     Priority: Medium     Hematuria 2012     Priority: Medium     Past Medical History:   Diagnosis Date     Other ehrlichiosis     2007     Unspecified injury of left wrist, hand and finger(s), initial encounter     scaphoid bruise     Past Surgical History:   Procedure Laterality Date     OTHER SURGICAL HISTORY      ,JFL565,PARATHYROIDECTOMY     TONSILLECTOMY      age 5     Social History     Socioeconomic History     Marital status:      Spouse name: None     Number of children: None     Years of education: None     Highest education level: None   Social Needs     Financial resource strain: None     Food insecurity - worry: None     Food insecurity - inability: None     Transportation needs - medical: None     Transportation needs - non-medical: None   Occupational History     None   Tobacco Use     Smoking status: Never Smoker     Smokeless tobacco: Never Used   Substance and Sexual Activity     Alcohol use: No     Drug use: No     Sexual activity: No   Other Topics Concern     Parent/sibling w/ CABG, MI or angioplasty before 65F 55M? Not Asked   Social History Narrative    Never a smoker.  Walks regularly,      Family History   Problem Relation Age of Onset     Heart Disease Father         Heart Disease, at age 73, CAD, complications of percutaneous revascularization of abdominal aortic aneurysm     Family History Negative Mother         Good Health,83     Substance Abuse Brother         Alcohol/Drug,Alcoholism       EXAM:   Vitals:    01/15/19 0929   BP: 138/80   BP Location: Right arm   Patient Position: Sitting   Cuff Size: Adult Regular   Pulse: 80   Weight: 90.7 kg (200 lb)   Height: 1.765 m (5' 9.5\")       Current Pain Score: Severe Pain (7)     BP Readings " "from Last 3 Encounters:   01/15/19 138/80   10/15/18 134/88   07/10/18 118/66      Wt Readings from Last 3 Encounters:   01/15/19 90.7 kg (200 lb)   10/15/18 90.3 kg (199 lb 2 oz)   07/10/18 90.5 kg (199 lb 8 oz)      Estimated body mass index is 29.11 kg/m  as calculated from the following:    Height as of this encounter: 1.765 m (5' 9.5\").    Weight as of this encounter: 90.7 kg (200 lb).     Physical Exam   Constitutional: He appears well-developed and well-nourished. No distress.   HENT:   Head: Normocephalic and atraumatic.   Eyes: Conjunctivae and EOM are normal. No scleral icterus.   Neck: No thyromegaly present.   Cardiovascular: Normal rate and regular rhythm.   Pulmonary/Chest: Effort normal. No respiratory distress. He has no wheezes.   Abdominal: Soft. There is no tenderness.   Musculoskeletal: He exhibits no tenderness or deformity.   Lymphadenopathy:     He has no cervical adenopathy.   Neurological: He is alert. No cranial nerve deficit.   Skin: Skin is warm and dry.   Psychiatric: He has a normal mood and affect.      Procedures   INVESTIGATIONS:  -- see below.     ASSESSMENT AND PLAN:  Problem List Items Addressed This Visit        Endocrine    Controlled type 2 diabetes mellitus with complication, with long-term current use of insulin (H) - Primary    Mixed hyperlipidemia       Circulatory    Essential hypertension        reviewed diet, exercise and weight control, cardiovascular risk and specific lipid/LDL goals reviewed, specific diabetic recommendations low cholesterol diet, weight control and daily exercise discussed, use of aspirin to prevent MI and TIA's discussed  -- Expected clinical course discussed    -- Medications and their side effects discussed    Patient Instructions     Blood pressure is well controlled.  Continue current medications.    Diabetes is controlled but just barely.      Results for orders placed or performed in visit on 01/15/19   Lipid Profile   Result Value Ref Range "    Cholesterol 121 <200 mg/dL    Triglycerides 54 <150 mg/dL    HDL Cholesterol 40 23 - 92 mg/dL    LDL Cholesterol Calculated 70 <100 mg/dL    Non HDL Cholesterol 81 <130 mg/dL   Hemoglobin A1c   Result Value Ref Range    Hemoglobin A1C 7.9 (H) 4.0 - 6.0 %   Comprehensive metabolic panel   Result Value Ref Range    Sodium 140 134 - 144 mmol/L    Potassium 4.4 3.5 - 5.1 mmol/L    Chloride 104 98 - 107 mmol/L    Carbon Dioxide 28 21 - 31 mmol/L    Anion Gap 8 3 - 14 mmol/L    Glucose 114 (H) 70 - 105 mg/dL    Urea Nitrogen 11 7 - 25 mg/dL    Creatinine 0.85 0.70 - 1.30 mg/dL    GFR Estimate >90 >60 mL/min/[1.73_m2]    GFR Estimate If Black >90 >60 mL/min/[1.73_m2]    Calcium 10.0 8.6 - 10.3 mg/dL    Bilirubin Total 0.6 0.3 - 1.0 mg/dL    Albumin 4.6 3.5 - 5.7 g/dL    Protein Total 8.0 6.4 - 8.9 g/dL    Alkaline Phosphatase 82 34 - 104 U/L    ALT 18 7 - 52 U/L    AST 19 13 - 39 U/L   CBC with platelets   Result Value Ref Range    WBC 7.8 4.0 - 11.0 10e9/L    RBC Count 5.46 4.4 - 5.9 10e12/L    Hemoglobin 15.9 13.3 - 17.7 g/dL    Hematocrit 47.3 40.0 - 53.0 %    MCV 87 78 - 100 fl    MCH 29.1 26.5 - 33.0 pg    MCHC 33.6 31.5 - 36.5 g/dL    RDW 13.2 10.0 - 15.0 %    Platelet Count 231 150 - 450 10e9/L   *UA reflex to Microscopic   Result Value Ref Range    Color Urine Yellow     Appearance Urine Clear     Glucose Urine Negative NEG^Negative mg/dL    Bilirubin Urine Negative NEG^Negative    Ketones Urine Negative NEG^Negative mg/dL    Specific Gravity Urine 1.010 1.000 - 1.030    Blood Urine Negative NEG^Negative    pH Urine 8.0 5.0 - 9.0 pH    Protein Albumin Urine Negative NEG^Negative mg/dL    Urobilinogen Urine 0.2 0.2 - 1.0 EU/dL    Nitrite Urine Negative NEG^Negative    Leukocyte Esterase Urine Negative NEG^Negative    Source Midstream Urine           To help with weight loss and improve blood sugar control....    -- Try to avoid Carbohydrates as much as possible -- breads, pasta, baked goods, cakes, oatmeal, cold  cereal, potatoes.   These are turned to sugar in one metabolic conversion, cause insulin secretion and increased fat deposition / weight gain.      -- Eat more lean meats, proteins, eggs, nuts, vegetables.        Consider changing your evening snack over to peanuts or some type of protein.    Return for Diabetes labs and clinic follow-up appointment every 3 to 4 months.  --- (Go for about 91 to 100 days)    Aspects of Diabetes we can improve:  Hemoglobin A1c Lab Results   Component Value Date    A1C 7.9 01/15/2019    A1C 7.5 10/15/2018    A1C 7.9 07/09/2018    A1C 7.8 03/12/2018    Goal range is under 8. Best is 6.5 to 7   Blood Pressure 138/80 Goal to keep less than 140/90   Tobacco  reports that  has never smoked. he has never used smokeless tobacco. Goal to abstain from tobacco   Aspirin or Plavix  aspirin Aspirin or Plavix reduces risk of heart disease and stroke   ACE/ARB  losartan These medications reduce risk of kidney disease   Cholesterol  Lipitor Statins reduce risk of heart disease and stroke   Eye Exam -- Do Yearly -- Annual diabetic eye exam   Healthy weight Body mass index is 29.11 kg/m . Goal BMI under 30, best is under 25.      -- Trying to exercise daily (goal at least 20 min/day) with moderate aerobic activity   -- Eat healthy (resources from ADA at http://www.diabetes.org/)   -- Taking good care of my feet. Consider seeing the Podiatrist   -- Check blood sugars as directed, record in log book and bring to every appointment      Schedule lab only appointment --- A few days AFTER: 4/15/19   Schedule clinic appointment with Dr. Wilson -- Same day as labs, or 1-2 days later.     Insurance companies are now grading you and I on your blood sugar control -- Goal is to get your A1c down to 7.9% or lower and NO Smoking!    -- Medicare and most insurance companies, will only cover Hemoglobin A1c labs to be rechecked every 91+ days.      Hemoglobin A1C   Date Value Ref Range Status   01/15/2019 7.9 (H) 4.0  - 6.0 % Final   10/15/2018 7.5 (H) 4.0 - 6.0 % Final       Next follow-up appointment with Dr. Wilson should be scheduled:  -- Approximately a few days AFTER: 4/15/19       Moshe Wilson MD  Internal Medicine  [facility]     Portions of this note were dictated using speech recognition software. The note has been proofread but errors in the text may have been overlooked. Please contact me if there are any concerns regarding the accuracy of the dictation.

## 2019-01-15 NOTE — PATIENT INSTRUCTIONS
Blood pressure is well controlled.  Continue current medications.    Diabetes is controlled but just barely.      Results for orders placed or performed in visit on 01/15/19   Lipid Profile   Result Value Ref Range    Cholesterol 121 <200 mg/dL    Triglycerides 54 <150 mg/dL    HDL Cholesterol 40 23 - 92 mg/dL    LDL Cholesterol Calculated 70 <100 mg/dL    Non HDL Cholesterol 81 <130 mg/dL   Hemoglobin A1c   Result Value Ref Range    Hemoglobin A1C 7.9 (H) 4.0 - 6.0 %   Comprehensive metabolic panel   Result Value Ref Range    Sodium 140 134 - 144 mmol/L    Potassium 4.4 3.5 - 5.1 mmol/L    Chloride 104 98 - 107 mmol/L    Carbon Dioxide 28 21 - 31 mmol/L    Anion Gap 8 3 - 14 mmol/L    Glucose 114 (H) 70 - 105 mg/dL    Urea Nitrogen 11 7 - 25 mg/dL    Creatinine 0.85 0.70 - 1.30 mg/dL    GFR Estimate >90 >60 mL/min/[1.73_m2]    GFR Estimate If Black >90 >60 mL/min/[1.73_m2]    Calcium 10.0 8.6 - 10.3 mg/dL    Bilirubin Total 0.6 0.3 - 1.0 mg/dL    Albumin 4.6 3.5 - 5.7 g/dL    Protein Total 8.0 6.4 - 8.9 g/dL    Alkaline Phosphatase 82 34 - 104 U/L    ALT 18 7 - 52 U/L    AST 19 13 - 39 U/L   CBC with platelets   Result Value Ref Range    WBC 7.8 4.0 - 11.0 10e9/L    RBC Count 5.46 4.4 - 5.9 10e12/L    Hemoglobin 15.9 13.3 - 17.7 g/dL    Hematocrit 47.3 40.0 - 53.0 %    MCV 87 78 - 100 fl    MCH 29.1 26.5 - 33.0 pg    MCHC 33.6 31.5 - 36.5 g/dL    RDW 13.2 10.0 - 15.0 %    Platelet Count 231 150 - 450 10e9/L   *UA reflex to Microscopic   Result Value Ref Range    Color Urine Yellow     Appearance Urine Clear     Glucose Urine Negative NEG^Negative mg/dL    Bilirubin Urine Negative NEG^Negative    Ketones Urine Negative NEG^Negative mg/dL    Specific Gravity Urine 1.010 1.000 - 1.030    Blood Urine Negative NEG^Negative    pH Urine 8.0 5.0 - 9.0 pH    Protein Albumin Urine Negative NEG^Negative mg/dL    Urobilinogen Urine 0.2 0.2 - 1.0 EU/dL    Nitrite Urine Negative NEG^Negative    Leukocyte Esterase Urine Negative  NEG^Negative    Source Midstream Urine           To help with weight loss and improve blood sugar control....    -- Try to avoid Carbohydrates as much as possible -- breads, pasta, baked goods, cakes, oatmeal, cold cereal, potatoes.   These are turned to sugar in one metabolic conversion, cause insulin secretion and increased fat deposition / weight gain.      -- Eat more lean meats, proteins, eggs, nuts, vegetables.        Consider changing your evening snack over to peanuts or some type of protein.    Return for Diabetes labs and clinic follow-up appointment every 3 to 4 months.  --- (Go for about 91 to 100 days)    Aspects of Diabetes we can improve:  Hemoglobin A1c Lab Results   Component Value Date    A1C 7.9 01/15/2019    A1C 7.5 10/15/2018    A1C 7.9 07/09/2018    A1C 7.8 03/12/2018    Goal range is under 8. Best is 6.5 to 7   Blood Pressure 138/80 Goal to keep less than 140/90   Tobacco  reports that  has never smoked. he has never used smokeless tobacco. Goal to abstain from tobacco   Aspirin or Plavix  aspirin Aspirin or Plavix reduces risk of heart disease and stroke   ACE/ARB  losartan These medications reduce risk of kidney disease   Cholesterol  Lipitor Statins reduce risk of heart disease and stroke   Eye Exam -- Do Yearly -- Annual diabetic eye exam   Healthy weight Body mass index is 29.11 kg/m . Goal BMI under 30, best is under 25.      -- Trying to exercise daily (goal at least 20 min/day) with moderate aerobic activity   -- Eat healthy (resources from ADA at http://www.diabetes.org/)   -- Taking good care of my feet. Consider seeing the Podiatrist   -- Check blood sugars as directed, record in log book and bring to every appointment      Schedule lab only appointment --- A few days AFTER: 4/15/19   Schedule clinic appointment with Dr. Wilson -- Same day as labs, or 1-2 days later.     Insurance companies are now grading you and I on your blood sugar control -- Goal is to get your A1c down to  7.9% or lower and NO Smoking!    -- Medicare and most insurance companies, will only cover Hemoglobin A1c labs to be rechecked every 91+ days.      Hemoglobin A1C   Date Value Ref Range Status   01/15/2019 7.9 (H) 4.0 - 6.0 % Final   10/15/2018 7.5 (H) 4.0 - 6.0 % Final       Next follow-up appointment with Dr. Wilson should be scheduled:  -- Approximately a few days AFTER: 4/15/19

## 2019-01-15 NOTE — NURSING NOTE
"Patient presents to the clinic for diabetes management.        Previous A1C is at goal of <8  Lab Results   Component Value Date    A1C 7.9 01/15/2019    A1C 7.5 10/15/2018    A1C 7.9 07/09/2018    A1C 7.8 03/12/2018     Urine microalbumin:creatine: n/a  Foot exam this past year  Eye exam 03/2018    Tobacco User no  Patient is on a daily aspirin  Patient is on a Statin.  Blood pressure today of:     BP Readings from Last 1 Encounters:   10/15/18 134/88      is at the goal of <139/89 for diabetics.      Chief Complaint   Patient presents with     Diabetes       Initial /80 (BP Location: Right arm, Patient Position: Sitting, Cuff Size: Adult Regular)   Pulse 80   Ht 1.765 m (5' 9.5\")   Wt 90.7 kg (200 lb)   BMI 29.11 kg/m   Estimated body mass index is 29.11 kg/m  as calculated from the following:    Height as of this encounter: 1.765 m (5' 9.5\").    Weight as of this encounter: 90.7 kg (200 lb).  Medication Reconciliation: complete    Stephenie Varghese LPN      "

## 2019-01-16 ASSESSMENT — ANXIETY QUESTIONNAIRES: GAD7 TOTAL SCORE: 0

## 2019-02-02 DIAGNOSIS — E55.9 VITAMIN D DEFICIENCY: Primary | ICD-10-CM

## 2019-02-05 RX ORDER — ACETAMINOPHEN 160 MG
TABLET,DISINTEGRATING ORAL
Qty: 180 CAPSULE | Refills: 3 | Status: SHIPPED | OUTPATIENT
Start: 2019-02-05 | End: 2019-07-30

## 2019-04-16 ENCOUNTER — TELEPHONE (OUTPATIENT)
Dept: INTERNAL MEDICINE | Facility: OTHER | Age: 64
End: 2019-04-16

## 2019-04-16 DIAGNOSIS — E11.8 CONTROLLED TYPE 2 DIABETES MELLITUS WITH COMPLICATION, WITH LONG-TERM CURRENT USE OF INSULIN (H): ICD-10-CM

## 2019-04-16 DIAGNOSIS — I10 ESSENTIAL HYPERTENSION: Primary | ICD-10-CM

## 2019-04-16 DIAGNOSIS — Z79.4 CONTROLLED TYPE 2 DIABETES MELLITUS WITH COMPLICATION, WITH LONG-TERM CURRENT USE OF INSULIN (H): ICD-10-CM

## 2019-04-16 DIAGNOSIS — E78.2 MIXED HYPERLIPIDEMIA: ICD-10-CM

## 2019-04-16 NOTE — TELEPHONE ENCOUNTER
Contacted the patient he reported he is coming in on 4- for a A1C. Please place lab orders.  Bobbi Ford LPN on 4/16/2019 at 1:02 PM

## 2019-04-23 ENCOUNTER — OFFICE VISIT (OUTPATIENT)
Dept: INTERNAL MEDICINE | Facility: OTHER | Age: 64
End: 2019-04-23
Attending: INTERNAL MEDICINE
Payer: MEDICARE

## 2019-04-23 VITALS
RESPIRATION RATE: 16 BRPM | BODY MASS INDEX: 28.91 KG/M2 | DIASTOLIC BLOOD PRESSURE: 72 MMHG | TEMPERATURE: 97 F | SYSTOLIC BLOOD PRESSURE: 130 MMHG | WEIGHT: 198.6 LBS | HEART RATE: 60 BPM

## 2019-04-23 DIAGNOSIS — Z79.4 CONTROLLED TYPE 2 DIABETES MELLITUS WITH COMPLICATION, WITH LONG-TERM CURRENT USE OF INSULIN (H): ICD-10-CM

## 2019-04-23 DIAGNOSIS — E11.8 CONTROLLED TYPE 2 DIABETES MELLITUS WITH COMPLICATION, WITH LONG-TERM CURRENT USE OF INSULIN (H): Primary | ICD-10-CM

## 2019-04-23 DIAGNOSIS — E78.2 MIXED HYPERLIPIDEMIA: ICD-10-CM

## 2019-04-23 DIAGNOSIS — I10 ESSENTIAL HYPERTENSION: ICD-10-CM

## 2019-04-23 DIAGNOSIS — N18.2 CKD (CHRONIC KIDNEY DISEASE) STAGE 2, GFR 60-89 ML/MIN: ICD-10-CM

## 2019-04-23 DIAGNOSIS — E11.8 CONTROLLED TYPE 2 DIABETES MELLITUS WITH COMPLICATION, WITH LONG-TERM CURRENT USE OF INSULIN (H): ICD-10-CM

## 2019-04-23 DIAGNOSIS — Z79.4 CONTROLLED TYPE 2 DIABETES MELLITUS WITH COMPLICATION, WITH LONG-TERM CURRENT USE OF INSULIN (H): Primary | ICD-10-CM

## 2019-04-23 LAB
ALBUMIN SERPL-MCNC: 4.4 G/DL (ref 3.5–5.7)
ALBUMIN UR-MCNC: NEGATIVE MG/DL
ALP SERPL-CCNC: 71 U/L (ref 34–104)
ALT SERPL W P-5'-P-CCNC: 17 U/L (ref 7–52)
ANION GAP SERPL CALCULATED.3IONS-SCNC: 9 MMOL/L (ref 3–14)
APPEARANCE UR: CLEAR
AST SERPL W P-5'-P-CCNC: 18 U/L (ref 13–39)
BILIRUB SERPL-MCNC: 0.5 MG/DL (ref 0.3–1)
BILIRUB UR QL STRIP: NEGATIVE
BUN SERPL-MCNC: 17 MG/DL (ref 7–25)
CALCIUM SERPL-MCNC: 9.8 MG/DL (ref 8.6–10.3)
CHLORIDE SERPL-SCNC: 103 MMOL/L (ref 98–107)
CHOLEST SERPL-MCNC: 117 MG/DL
CO2 SERPL-SCNC: 26 MMOL/L (ref 21–31)
COLOR UR AUTO: YELLOW
CREAT SERPL-MCNC: 0.95 MG/DL (ref 0.7–1.3)
CREAT UR-MCNC: 32 MG/DL
ERYTHROCYTE [DISTWIDTH] IN BLOOD BY AUTOMATED COUNT: 13.2 % (ref 10–15)
GFR SERPL CREATININE-BSD FRML MDRD: 80 ML/MIN/{1.73_M2}
GLUCOSE SERPL-MCNC: 115 MG/DL (ref 70–105)
GLUCOSE UR STRIP-MCNC: NEGATIVE MG/DL
HBA1C MFR BLD: 7.1 % (ref 4–6)
HCT VFR BLD AUTO: 45.9 % (ref 40–53)
HDLC SERPL-MCNC: 37 MG/DL (ref 23–92)
HGB BLD-MCNC: 15.2 G/DL (ref 13.3–17.7)
HGB UR QL STRIP: NEGATIVE
KETONES UR STRIP-MCNC: NEGATIVE MG/DL
LDLC SERPL CALC-MCNC: 66 MG/DL
LEUKOCYTE ESTERASE UR QL STRIP: NEGATIVE
MCH RBC QN AUTO: 29.3 PG (ref 26.5–33)
MCHC RBC AUTO-ENTMCNC: 33.1 G/DL (ref 31.5–36.5)
MCV RBC AUTO: 89 FL (ref 78–100)
MICROALBUMIN UR-MCNC: 7 MG/L
MICROALBUMIN/CREAT UR: 20.79 MG/G CR (ref 0–17)
NITRATE UR QL: NEGATIVE
NONHDLC SERPL-MCNC: 80 MG/DL
PH UR STRIP: 5.5 PH (ref 5–9)
PLATELET # BLD AUTO: 218 10E9/L (ref 150–450)
POTASSIUM SERPL-SCNC: 4.4 MMOL/L (ref 3.5–5.1)
PROT SERPL-MCNC: 7.5 G/DL (ref 6.4–8.9)
RBC # BLD AUTO: 5.18 10E12/L (ref 4.4–5.9)
SODIUM SERPL-SCNC: 138 MMOL/L (ref 134–144)
SOURCE: NORMAL
SP GR UR STRIP: 1.01 (ref 1–1.03)
TRIGL SERPL-MCNC: 71 MG/DL
UROBILINOGEN UR STRIP-ACNC: 0.2 EU/DL (ref 0.2–1)
WBC # BLD AUTO: 7.3 10E9/L (ref 4–11)

## 2019-04-23 PROCEDURE — 80061 LIPID PANEL: CPT | Performed by: INTERNAL MEDICINE

## 2019-04-23 PROCEDURE — 36415 COLL VENOUS BLD VENIPUNCTURE: CPT | Performed by: INTERNAL MEDICINE

## 2019-04-23 PROCEDURE — 81003 URINALYSIS AUTO W/O SCOPE: CPT | Performed by: INTERNAL MEDICINE

## 2019-04-23 PROCEDURE — 80053 COMPREHEN METABOLIC PANEL: CPT | Performed by: INTERNAL MEDICINE

## 2019-04-23 PROCEDURE — 83036 HEMOGLOBIN GLYCOSYLATED A1C: CPT | Performed by: INTERNAL MEDICINE

## 2019-04-23 PROCEDURE — 85027 COMPLETE CBC AUTOMATED: CPT | Performed by: INTERNAL MEDICINE

## 2019-04-23 PROCEDURE — 82043 UR ALBUMIN QUANTITATIVE: CPT | Performed by: INTERNAL MEDICINE

## 2019-04-23 PROCEDURE — 99214 OFFICE O/P EST MOD 30 MIN: CPT | Performed by: INTERNAL MEDICINE

## 2019-04-23 ASSESSMENT — ENCOUNTER SYMPTOMS
LIGHT-HEADEDNESS: 0
WHEEZING: 0
SHORTNESS OF BREATH: 0
VOMITING: 0
NUMBNESS: 1
AGITATION: 0
CONFUSION: 0
EYE PAIN: 0
CHILLS: 0
ARTHRALGIAS: 0
ABDOMINAL PAIN: 0
DIARRHEA: 0
MYALGIAS: 0
NAUSEA: 0
DIZZINESS: 0
HEMATURIA: 0
FATIGUE: 0
PALPITATIONS: 0
DYSURIA: 0
COUGH: 0
BRUISES/BLEEDS EASILY: 0
FEVER: 0

## 2019-04-23 ASSESSMENT — PAIN SCALES - GENERAL: PAINLEVEL: SEVERE PAIN (6)

## 2019-04-23 NOTE — PATIENT INSTRUCTIONS
Labs are looking MUCH better!    Blood pressure is well controlled.     Keep up the hard work!    Results for orders placed or performed in visit on 04/23/19   Lipid Profile   Result Value Ref Range    Cholesterol 117 <200 mg/dL    Triglycerides 71 <150 mg/dL    HDL Cholesterol 37 23 - 92 mg/dL    LDL Cholesterol Calculated 66 <100 mg/dL    Non HDL Cholesterol 80 <130 mg/dL   Hemoglobin A1c   Result Value Ref Range    Hemoglobin A1C 7.1 (H) 4.0 - 6.0 %   CBC with platelets   Result Value Ref Range    WBC 7.3 4.0 - 11.0 10e9/L    RBC Count 5.18 4.4 - 5.9 10e12/L    Hemoglobin 15.2 13.3 - 17.7 g/dL    Hematocrit 45.9 40.0 - 53.0 %    MCV 89 78 - 100 fl    MCH 29.3 26.5 - 33.0 pg    MCHC 33.1 31.5 - 36.5 g/dL    RDW 13.2 10.0 - 15.0 %    Platelet Count 218 150 - 450 10e9/L   Comprehensive metabolic panel   Result Value Ref Range    Sodium 138 134 - 144 mmol/L    Potassium 4.4 3.5 - 5.1 mmol/L    Chloride 103 98 - 107 mmol/L    Carbon Dioxide 26 21 - 31 mmol/L    Anion Gap 9 3 - 14 mmol/L    Glucose 115 (H) 70 - 105 mg/dL    Urea Nitrogen 17 7 - 25 mg/dL    Creatinine 0.95 0.70 - 1.30 mg/dL    GFR Estimate 80 >60 mL/min/[1.73_m2]    GFR Estimate If Black >90 >60 mL/min/[1.73_m2]    Calcium 9.8 8.6 - 10.3 mg/dL    Bilirubin Total 0.5 0.3 - 1.0 mg/dL    Albumin 4.4 3.5 - 5.7 g/dL    Protein Total 7.5 6.4 - 8.9 g/dL    Alkaline Phosphatase 71 34 - 104 U/L    ALT 17 7 - 52 U/L    AST 18 13 - 39 U/L   *UA reflex to Microscopic   Result Value Ref Range    Color Urine Yellow     Appearance Urine Clear     Glucose Urine Negative NEG^Negative mg/dL    Bilirubin Urine Negative NEG^Negative    Ketones Urine Negative NEG^Negative mg/dL    Specific Gravity Urine 1.015 1.000 - 1.030    Blood Urine Negative NEG^Negative    pH Urine 5.5 5.0 - 9.0 pH    Protein Albumin Urine Negative NEG^Negative mg/dL    Urobilinogen Urine 0.2 0.2 - 1.0 EU/dL    Nitrite Urine Negative NEG^Negative    Leukocyte Esterase Urine Negative NEG^Negative     Source Midstream Urine       Return for Diabetes labs and clinic follow-up appointment every 3 to 4 months.  --- (Go for about 91 to 100 days)    Aspects of Diabetes we can improve:  Hemoglobin A1c Lab Results   Component Value Date    A1C 7.1 04/23/2019    A1C 7.9 01/15/2019    A1C 7.5 10/15/2018    A1C 7.9 07/09/2018    A1C 7.8 03/12/2018    Goal range is under 8. Best is 6.5 to 7   Blood Pressure 130/72 Goal to keep less than 140/90   Tobacco  reports that he has never smoked. He has never used smokeless tobacco. Goal to abstain from tobacco   Eye Exam -- Do Yearly -- Annual diabetic eye exam   Healthy weight Body mass index is 28.91 kg/m . Goal BMI under 30, best is under 25.      -- Trying to exercise daily (goal at least 20 min/day) with moderate aerobic activity   -- Eat healthy (resources from ADA at http://www.diabetes.org/)   -- Taking good care of my feet. Consider seeing the Podiatrist   -- Check blood sugars as directed, record in log book and bring to every appointment      Schedule lab only appointment --- A few days AFTER: 7/22/19   Schedule clinic appointment with Dr. Wilson -- Same day as labs, or 1-2 days later.     Insurance companies are now grading you and I on your blood sugar control -- Goal is to get your A1c down to 7.9% or lower and NO Smoking!    -- Medicare and most insurance companies, will only cover Hemoglobin A1c labs to be rechecked every 91+ days.      Hemoglobin A1C   Date Value Ref Range Status   04/23/2019 7.1 (H) 4.0 - 6.0 % Final   01/15/2019 7.9 (H) 4.0 - 6.0 % Final       Next follow-up appointment with Dr. Wilson should be scheduled:  -- Approximately a few days AFTER: 7/22/19

## 2019-04-23 NOTE — PROGRESS NOTES
Nursing Notes:   Mickie Merrill LPN  4/23/2019  9:05 AM  Signed  Chief Complaint   Patient presents with     RECHECK     diabetic   Patient presents to the clinic for a diabetic recheck  Previous A1C is at goal of <8  Lab Results   Component Value Date    A1C 7.1 04/23/2019    A1C 7.9 01/15/2019    A1C 7.5 10/15/2018    A1C 7.9 07/09/2018    A1C 7.8 03/12/2018     Urine microalbumin:creatine: na  Foot exam yearly  Eye exam 3/26/18    Tobacco User no  Patient is on a daily aspirin  Patient is on a Statin.  Blood pressure today of:     BP Readings from Last 1 Encounters:   04/23/19 130/72      is at the goal of <139/89 for diabetics.    Mickie Merrill LPN on 4/23/2019 at 8:53 AM      Medication Reconciliation: completed   Mickie Merrill LPN  4/23/2019 8:48 AM   Nursing note reviewed with patient.  Accuracy and completeness verified.   Mr. Delarosa is a 64 year old male who:  Patient presents with:  RECHECK: diabetic      ICD-10-CM    1. Controlled type 2 diabetes mellitus with complication, with long-term current use of insulin (H) E11.8 insulin glargine (LANTUS SOLOSTAR PEN) 100 UNIT/ML pen    Z79.4    2. Mixed hyperlipidemia E78.2    3. Essential hypertension I10    4. CKD (chronic kidney disease) stage 2, GFR 60-89 ml/min N18.2      HPI  Controlled type 2 diabetes.  His last hemoglobin A1c was 7.9, size.  Blood sugars have come down.  Tolerating current medication regimen.  No hypoglycemia reported.  Continue current dosing.  Needs insulin refills.  Prescription sent to pharmacy.    Mixed hyperlipidemia, improved with Lipitor.  Tolerating well.  No side effects reported.  Continue current dosing.    Hypertension, currently well controlled.  Blood pressure was 130/72.  No side effects reported.  Continue losartan.    Stage II chronic kidney disease, creatinine did go up slightly today, otherwise essentially at baseline.  Advised to avoid NSAIDs.    Functional Capacity: > 4 METS.   Review of Systems   Constitutional:  Negative for chills, fatigue and fever.   HENT: Negative for congestion and hearing loss.    Eyes: Negative for pain and visual disturbance.   Respiratory: Negative for cough, shortness of breath and wheezing.    Cardiovascular: Negative for chest pain and palpitations.   Gastrointestinal: Negative for abdominal pain, diarrhea, nausea and vomiting.   Endocrine: Negative for cold intolerance and heat intolerance.   Genitourinary: Negative for dysuria and hematuria.   Musculoskeletal: Negative for arthralgias and myalgias.   Skin: Negative for pallor.   Allergic/Immunologic: Negative for immunocompromised state.   Neurological: Positive for numbness (+ bilateral feet, comes and goes -- better with better glucose control). Negative for dizziness and light-headedness.   Hematological: Does not bruise/bleed easily.   Psychiatric/Behavioral: Negative for agitation and confusion.        SHIRLENE:   SHIRLENE-7 SCORE 7/10/2018 10/15/2018 1/15/2019   Total Score 0 0 0     PHQ9:  PHQ-9 SCORE 7/10/2018 10/15/2018 1/15/2019   PHQ-9 Total Score 0 0 0       I have personally reviewed the past medical history, past surgical history, medications, allergies, family and social history as listed below.     Allergies   Allergen Reactions     Lisinopril Cough       Current Outpatient Medications   Medication Sig Dispense Refill     aspirin (GOODSENSE ASPIRIN) 325 MG tablet Take 325 mg by mouth daily with food       atorvastatin (LIPITOR) 40 MG tablet Take 1 tablet (40 mg) by mouth daily 90 tablet 3     blood glucose monitoring (NO BRAND SPECIFIED) test strip 1 strip by In Vitro route 4 times daily Use to test blood sugars 4 times daily or as directed-okay to substitute for insurance coverage 400 each 3     Blood Glucose Monitoring Suppl (GLUCOCOM BLOOD GLUCOSE MONITOR) JACKSON Dispense glucose meter, test strips and lancets covered by the patient insurance. Test 4 times per day.       Calcium Carb-Cholecalciferol (CALCIUM 500 +D PO) Take 2 tablets by  mouth daily       Cholecalciferol (VITAMIN D3) 2000 units CAPS TAKE 2 CAPSULES BY MOUTH DAILY FOR VITAMIN D DEFICIENCY 180 capsule 3     glipiZIDE (GLUCOTROL) 10 MG tablet Take 1 tablet (10 mg) by mouth 2 times daily (before meals) 180 tablet 3     glipiZIDE-metFORMIN (METAGLIP) 2.5-250 MG per tablet Take 1 tablet by mouth daily 90 tablet 3     insulin glargine (LANTUS SOLOSTAR PEN) 100 UNIT/ML pen Inject 15-25 units subcutaneously before bedtime. 18 mL 5     insulin pen needle (BD MARJORIE U/F) 32G X 4 MM AS DIRECTED FOR ADMINISTERING INSULIN AT HOME. FOR USE WITH LANTUS INJECTIONS ONCE DAILY 100 each 3     losartan (COZAAR) 50 MG tablet Take 1 tablet (50 mg) by mouth daily 90 tablet 3     metFORMIN (GLUCOPHAGE) 1000 MG tablet Take 1 tablet (1,000 mg) by mouth 2 times daily (with meals) 180 tablet 3        Patient Active Problem List    Diagnosis Date Noted     CKD (chronic kidney disease) stage 2, GFR 60-89 ml/min 10/15/2018     Priority: Medium     Mixed hyperlipidemia 03/06/2018     Priority: Medium     Essential hypertension 03/06/2018     Priority: Medium     Hereditary and idiopathic peripheral neuropathy 01/29/2018     Priority: Medium     Controlled type 2 diabetes mellitus with complication, with long-term current use of insulin (H) 10/25/2016     Priority: Medium     Chronic back pain 04/30/2015     Priority: Medium     Leiomyoma of the skin 04/29/2015     Priority: Medium     Actinic keratosis of right temple 12/17/2014     Priority: Medium     Strain of flexor muscle of right hip 12/17/2014     Priority: Medium     History of nephrolithiasis 06/24/2014     Priority: Medium     H/O hyperparathyroidism 04/10/2014     Priority: Medium     Increased PTH level 04/10/2014     Priority: Medium     S/P subtotal parathyroidectomy (H) 04/10/2014     Priority: Medium     Vitamin D deficiency 04/10/2014     Priority: Medium     Diabetic retinopathy associated with type 2 diabetes mellitus (H) 11/25/2013     Priority:  Medium     Lung nodule 2013     Priority: Medium     Hematuria 2012     Priority: Medium     Past Medical History:   Diagnosis Date     Other ehrlichiosis     2007     Unspecified injury of left wrist, hand and finger(s), initial encounter     scaphoid bruise     Past Surgical History:   Procedure Laterality Date     COLONOSCOPY  2017     OTHER SURGICAL HISTORY      ,ARS680,PARATHYROIDECTOMY     TONSILLECTOMY      age 5     Social History     Socioeconomic History     Marital status:      Spouse name: None     Number of children: None     Years of education: None     Highest education level: None   Occupational History     None   Social Needs     Financial resource strain: None     Food insecurity:     Worry: None     Inability: None     Transportation needs:     Medical: None     Non-medical: None   Tobacco Use     Smoking status: Never Smoker     Smokeless tobacco: Never Used   Substance and Sexual Activity     Alcohol use: No     Drug use: No     Sexual activity: Never   Lifestyle     Physical activity:     Days per week: None     Minutes per session: None     Stress: None   Relationships     Social connections:     Talks on phone: None     Gets together: None     Attends Mandaeism service: None     Active member of club or organization: None     Attends meetings of clubs or organizations: None     Relationship status: None     Intimate partner violence:     Fear of current or ex partner: None     Emotionally abused: None     Physically abused: None     Forced sexual activity: None   Other Topics Concern     Parent/sibling w/ CABG, MI or angioplasty before 65F 55M? Not Asked   Social History Narrative    Never a smoker.  Walks regularly,      Family History   Problem Relation Age of Onset     Heart Disease Father         Heart Disease, at age 73, CAD, complications of percutaneous revascularization of abdominal aortic aneurysm     Family History Negative Mother          "Good Health,83     Substance Abuse Brother         Alcohol/Drug,Alcoholism       EXAM:   Vitals:    04/23/19 0850   BP: 130/72   BP Location: Right arm   Patient Position: Sitting   Cuff Size: Adult Regular   Pulse: 60   Resp: 16   Temp: 97  F (36.1  C)   TempSrc: Tympanic   Weight: 90.1 kg (198 lb 9.6 oz)       Current Pain Score: Severe Pain (6)     BP Readings from Last 3 Encounters:   04/23/19 130/72   01/15/19 138/80   10/15/18 134/88      Wt Readings from Last 3 Encounters:   04/23/19 90.1 kg (198 lb 9.6 oz)   01/15/19 90.7 kg (200 lb)   10/15/18 90.3 kg (199 lb 2 oz)      Estimated body mass index is 28.91 kg/m  as calculated from the following:    Height as of 1/15/19: 1.765 m (5' 9.5\").    Weight as of this encounter: 90.1 kg (198 lb 9.6 oz).     Physical Exam   Constitutional: He appears well-developed and well-nourished. No distress.   HENT:   Head: Normocephalic and atraumatic.   Eyes: Conjunctivae and EOM are normal. No scleral icterus.   Neck: No thyromegaly present.   Cardiovascular: Normal rate and regular rhythm.   Pulmonary/Chest: Effort normal. No respiratory distress. He has no wheezes.   Abdominal: Soft. There is no tenderness.   Musculoskeletal: He exhibits no tenderness or deformity.   Lymphadenopathy:     He has no cervical adenopathy.   Neurological: He is alert. No cranial nerve deficit.   Skin: Skin is warm and dry.   Psychiatric: He has a normal mood and affect.        Procedures   INVESTIGATIONS:  -- see below.     ASSESSMENT AND PLAN:  Problem List Items Addressed This Visit        Endocrine    Controlled type 2 diabetes mellitus with complication, with long-term current use of insulin (H) - Primary    Relevant Medications    insulin glargine (LANTUS SOLOSTAR PEN) 100 UNIT/ML pen    Mixed hyperlipidemia       Circulatory    Essential hypertension       Urinary    CKD (chronic kidney disease) stage 2, GFR 60-89 ml/min        reviewed diet, exercise and weight control, recommended sodium " restriction, cardiovascular risk and specific lipid/LDL goals reviewed, specific diabetic recommendations low cholesterol diet, weight control and daily exercise discussed, use of aspirin to prevent MI and TIA's discussed  -- Expected clinical course discussed    -- Medications and their side effects discussed    Patient Instructions     Labs are looking MUCH better!    Blood pressure is well controlled.     Keep up the hard work!    Results for orders placed or performed in visit on 04/23/19   Lipid Profile   Result Value Ref Range    Cholesterol 117 <200 mg/dL    Triglycerides 71 <150 mg/dL    HDL Cholesterol 37 23 - 92 mg/dL    LDL Cholesterol Calculated 66 <100 mg/dL    Non HDL Cholesterol 80 <130 mg/dL   Hemoglobin A1c   Result Value Ref Range    Hemoglobin A1C 7.1 (H) 4.0 - 6.0 %   CBC with platelets   Result Value Ref Range    WBC 7.3 4.0 - 11.0 10e9/L    RBC Count 5.18 4.4 - 5.9 10e12/L    Hemoglobin 15.2 13.3 - 17.7 g/dL    Hematocrit 45.9 40.0 - 53.0 %    MCV 89 78 - 100 fl    MCH 29.3 26.5 - 33.0 pg    MCHC 33.1 31.5 - 36.5 g/dL    RDW 13.2 10.0 - 15.0 %    Platelet Count 218 150 - 450 10e9/L   Comprehensive metabolic panel   Result Value Ref Range    Sodium 138 134 - 144 mmol/L    Potassium 4.4 3.5 - 5.1 mmol/L    Chloride 103 98 - 107 mmol/L    Carbon Dioxide 26 21 - 31 mmol/L    Anion Gap 9 3 - 14 mmol/L    Glucose 115 (H) 70 - 105 mg/dL    Urea Nitrogen 17 7 - 25 mg/dL    Creatinine 0.95 0.70 - 1.30 mg/dL    GFR Estimate 80 >60 mL/min/[1.73_m2]    GFR Estimate If Black >90 >60 mL/min/[1.73_m2]    Calcium 9.8 8.6 - 10.3 mg/dL    Bilirubin Total 0.5 0.3 - 1.0 mg/dL    Albumin 4.4 3.5 - 5.7 g/dL    Protein Total 7.5 6.4 - 8.9 g/dL    Alkaline Phosphatase 71 34 - 104 U/L    ALT 17 7 - 52 U/L    AST 18 13 - 39 U/L   *UA reflex to Microscopic   Result Value Ref Range    Color Urine Yellow     Appearance Urine Clear     Glucose Urine Negative NEG^Negative mg/dL    Bilirubin Urine Negative NEG^Negative     Ketones Urine Negative NEG^Negative mg/dL    Specific Gravity Urine 1.015 1.000 - 1.030    Blood Urine Negative NEG^Negative    pH Urine 5.5 5.0 - 9.0 pH    Protein Albumin Urine Negative NEG^Negative mg/dL    Urobilinogen Urine 0.2 0.2 - 1.0 EU/dL    Nitrite Urine Negative NEG^Negative    Leukocyte Esterase Urine Negative NEG^Negative    Source Midstream Urine       Return for Diabetes labs and clinic follow-up appointment every 3 to 4 months.  --- (Go for about 91 to 100 days)    Aspects of Diabetes we can improve:  Hemoglobin A1c Lab Results   Component Value Date    A1C 7.1 04/23/2019    A1C 7.9 01/15/2019    A1C 7.5 10/15/2018    A1C 7.9 07/09/2018    A1C 7.8 03/12/2018    Goal range is under 8. Best is 6.5 to 7   Blood Pressure 130/72 Goal to keep less than 140/90   Tobacco  reports that he has never smoked. He has never used smokeless tobacco. Goal to abstain from tobacco   Eye Exam -- Do Yearly -- Annual diabetic eye exam   Healthy weight Body mass index is 28.91 kg/m . Goal BMI under 30, best is under 25.      -- Trying to exercise daily (goal at least 20 min/day) with moderate aerobic activity   -- Eat healthy (resources from ADA at http://www.diabetes.org/)   -- Taking good care of my feet. Consider seeing the Podiatrist   -- Check blood sugars as directed, record in log book and bring to every appointment      Schedule lab only appointment --- A few days AFTER: 7/22/19   Schedule clinic appointment with Dr. Wilson -- Same day as labs, or 1-2 days later.     Insurance companies are now grading you and I on your blood sugar control -- Goal is to get your A1c down to 7.9% or lower and NO Smoking!    -- Medicare and most insurance companies, will only cover Hemoglobin A1c labs to be rechecked every 91+ days.      Hemoglobin A1C   Date Value Ref Range Status   04/23/2019 7.1 (H) 4.0 - 6.0 % Final   01/15/2019 7.9 (H) 4.0 - 6.0 % Final       Next follow-up appointment with Dr. Wilson should be scheduled:  --  Approximately a few days AFTER: 7/22/19        Moshe Wilson MD  Internal Medicine  [facility]     Portions of this note were dictated using speech recognition software. The note has been proofread but errors in the text may have been overlooked. Please contact me if there are any concerns regarding the accuracy of the dictation.

## 2019-04-23 NOTE — NURSING NOTE
Chief Complaint   Patient presents with     RECHECK     diabetic   Patient presents to the clinic for a diabetic recheck  Previous A1C is at goal of <8  Lab Results   Component Value Date    A1C 7.1 04/23/2019    A1C 7.9 01/15/2019    A1C 7.5 10/15/2018    A1C 7.9 07/09/2018    A1C 7.8 03/12/2018     Urine microalbumin:creatine: na  Foot exam yearly  Eye exam 3/26/18    Tobacco User no  Patient is on a daily aspirin  Patient is on a Statin.  Blood pressure today of:     BP Readings from Last 1 Encounters:   04/23/19 130/72      is at the goal of <139/89 for diabetics.    Mickie Merrill LPN on 4/23/2019 at 8:53 AM      Medication Reconciliation: completed   Mickie Merrill LPN  4/23/2019 8:48 AM

## 2019-07-29 NOTE — PROGRESS NOTES
"Nursing Notes:   Stephenie Varghese LPN  7/30/2019  8:27 AM  Signed  Patient presents to the clinic for diabetes management.      Previous A1C is at goal of <8  Lab Results   Component Value Date    A1C 7.3 07/30/2019    A1C 7.1 04/23/2019    A1C 7.9 01/15/2019    A1C 7.5 10/15/2018    A1C 7.9 07/09/2018     Urine microalbumin:creatine: n/a  Foot exam this past year-declines today  Eye exam 03/2018    Tobacco User no  Patient is on a daily aspirin  Patient is on a Statin.  Blood pressure today of:     BP Readings from Last 1 Encounters:   04/23/19 130/72      is at the goal of <139/89 for diabetics.      Chief Complaint   Patient presents with     Diabetes       Initial BP (!) 142/84 (BP Location: Right arm, Patient Position: Sitting, Cuff Size: Adult Regular)   Pulse 80   Temp 96.8  F (36  C) (Tympanic)   Resp 16   Wt 90.3 kg (199 lb 2 oz)   BMI 28.98 kg/m    Estimated body mass index is 28.98 kg/m  as calculated from the following:    Height as of 1/15/19: 1.765 m (5' 9.5\").    Weight as of this encounter: 90.3 kg (199 lb 2 oz).  Medication Reconciliation: complete    Stephenie Varghese LPN      Nursing note reviewed with patient.  Accuracy and completeness verified.   Mr. Delarosa is a 64 year old male who:  Patient presents with:  Diabetes      ICD-10-CM    1. Controlled type 2 diabetes mellitus with complication, with long-term current use of insulin (H) E11.8 blood glucose (NO BRAND SPECIFIED) test strip    Z79.4 glipiZIDE (GLUCOTROL) 10 MG tablet     glipiZIDE-metFORMIN (METAGLIP) 2.5-250 MG tablet     insulin glargine (LANTUS SOLOSTAR) 100 UNIT/ML pen     insulin pen needle (BD MARJORIE U/F) 32G X 4 MM miscellaneous     metFORMIN (GLUCOPHAGE) 1000 MG tablet   2. Essential hypertension I10 losartan (COZAAR) 50 MG tablet   3. CKD (chronic kidney disease) stage 2, GFR 60-89 ml/min N18.2    4. Mixed hyperlipidemia E78.2 atorvastatin (LIPITOR) 40 MG tablet   5. Vitamin D deficiency E55.9 Cholecalciferol (VITAMIN D3) " 2000 units CAPS     HPI  Type 2 diabetes, currently insulin controlled.  Doing well with diabetes management.  No hypoglycemia.  No side effects.  Tolerating current medication regimen well.  Continue current dosing.  Needs refills.  A1c checked today, previously 7.1, now 7.3%.    Hypertension, currently well controlled.  Tolerating current medication regimen.  No side effects reported.  Continue current dosing.    Stage II chronic kidney disease, has been stable.  Creatinine improved previously.  Recheck today.  Advised to avoid NSAID use.    Mixed hyperlipidemia, doing well with Lipitor.  No side effects reported.  Continue current dosing.  Labs today.    Vitamin D deficiency, taking oral replacement.  Tolerating well.  Needs refills.    Functional Capacity: > 4 METS.   Review of Systems   Constitutional: Negative for chills, fatigue and fever.   HENT: Negative for congestion and hearing loss.    Eyes: Negative for pain and visual disturbance.   Respiratory: Negative for cough, shortness of breath and wheezing.    Cardiovascular: Negative for chest pain and palpitations.   Gastrointestinal: Negative for abdominal pain, diarrhea, nausea and vomiting.   Endocrine: Negative for cold intolerance and heat intolerance.   Genitourinary: Negative for dysuria and hematuria.   Musculoskeletal: Negative for arthralgias and myalgias.   Skin: Negative for pallor.   Allergic/Immunologic: Negative for immunocompromised state.   Neurological: Positive for numbness (+ bilateral feet, comes and goes -- better with better glucose control). Negative for dizziness and light-headedness.   Hematological: Does not bruise/bleed easily.   Psychiatric/Behavioral: Negative for agitation and confusion.      SHIRLENE:   SHIRLENE-7 SCORE 10/15/2018 1/15/2019 7/30/2019   Total Score 0 0 0     PHQ9:  PHQ-9 SCORE 10/15/2018 1/15/2019 7/30/2019   PHQ-9 Total Score 0 0 0       I have personally reviewed the past medical history, past surgical history,  medications, allergies, family and social history as listed below.     Allergies   Allergen Reactions     Lisinopril Cough       Current Outpatient Medications   Medication Sig Dispense Refill     aspirin (GOODSENSE ASPIRIN) 325 MG tablet Take 325 mg by mouth daily with food       atorvastatin (LIPITOR) 40 MG tablet Take 1 tablet (40 mg) by mouth daily 90 tablet 3     blood glucose (NO BRAND SPECIFIED) test strip 1 strip by In Vitro route 4 times daily Use to test blood sugars 4 times daily -okay to substitute for insurance coverage 400 each 3     Blood Glucose Monitoring Suppl (GLUCOCOM BLOOD GLUCOSE MONITOR) JACKSON Dispense glucose meter, test strips and lancets covered by the patient insurance. Test 4 times per day.       Calcium Carb-Cholecalciferol (CALCIUM 500 +D PO) Take 2 tablets by mouth daily       Cholecalciferol (VITAMIN D3) 2000 units CAPS TAKE 2 CAPSULES BY MOUTH DAILY FOR VITAMIN D DEFICIENCY 180 capsule 3     glipiZIDE (GLUCOTROL) 10 MG tablet Take 1 tablet (10 mg) by mouth 2 times daily (before meals) 180 tablet 3     glipiZIDE-metFORMIN (METAGLIP) 2.5-250 MG tablet Take 1 tablet by mouth daily 90 tablet 3     insulin glargine (LANTUS SOLOSTAR) 100 UNIT/ML pen Inject 15-25 units subcutaneously before bedtime. 18 mL 5     insulin pen needle (BD MARJORIE U/F) 32G X 4 MM miscellaneous AS DIRECTED FOR ADMINISTERING INSULIN AT HOME. FOR USE WITH LANTUS INJECTIONS ONCE DAILY 200 each 3     losartan (COZAAR) 50 MG tablet Take 1 tablet (50 mg) by mouth daily 90 tablet 3     metFORMIN (GLUCOPHAGE) 1000 MG tablet Take 1 tablet (1,000 mg) by mouth 2 times daily (with meals) 180 tablet 3        Patient Active Problem List    Diagnosis Date Noted     CKD (chronic kidney disease) stage 2, GFR 60-89 ml/min 10/15/2018     Priority: Medium     Mixed hyperlipidemia 03/06/2018     Priority: Medium     Essential hypertension 03/06/2018     Priority: Medium     Hereditary and idiopathic peripheral neuropathy 01/29/2018      Priority: Medium     Controlled type 2 diabetes mellitus with complication, with long-term current use of insulin (H) 10/25/2016     Priority: Medium     Chronic back pain 04/30/2015     Priority: Medium     Leiomyoma of the skin 04/29/2015     Priority: Medium     Actinic keratosis of right temple 12/17/2014     Priority: Medium     Strain of flexor muscle of right hip 12/17/2014     Priority: Medium     History of nephrolithiasis 06/24/2014     Priority: Medium     H/O hyperparathyroidism 04/10/2014     Priority: Medium     Increased PTH level 04/10/2014     Priority: Medium     S/P subtotal parathyroidectomy (H) 04/10/2014     Priority: Medium     Vitamin D deficiency 04/10/2014     Priority: Medium     Diabetic retinopathy associated with type 2 diabetes mellitus (H) 11/25/2013     Priority: Medium     Lung nodule 11/25/2013     Priority: Medium     Hematuria 07/18/2012     Priority: Medium     Past Medical History:   Diagnosis Date     Other ehrlichiosis     11/2007     Unspecified injury of left wrist, hand and finger(s), initial encounter     scaphoid bruise     Past Surgical History:   Procedure Laterality Date     COLONOSCOPY  11/28/2017     OTHER SURGICAL HISTORY      01/02,CQV931,PARATHYROIDECTOMY     TONSILLECTOMY      age 5     Social History     Socioeconomic History     Marital status:      Spouse name: None     Number of children: None     Years of education: None     Highest education level: None   Occupational History     None   Social Needs     Financial resource strain: None     Food insecurity:     Worry: None     Inability: None     Transportation needs:     Medical: None     Non-medical: None   Tobacco Use     Smoking status: Never Smoker     Smokeless tobacco: Never Used   Substance and Sexual Activity     Alcohol use: No     Drug use: No     Sexual activity: Never   Lifestyle     Physical activity:     Days per week: None     Minutes per session: None     Stress: None   Relationships  "    Social connections:     Talks on phone: None     Gets together: None     Attends Sikh service: None     Active member of club or organization: None     Attends meetings of clubs or organizations: None     Relationship status: None     Intimate partner violence:     Fear of current or ex partner: None     Emotionally abused: None     Physically abused: None     Forced sexual activity: None   Other Topics Concern     Parent/sibling w/ CABG, MI or angioplasty before 65F 55M? Not Asked   Social History Narrative    Never a smoker.  Walks regularly,      Family History   Problem Relation Age of Onset     Heart Disease Father         Heart Disease, at age 73, CAD, complications of percutaneous revascularization of abdominal aortic aneurysm     Family History Negative Mother         Good Health,83     Substance Abuse Brother         Alcohol/Drug,Alcoholism       EXAM:   Vitals:    19 0816   BP: 138/84   BP Location: Right arm   Patient Position: Sitting   Cuff Size: Adult Regular   Pulse: 80   Resp: 16   Temp: 96.8  F (36  C)   TempSrc: Tympanic   Weight: 90.3 kg (199 lb 2 oz)       Current Pain Score: Severe Pain (7)     BP Readings from Last 3 Encounters:   19 138/84   19 130/72   01/15/19 138/80      Wt Readings from Last 3 Encounters:   19 90.3 kg (199 lb 2 oz)   19 90.1 kg (198 lb 9.6 oz)   01/15/19 90.7 kg (200 lb)      Estimated body mass index is 28.98 kg/m  as calculated from the following:    Height as of 1/15/19: 1.765 m (5' 9.5\").    Weight as of this encounter: 90.3 kg (199 lb 2 oz).     Physical Exam   Constitutional: He appears well-developed and well-nourished. No distress.   HENT:   Head: Normocephalic and atraumatic.   Eyes: Conjunctivae and EOM are normal. No scleral icterus.   Neck: No thyromegaly present.   Cardiovascular: Normal rate and regular rhythm.   Pulmonary/Chest: Effort normal. No respiratory distress. He has no wheezes.   Abdominal: Soft. " There is no tenderness.   Musculoskeletal: He exhibits no tenderness or deformity.   Lymphadenopathy:     He has no cervical adenopathy.   Neurological: He is alert. No cranial nerve deficit.   Skin: Skin is warm and dry.   Psychiatric: He has a normal mood and affect.      Procedures   INVESTIGATIONS:  -- see below.     ASSESSMENT AND PLAN:  Problem List Items Addressed This Visit        Digestive    Vitamin D deficiency    Relevant Medications    Cholecalciferol (VITAMIN D3) 2000 units CAPS       Endocrine    Controlled type 2 diabetes mellitus with complication, with long-term current use of insulin (H) - Primary    Relevant Medications    blood glucose (NO BRAND SPECIFIED) test strip    glipiZIDE (GLUCOTROL) 10 MG tablet    glipiZIDE-metFORMIN (METAGLIP) 2.5-250 MG tablet    insulin glargine (LANTUS SOLOSTAR) 100 UNIT/ML pen    insulin pen needle (BD MARJORIE U/F) 32G X 4 MM miscellaneous    metFORMIN (GLUCOPHAGE) 1000 MG tablet    Mixed hyperlipidemia    Relevant Medications    atorvastatin (LIPITOR) 40 MG tablet       Circulatory    Essential hypertension    Relevant Medications    losartan (COZAAR) 50 MG tablet       Urinary    CKD (chronic kidney disease) stage 2, GFR 60-89 ml/min        reviewed diet, exercise and weight control, cardiovascular risk and specific lipid/LDL goals reviewed, specific diabetic recommendations low cholesterol diet, weight control and daily exercise discussed, use of aspirin to prevent MI and TIA's discussed  -- Expected clinical course discussed    -- Medications and their side effects discussed    Patient Instructions     Medications refilled.     Diabetes and blood pressure are well controlled.     Results for orders placed or performed in visit on 07/30/19   Hemoglobin A1c   Result Value Ref Range    Hemoglobin A1C 7.3 (H) 4.0 - 6.0 %   CBC with platelets   Result Value Ref Range    WBC 8.2 4.0 - 11.0 10e9/L    RBC Count 5.32 4.4 - 5.9 10e12/L    Hemoglobin 15.7 13.3 - 17.7 g/dL     Hematocrit 46.6 40.0 - 53.0 %    MCV 88 78 - 100 fl    MCH 29.5 26.5 - 33.0 pg    MCHC 33.7 31.5 - 36.5 g/dL    RDW 13.2 10.0 - 15.0 %    Platelet Count 224 150 - 450 10e9/L   *UA reflex to Microscopic   Result Value Ref Range    Color Urine Yellow     Appearance Urine Clear     Glucose Urine Negative NEG^Negative mg/dL    Bilirubin Urine Negative NEG^Negative    Ketones Urine Negative NEG^Negative mg/dL    Specific Gravity Urine 1.010 1.000 - 1.030    Blood Urine Negative NEG^Negative    pH Urine 6.5 5.0 - 9.0 pH    Protein Albumin Urine Negative NEG^Negative mg/dL    Urobilinogen Urine 0.2 0.2 - 1.0 EU/dL    Nitrite Urine Negative NEG^Negative    Leukocyte Esterase Urine Negative NEG^Negative    Source Midstream Urine       Return for Diabetes labs and clinic follow-up appointment every 3 to 4 months.  --- (Go for about 91 to 100 days)    Aspects of Diabetes we can improve:  Hemoglobin A1c Lab Results   Component Value Date    A1C 7.3 07/30/2019    A1C 7.1 04/23/2019    A1C 7.9 01/15/2019    A1C 7.5 10/15/2018    A1C 7.9 07/09/2018    Goal range is under 8. Best is 6.5 to 7   Blood Pressure 138/84 Goal to keep less than 140/90   Tobacco  reports that he has never smoked. He has never used smokeless tobacco. Goal to abstain from tobacco   Eye Exam -- Do Yearly -- Annual diabetic eye exam   Healthy weight Body mass index is 28.98 kg/m . Goal BMI under 30, best is under 25.      -- Trying to exercise daily (goal at least 20 min/day) with moderate aerobic activity   -- Eat healthy (resources from ADA at http://www.diabetes.org/)   -- Taking good care of my feet. Consider seeing the Podiatrist   -- Check blood sugars as directed, record in log book and bring to every appointment      Schedule lab only appointment --- A few days AFTER: 10/28/19   Schedule clinic appointment with Dr. Wilson -- Same day as labs, or 1-2 days later.     Insurance companies are now grading you and I on your blood sugar control -- Goal is  to get your A1c down to 7.9% or lower and NO Smoking!    -- Medicare and most insurance companies, will only cover Hemoglobin A1c labs to be rechecked every 91+ days.      Hemoglobin A1C   Date Value Ref Range Status   07/30/2019 7.3 (H) 4.0 - 6.0 % Final   04/23/2019 7.1 (H) 4.0 - 6.0 % Final       Next follow-up appointment with Dr. Wilson should be scheduled:  -- Approximately a few days AFTER: 10/28/19       Moshe Wilson MD  Internal Medicine  [facility]     Portions of this note were dictated using speech recognition software. The note has been proofread but errors in the text may have been overlooked. Please contact me if there are any concerns regarding the accuracy of the dictation.

## 2019-07-30 ENCOUNTER — OFFICE VISIT (OUTPATIENT)
Dept: INTERNAL MEDICINE | Facility: OTHER | Age: 64
End: 2019-07-30
Attending: INTERNAL MEDICINE
Payer: MEDICARE

## 2019-07-30 VITALS
TEMPERATURE: 96.8 F | HEART RATE: 80 BPM | WEIGHT: 199.13 LBS | SYSTOLIC BLOOD PRESSURE: 138 MMHG | BODY MASS INDEX: 28.98 KG/M2 | RESPIRATION RATE: 16 BRPM | DIASTOLIC BLOOD PRESSURE: 84 MMHG

## 2019-07-30 DIAGNOSIS — I10 ESSENTIAL HYPERTENSION: ICD-10-CM

## 2019-07-30 DIAGNOSIS — N18.2 CKD (CHRONIC KIDNEY DISEASE) STAGE 2, GFR 60-89 ML/MIN: ICD-10-CM

## 2019-07-30 DIAGNOSIS — E55.9 VITAMIN D DEFICIENCY: ICD-10-CM

## 2019-07-30 DIAGNOSIS — E11.8 CONTROLLED TYPE 2 DIABETES MELLITUS WITH COMPLICATION, WITH LONG-TERM CURRENT USE OF INSULIN (H): ICD-10-CM

## 2019-07-30 DIAGNOSIS — E78.2 MIXED HYPERLIPIDEMIA: ICD-10-CM

## 2019-07-30 DIAGNOSIS — Z79.4 CONTROLLED TYPE 2 DIABETES MELLITUS WITH COMPLICATION, WITH LONG-TERM CURRENT USE OF INSULIN (H): Primary | ICD-10-CM

## 2019-07-30 DIAGNOSIS — E11.8 CONTROLLED TYPE 2 DIABETES MELLITUS WITH COMPLICATION, WITH LONG-TERM CURRENT USE OF INSULIN (H): Primary | ICD-10-CM

## 2019-07-30 DIAGNOSIS — Z79.4 CONTROLLED TYPE 2 DIABETES MELLITUS WITH COMPLICATION, WITH LONG-TERM CURRENT USE OF INSULIN (H): ICD-10-CM

## 2019-07-30 LAB
ALBUMIN SERPL-MCNC: 4.5 G/DL (ref 3.5–5.7)
ALBUMIN UR-MCNC: NEGATIVE MG/DL
ALP SERPL-CCNC: 79 U/L (ref 34–104)
ALT SERPL W P-5'-P-CCNC: 16 U/L (ref 7–52)
ANION GAP SERPL CALCULATED.3IONS-SCNC: 6 MMOL/L (ref 3–14)
APPEARANCE UR: CLEAR
AST SERPL W P-5'-P-CCNC: 17 U/L (ref 13–39)
BILIRUB SERPL-MCNC: 0.5 MG/DL (ref 0.3–1)
BILIRUB UR QL STRIP: NEGATIVE
BUN SERPL-MCNC: 13 MG/DL (ref 7–25)
CALCIUM SERPL-MCNC: 9.6 MG/DL (ref 8.6–10.3)
CHLORIDE SERPL-SCNC: 103 MMOL/L (ref 98–107)
CHOLEST SERPL-MCNC: 115 MG/DL
CO2 SERPL-SCNC: 30 MMOL/L (ref 21–31)
COLOR UR AUTO: YELLOW
CREAT SERPL-MCNC: 0.95 MG/DL (ref 0.7–1.3)
CREAT UR-MCNC: 31 MG/DL
ERYTHROCYTE [DISTWIDTH] IN BLOOD BY AUTOMATED COUNT: 13.2 % (ref 10–15)
GFR SERPL CREATININE-BSD FRML MDRD: 80 ML/MIN/{1.73_M2}
GLUCOSE SERPL-MCNC: 148 MG/DL (ref 70–105)
GLUCOSE UR STRIP-MCNC: NEGATIVE MG/DL
HBA1C MFR BLD: 7.3 % (ref 4–6)
HCT VFR BLD AUTO: 46.6 % (ref 40–53)
HDLC SERPL-MCNC: 39 MG/DL (ref 23–92)
HGB BLD-MCNC: 15.7 G/DL (ref 13.3–17.7)
HGB UR QL STRIP: NEGATIVE
KETONES UR STRIP-MCNC: NEGATIVE MG/DL
LDLC SERPL CALC-MCNC: 65 MG/DL
LEUKOCYTE ESTERASE UR QL STRIP: NEGATIVE
MCH RBC QN AUTO: 29.5 PG (ref 26.5–33)
MCHC RBC AUTO-ENTMCNC: 33.7 G/DL (ref 31.5–36.5)
MCV RBC AUTO: 88 FL (ref 78–100)
MICROALBUMIN UR-MCNC: 9 MG/L
MICROALBUMIN/CREAT UR: 30.06 MG/G CR (ref 0–17)
NITRATE UR QL: NEGATIVE
NONHDLC SERPL-MCNC: 76 MG/DL
PH UR STRIP: 6.5 PH (ref 5–9)
PLATELET # BLD AUTO: 224 10E9/L (ref 150–450)
POTASSIUM SERPL-SCNC: 4.4 MMOL/L (ref 3.5–5.1)
PROT SERPL-MCNC: 7.4 G/DL (ref 6.4–8.9)
RBC # BLD AUTO: 5.32 10E12/L (ref 4.4–5.9)
SODIUM SERPL-SCNC: 139 MMOL/L (ref 134–144)
SOURCE: NORMAL
SP GR UR STRIP: 1.01 (ref 1–1.03)
TRIGL SERPL-MCNC: 57 MG/DL
UROBILINOGEN UR STRIP-ACNC: 0.2 EU/DL (ref 0.2–1)
WBC # BLD AUTO: 8.2 10E9/L (ref 4–11)

## 2019-07-30 PROCEDURE — 85027 COMPLETE CBC AUTOMATED: CPT | Performed by: INTERNAL MEDICINE

## 2019-07-30 PROCEDURE — 81003 URINALYSIS AUTO W/O SCOPE: CPT | Performed by: INTERNAL MEDICINE

## 2019-07-30 PROCEDURE — 80061 LIPID PANEL: CPT | Performed by: INTERNAL MEDICINE

## 2019-07-30 PROCEDURE — 82043 UR ALBUMIN QUANTITATIVE: CPT | Performed by: INTERNAL MEDICINE

## 2019-07-30 PROCEDURE — 80053 COMPREHEN METABOLIC PANEL: CPT | Performed by: INTERNAL MEDICINE

## 2019-07-30 PROCEDURE — 36415 COLL VENOUS BLD VENIPUNCTURE: CPT | Performed by: INTERNAL MEDICINE

## 2019-07-30 PROCEDURE — 83036 HEMOGLOBIN GLYCOSYLATED A1C: CPT | Performed by: INTERNAL MEDICINE

## 2019-07-30 PROCEDURE — G0463 HOSPITAL OUTPT CLINIC VISIT: HCPCS

## 2019-07-30 PROCEDURE — 99214 OFFICE O/P EST MOD 30 MIN: CPT | Performed by: INTERNAL MEDICINE

## 2019-07-30 RX ORDER — LOSARTAN POTASSIUM 50 MG/1
50 TABLET ORAL DAILY
Qty: 90 TABLET | Refills: 3 | Status: SHIPPED | OUTPATIENT
Start: 2019-07-30 | End: 2020-10-05

## 2019-07-30 RX ORDER — ATORVASTATIN CALCIUM 40 MG/1
40 TABLET, FILM COATED ORAL DAILY
Qty: 90 TABLET | Refills: 3 | Status: SHIPPED | OUTPATIENT
Start: 2019-07-30 | End: 2020-06-24

## 2019-07-30 RX ORDER — GLIPIZIDE AND METFORMIN HCL 2.5; 25 MG/1; MG/1
1 TABLET, FILM COATED ORAL DAILY
Qty: 90 TABLET | Refills: 3 | Status: SHIPPED | OUTPATIENT
Start: 2019-07-30 | End: 2021-04-09

## 2019-07-30 RX ORDER — ACETAMINOPHEN 160 MG
TABLET,DISINTEGRATING ORAL
Qty: 180 CAPSULE | Refills: 3 | Status: SHIPPED | OUTPATIENT
Start: 2019-07-30 | End: 2020-08-25

## 2019-07-30 RX ORDER — GLIPIZIDE 10 MG/1
10 TABLET ORAL
Qty: 180 TABLET | Refills: 3 | Status: SHIPPED | OUTPATIENT
Start: 2019-07-30 | End: 2020-10-05

## 2019-07-30 ASSESSMENT — ANXIETY QUESTIONNAIRES
3. WORRYING TOO MUCH ABOUT DIFFERENT THINGS: NOT AT ALL
7. FEELING AFRAID AS IF SOMETHING AWFUL MIGHT HAPPEN: NOT AT ALL
IF YOU CHECKED OFF ANY PROBLEMS ON THIS QUESTIONNAIRE, HOW DIFFICULT HAVE THESE PROBLEMS MADE IT FOR YOU TO DO YOUR WORK, TAKE CARE OF THINGS AT HOME, OR GET ALONG WITH OTHER PEOPLE: NOT DIFFICULT AT ALL
2. NOT BEING ABLE TO STOP OR CONTROL WORRYING: NOT AT ALL
GAD7 TOTAL SCORE: 0
1. FEELING NERVOUS, ANXIOUS, OR ON EDGE: NOT AT ALL
5. BEING SO RESTLESS THAT IT IS HARD TO SIT STILL: NOT AT ALL
6. BECOMING EASILY ANNOYED OR IRRITABLE: NOT AT ALL

## 2019-07-30 ASSESSMENT — ENCOUNTER SYMPTOMS
EYE PAIN: 0
LIGHT-HEADEDNESS: 0
AGITATION: 0
DYSURIA: 0
NAUSEA: 0
CONFUSION: 0
ABDOMINAL PAIN: 0
FATIGUE: 0
MYALGIAS: 0
DIARRHEA: 0
DIZZINESS: 0
COUGH: 0
ARTHRALGIAS: 0
FEVER: 0
HEMATURIA: 0
BRUISES/BLEEDS EASILY: 0
NUMBNESS: 1
VOMITING: 0
CHILLS: 0
SHORTNESS OF BREATH: 0
WHEEZING: 0
PALPITATIONS: 0

## 2019-07-30 ASSESSMENT — PATIENT HEALTH QUESTIONNAIRE - PHQ9
5. POOR APPETITE OR OVEREATING: NOT AT ALL
SUM OF ALL RESPONSES TO PHQ QUESTIONS 1-9: 0

## 2019-07-30 ASSESSMENT — PAIN SCALES - GENERAL: PAINLEVEL: SEVERE PAIN (7)

## 2019-07-30 NOTE — PATIENT INSTRUCTIONS
Medications refilled.     Diabetes and blood pressure are well controlled.     Results for orders placed or performed in visit on 07/30/19   Hemoglobin A1c   Result Value Ref Range    Hemoglobin A1C 7.3 (H) 4.0 - 6.0 %   CBC with platelets   Result Value Ref Range    WBC 8.2 4.0 - 11.0 10e9/L    RBC Count 5.32 4.4 - 5.9 10e12/L    Hemoglobin 15.7 13.3 - 17.7 g/dL    Hematocrit 46.6 40.0 - 53.0 %    MCV 88 78 - 100 fl    MCH 29.5 26.5 - 33.0 pg    MCHC 33.7 31.5 - 36.5 g/dL    RDW 13.2 10.0 - 15.0 %    Platelet Count 224 150 - 450 10e9/L   *UA reflex to Microscopic   Result Value Ref Range    Color Urine Yellow     Appearance Urine Clear     Glucose Urine Negative NEG^Negative mg/dL    Bilirubin Urine Negative NEG^Negative    Ketones Urine Negative NEG^Negative mg/dL    Specific Gravity Urine 1.010 1.000 - 1.030    Blood Urine Negative NEG^Negative    pH Urine 6.5 5.0 - 9.0 pH    Protein Albumin Urine Negative NEG^Negative mg/dL    Urobilinogen Urine 0.2 0.2 - 1.0 EU/dL    Nitrite Urine Negative NEG^Negative    Leukocyte Esterase Urine Negative NEG^Negative    Source Midstream Urine       Return for Diabetes labs and clinic follow-up appointment every 3 to 4 months.  --- (Go for about 91 to 100 days)    Aspects of Diabetes we can improve:  Hemoglobin A1c Lab Results   Component Value Date    A1C 7.3 07/30/2019    A1C 7.1 04/23/2019    A1C 7.9 01/15/2019    A1C 7.5 10/15/2018    A1C 7.9 07/09/2018    Goal range is under 8. Best is 6.5 to 7   Blood Pressure 138/84 Goal to keep less than 140/90   Tobacco  reports that he has never smoked. He has never used smokeless tobacco. Goal to abstain from tobacco   Eye Exam -- Do Yearly -- Annual diabetic eye exam   Healthy weight Body mass index is 28.98 kg/m . Goal BMI under 30, best is under 25.      -- Trying to exercise daily (goal at least 20 min/day) with moderate aerobic activity   -- Eat healthy (resources from ADA at http://www.diabetes.org/)   -- Taking good care of my  feet. Consider seeing the Podiatrist   -- Check blood sugars as directed, record in log book and bring to every appointment      Schedule lab only appointment --- A few days AFTER: 10/28/19   Schedule clinic appointment with Dr. Wilson -- Same day as labs, or 1-2 days later.     Insurance companies are now grading you and I on your blood sugar control -- Goal is to get your A1c down to 7.9% or lower and NO Smoking!    -- Medicare and most insurance companies, will only cover Hemoglobin A1c labs to be rechecked every 91+ days.      Hemoglobin A1C   Date Value Ref Range Status   07/30/2019 7.3 (H) 4.0 - 6.0 % Final   04/23/2019 7.1 (H) 4.0 - 6.0 % Final       Next follow-up appointment with Dr. Wilson should be scheduled:  -- Approximately a few days AFTER: 10/28/19

## 2019-07-30 NOTE — NURSING NOTE
"Patient presents to the clinic for diabetes management.      Previous A1C is at goal of <8  Lab Results   Component Value Date    A1C 7.3 07/30/2019    A1C 7.1 04/23/2019    A1C 7.9 01/15/2019    A1C 7.5 10/15/2018    A1C 7.9 07/09/2018     Urine microalbumin:creatine: n/a  Foot exam this past year-declines today  Eye exam 03/2018    Tobacco User no  Patient is on a daily aspirin  Patient is on a Statin.  Blood pressure today of:     BP Readings from Last 1 Encounters:   04/23/19 130/72      is at the goal of <139/89 for diabetics.      Chief Complaint   Patient presents with     Diabetes       Initial BP (!) 142/84 (BP Location: Right arm, Patient Position: Sitting, Cuff Size: Adult Regular)   Pulse 80   Temp 96.8  F (36  C) (Tympanic)   Resp 16   Wt 90.3 kg (199 lb 2 oz)   BMI 28.98 kg/m   Estimated body mass index is 28.98 kg/m  as calculated from the following:    Height as of 1/15/19: 1.765 m (5' 9.5\").    Weight as of this encounter: 90.3 kg (199 lb 2 oz).  Medication Reconciliation: complete    Stephenie Varghese LPN      "

## 2019-07-31 ASSESSMENT — ANXIETY QUESTIONNAIRES: GAD7 TOTAL SCORE: 0

## 2019-11-04 NOTE — PROGRESS NOTES
"Nursing Notes:   Stephenie Varghese LPN  11/6/2019  8:29 AM  Signed  Patient presents to the clinic for diabetes management.      Previous A1C is at goal of <8  Lab Results   Component Value Date    A1C 7.4 11/06/2019    A1C 7.3 07/30/2019    A1C 7.1 04/23/2019    A1C 7.9 01/15/2019    A1C 7.5 10/15/2018     Urine microalbumin:creatine: n/a  Foot exam this past year-declines today  Eye exam 03/2018    Tobacco User no  Patient is on a daily aspirin  Patient is on a Statin.  Blood pressure today of:     BP Readings from Last 1 Encounters:   07/30/19 138/84      is at the goal of <139/89 for diabetics.    Chief Complaint   Patient presents with     Diabetes       Initial /60 (BP Location: Right arm, Patient Position: Sitting, Cuff Size: Adult Regular)   Pulse 88   Temp 96.9  F (36.1  C) (Tympanic)   Resp 16   Wt 89.6 kg (197 lb 8 oz)   BMI 28.75 kg/m    Estimated body mass index is 28.75 kg/m  as calculated from the following:    Height as of 1/15/19: 1.765 m (5' 9.5\").    Weight as of this encounter: 89.6 kg (197 lb 8 oz).  Medication Reconciliation: complete    Stephenie Varghese LPN          Nursing note reviewed with patient.  Accuracy and completeness verified.   Mr. Delarosa is a 64 year old male who:  Patient presents with:  Diabetes  Imm/Inj: Flu Shot      ICD-10-CM    1. Controlled type 2 diabetes mellitus with complication, with long-term current use of insulin (H) E11.8     Z79.4    2. CKD (chronic kidney disease) stage 2, GFR 60-89 ml/min N18.2    3. Mixed hyperlipidemia E78.2    4. Benign essential hypertension I10    5. Need for prophylactic vaccination and inoculation against influenza Z23 INFLUENZA VACCINE IM > 6 MONTHS VALENT IIV4 [57551]     HPI  Type 2 diabetes, currently well controlled.  Tolerating current medication regimen including glipizide metformin and Lantus insulin.  No low blood sugar issues.  Has been trying to watch his diet and keep his weight stable.  No changes today.    Stage II " chronic kidney disease, doing well.  Stopped drinking soda.  Encouraged NSAID avoidance.    Mixed hyperlipidemia, well controlled with Lipitor 40 mg daily.  No side effects reported.  Continue current medications.    Hypertension, well controlled with current medication regimen.  No low blood pressure issues.  Continue losartan 50 mg daily.    Shot today    Functional Capacity: > 4 METS.   Review of Systems   Constitutional: Negative for chills, fatigue and fever.   HENT: Negative for congestion and hearing loss.    Eyes: Negative for pain and visual disturbance.   Respiratory: Negative for cough, shortness of breath and wheezing.    Cardiovascular: Negative for chest pain and palpitations.   Gastrointestinal: Negative for abdominal pain, diarrhea, nausea and vomiting.   Endocrine: Negative for cold intolerance and heat intolerance.   Genitourinary: Negative for dysuria and hematuria.   Musculoskeletal: Negative for arthralgias and myalgias.   Skin: Negative for pallor.   Allergic/Immunologic: Negative for immunocompromised state.   Neurological: Positive for numbness (+ bilateral feet, comes and goes -- better with better glucose control). Negative for dizziness and light-headedness.   Hematological: Does not bruise/bleed easily.   Psychiatric/Behavioral: Negative for agitation and confusion.        Problem List/PMH: reviewed in EMR, and made relevant updates today.  Medications: reviewed in EMR, and made relevant updates today.  Allergies: reviewed in EMR, and made relevant updates today.  I reviewed family and social history and made relevant updates today.  Social History     Tobacco Use     Smoking status: Never Smoker     Smokeless tobacco: Never Used   Substance Use Topics     Alcohol use: No     Drug use: No      Family History   Problem Relation Age of Onset     Heart Disease Father         Heart Disease, at age 73, CAD, complications of percutaneous revascularization of abdominal aortic aneurysm      "Family History Negative Mother         Good Health,83     Substance Abuse Brother         Alcohol/Drug,Alcoholism       EXAM:   Vitals:    11/06/19 0811   BP: 126/60   BP Location: Right arm   Patient Position: Sitting   Cuff Size: Adult Regular   Pulse: 88   Resp: 16   Temp: 96.9  F (36.1  C)   TempSrc: Tympanic   Weight: 89.6 kg (197 lb 8 oz)       Current Pain Score: Severe Pain (6)     BP Readings from Last 3 Encounters:   11/06/19 126/60   07/30/19 138/84   04/23/19 130/72      Wt Readings from Last 3 Encounters:   11/06/19 89.6 kg (197 lb 8 oz)   07/30/19 90.3 kg (199 lb 2 oz)   04/23/19 90.1 kg (198 lb 9.6 oz)      Estimated body mass index is 28.75 kg/m  as calculated from the following:    Height as of 1/15/19: 1.765 m (5' 9.5\").    Weight as of this encounter: 89.6 kg (197 lb 8 oz).     Physical Exam  Constitutional:       General: He is not in acute distress.     Appearance: He is well-developed. He is not diaphoretic.   HENT:      Head: Normocephalic and atraumatic.   Eyes:      General: No scleral icterus.     Conjunctiva/sclera: Conjunctivae normal.   Neck:      Musculoskeletal: Neck supple.      Thyroid: No thyromegaly.   Cardiovascular:      Rate and Rhythm: Normal rate and regular rhythm.   Pulmonary:      Effort: Pulmonary effort is normal. No respiratory distress.      Breath sounds: Normal breath sounds. No wheezing.   Abdominal:      Palpations: Abdomen is soft.      Tenderness: There is no tenderness.   Musculoskeletal:         General: No tenderness or deformity.   Lymphadenopathy:      Cervical: No cervical adenopathy.   Skin:     General: Skin is warm and dry.      Findings: No rash.   Neurological:      General: No focal deficit present.      Mental Status: He is alert.      Cranial Nerves: No cranial nerve deficit.   Psychiatric:         Mood and Affect: Mood normal.         Behavior: Behavior normal.          Procedures   INVESTIGATIONS:  -- see below.     ASSESSMENT AND PLAN:  Problem " List Items Addressed This Visit        Endocrine    Controlled type 2 diabetes mellitus with complication, with long-term current use of insulin (H) - Primary    Mixed hyperlipidemia       Circulatory    Benign essential hypertension       Urinary    CKD (chronic kidney disease) stage 2, GFR 60-89 ml/min      Other Visit Diagnoses     Need for prophylactic vaccination and inoculation against influenza        Relevant Orders    INFLUENZA VACCINE IM > 6 MONTHS VALENT IIV4 [59182] (Completed)        reviewed diet, exercise and weight control, recommended sodium restriction, cardiovascular risk and specific lipid/LDL goals reviewed, specific diabetic recommendations low cholesterol diet, weight control and daily exercise discussed, use of aspirin to prevent MI and TIA's discussed  -- Expected clinical course discussed    -- Medications and their side effects discussed    Patient Instructions     Diabetes is well controlled!  Blood pressure is well controlled.     No changes today.     Flu shot today.     Results for orders placed or performed in visit on 11/06/19   Lipid Profile     Status: None   Result Value Ref Range    Cholesterol 111 <200 mg/dL    Triglycerides 77 <150 mg/dL    HDL Cholesterol 41 23 - 92 mg/dL    LDL Cholesterol Calculated 55 <100 mg/dL    Non HDL Cholesterol 70 <130 mg/dL   Hemoglobin A1c     Status: Abnormal   Result Value Ref Range    Hemoglobin A1C 7.4 (H) 4.0 - 6.0 %   CBC with platelets     Status: None   Result Value Ref Range    WBC 9.0 4.0 - 11.0 10e9/L    RBC Count 5.16 4.4 - 5.9 10e12/L    Hemoglobin 15.5 13.3 - 17.7 g/dL    Hematocrit 45.8 40.0 - 53.0 %    MCV 89 78 - 100 fl    MCH 30.0 26.5 - 33.0 pg    MCHC 33.8 31.5 - 36.5 g/dL    RDW 13.0 10.0 - 15.0 %    Platelet Count 256 150 - 450 10e9/L   Comprehensive metabolic panel     Status: None   Result Value Ref Range    Sodium 138 134 - 144 mmol/L    Potassium 4.5 3.5 - 5.1 mmol/L    Chloride 102 98 - 107 mmol/L    Carbon Dioxide 28 21  - 31 mmol/L    Anion Gap 8 3 - 14 mmol/L    Glucose 103 70 - 105 mg/dL    Urea Nitrogen 14 7 - 25 mg/dL    Creatinine 0.92 0.70 - 1.30 mg/dL    GFR Estimate 83 >60 mL/min/[1.73_m2]    GFR Estimate If Black >90 >60 mL/min/[1.73_m2]    Calcium 9.7 8.6 - 10.3 mg/dL    Bilirubin Total 0.6 0.3 - 1.0 mg/dL    Albumin 4.5 3.5 - 5.7 g/dL    Protein Total 8.2 6.4 - 8.9 g/dL    Alkaline Phosphatase 78 34 - 104 U/L    ALT 20 7 - 52 U/L    AST 24 13 - 39 U/L   *UA reflex to Microscopic     Status: None   Result Value Ref Range    Color Urine Yellow     Appearance Urine Clear     Glucose Urine Negative NEG^Negative mg/dL    Bilirubin Urine Negative NEG^Negative    Ketones Urine Negative NEG^Negative mg/dL    Specific Gravity Urine 1.010 1.000 - 1.030    Blood Urine Negative NEG^Negative    pH Urine 7.0 5.0 - 9.0 pH    Protein Albumin Urine Negative NEG^Negative mg/dL    Urobilinogen Urine 0.2 0.2 - 1.0 EU/dL    Nitrite Urine Negative NEG^Negative    Leukocyte Esterase Urine Negative NEG^Negative    Source Midstream Urine       Return for Diabetes labs and clinic follow-up appointment every 3 to 4 months.  --- (Go for about 91 to 100 days)    Aspects of Diabetes we can improve:  Hemoglobin A1c Lab Results   Component Value Date    A1C 7.4 11/06/2019    A1C 7.3 07/30/2019    A1C 7.1 04/23/2019    A1C 7.9 01/15/2019    A1C 7.5 10/15/2018    Goal range is under 8. Best is 6.5 to 7   Blood Pressure 126/60 Goal to keep less than 140/90   Tobacco  reports that he has never smoked. He has never used smokeless tobacco. Goal to abstain from tobacco   Eye Exam -- Do Yearly -- Annual diabetic eye exam   Healthy weight Body mass index is 28.75 kg/m . Goal BMI under 30, best is under 25.      -- Trying to exercise daily (goal at least 20 min/day) with moderate aerobic activity   -- Eat healthy (resources from ADA at http://www.diabetes.org/)   -- Taking good care of my feet. Consider seeing the Podiatrist   -- Check blood sugars as directed,  record in log book and bring to every appointment      Schedule lab only appointment --- A few days AFTER: 2/4/20   Schedule clinic appointment with Dr. Wilson -- Same day as labs, or 1-2 days later.     Insurance companies are now grading you and I on your blood sugar control -- Goal is to get your A1c down to 7.9% or lower and NO Smoking!    -- Medicare and most insurance companies, will only cover Hemoglobin A1c labs to be rechecked every 91+ days.      Hemoglobin A1C   Date Value Ref Range Status   11/06/2019 7.4 (H) 4.0 - 6.0 % Final   07/30/2019 7.3 (H) 4.0 - 6.0 % Final       Next follow-up appointment with Dr. Wilson should be scheduled:  -- Approximately a few days AFTER: 2/4/20      Moshe Wilson MD  Internal Medicine  [facility]     Portions of this note were dictated using speech recognition software. The note has been proofread but errors in the text may have been overlooked. Please contact me if there are any concerns regarding the accuracy of the dictation.

## 2019-11-06 ENCOUNTER — OFFICE VISIT (OUTPATIENT)
Dept: INTERNAL MEDICINE | Facility: OTHER | Age: 64
End: 2019-11-06
Attending: INTERNAL MEDICINE
Payer: MEDICARE

## 2019-11-06 VITALS
SYSTOLIC BLOOD PRESSURE: 126 MMHG | TEMPERATURE: 96.9 F | BODY MASS INDEX: 28.75 KG/M2 | RESPIRATION RATE: 16 BRPM | HEART RATE: 88 BPM | DIASTOLIC BLOOD PRESSURE: 60 MMHG | WEIGHT: 197.5 LBS

## 2019-11-06 DIAGNOSIS — N18.2 CKD (CHRONIC KIDNEY DISEASE) STAGE 2, GFR 60-89 ML/MIN: ICD-10-CM

## 2019-11-06 DIAGNOSIS — E11.8 CONTROLLED TYPE 2 DIABETES MELLITUS WITH COMPLICATION, WITH LONG-TERM CURRENT USE OF INSULIN (H): ICD-10-CM

## 2019-11-06 DIAGNOSIS — Z79.4 CONTROLLED TYPE 2 DIABETES MELLITUS WITH COMPLICATION, WITH LONG-TERM CURRENT USE OF INSULIN (H): ICD-10-CM

## 2019-11-06 DIAGNOSIS — Z23 NEED FOR PROPHYLACTIC VACCINATION AND INOCULATION AGAINST INFLUENZA: ICD-10-CM

## 2019-11-06 DIAGNOSIS — Z79.4 CONTROLLED TYPE 2 DIABETES MELLITUS WITH COMPLICATION, WITH LONG-TERM CURRENT USE OF INSULIN (H): Primary | ICD-10-CM

## 2019-11-06 DIAGNOSIS — E78.2 MIXED HYPERLIPIDEMIA: ICD-10-CM

## 2019-11-06 DIAGNOSIS — I10 BENIGN ESSENTIAL HYPERTENSION: ICD-10-CM

## 2019-11-06 DIAGNOSIS — I10 ESSENTIAL HYPERTENSION: ICD-10-CM

## 2019-11-06 DIAGNOSIS — E11.8 CONTROLLED TYPE 2 DIABETES MELLITUS WITH COMPLICATION, WITH LONG-TERM CURRENT USE OF INSULIN (H): Primary | ICD-10-CM

## 2019-11-06 LAB
ALBUMIN SERPL-MCNC: 4.5 G/DL (ref 3.5–5.7)
ALBUMIN UR-MCNC: NEGATIVE MG/DL
ALP SERPL-CCNC: 78 U/L (ref 34–104)
ALT SERPL W P-5'-P-CCNC: 20 U/L (ref 7–52)
ANION GAP SERPL CALCULATED.3IONS-SCNC: 8 MMOL/L (ref 3–14)
APPEARANCE UR: CLEAR
AST SERPL W P-5'-P-CCNC: 24 U/L (ref 13–39)
BILIRUB SERPL-MCNC: 0.6 MG/DL (ref 0.3–1)
BILIRUB UR QL STRIP: NEGATIVE
BUN SERPL-MCNC: 14 MG/DL (ref 7–25)
CALCIUM SERPL-MCNC: 9.7 MG/DL (ref 8.6–10.3)
CHLORIDE SERPL-SCNC: 102 MMOL/L (ref 98–107)
CHOLEST SERPL-MCNC: 111 MG/DL
CO2 SERPL-SCNC: 28 MMOL/L (ref 21–31)
COLOR UR AUTO: YELLOW
CREAT SERPL-MCNC: 0.92 MG/DL (ref 0.7–1.3)
CREAT UR-MCNC: 61 MG/DL
ERYTHROCYTE [DISTWIDTH] IN BLOOD BY AUTOMATED COUNT: 13 % (ref 10–15)
GFR SERPL CREATININE-BSD FRML MDRD: 83 ML/MIN/{1.73_M2}
GLUCOSE SERPL-MCNC: 103 MG/DL (ref 70–105)
GLUCOSE UR STRIP-MCNC: NEGATIVE MG/DL
HBA1C MFR BLD: 7.4 % (ref 4–6)
HCT VFR BLD AUTO: 45.8 % (ref 40–53)
HDLC SERPL-MCNC: 41 MG/DL (ref 23–92)
HGB BLD-MCNC: 15.5 G/DL (ref 13.3–17.7)
HGB UR QL STRIP: NEGATIVE
KETONES UR STRIP-MCNC: NEGATIVE MG/DL
LDLC SERPL CALC-MCNC: 55 MG/DL
LEUKOCYTE ESTERASE UR QL STRIP: NEGATIVE
MCH RBC QN AUTO: 30 PG (ref 26.5–33)
MCHC RBC AUTO-ENTMCNC: 33.8 G/DL (ref 31.5–36.5)
MCV RBC AUTO: 89 FL (ref 78–100)
MICROALBUMIN UR-MCNC: <5 MG/L
MICROALBUMIN/CREAT UR: NORMAL MG/G CR (ref 0–17)
NITRATE UR QL: NEGATIVE
NONHDLC SERPL-MCNC: 70 MG/DL
PH UR STRIP: 7 PH (ref 5–9)
PLATELET # BLD AUTO: 256 10E9/L (ref 150–450)
POTASSIUM SERPL-SCNC: 4.5 MMOL/L (ref 3.5–5.1)
PROT SERPL-MCNC: 8.2 G/DL (ref 6.4–8.9)
RBC # BLD AUTO: 5.16 10E12/L (ref 4.4–5.9)
SODIUM SERPL-SCNC: 138 MMOL/L (ref 134–144)
SOURCE: NORMAL
SP GR UR STRIP: 1.01 (ref 1–1.03)
TRIGL SERPL-MCNC: 77 MG/DL
UROBILINOGEN UR STRIP-ACNC: 0.2 EU/DL (ref 0.2–1)
WBC # BLD AUTO: 9 10E9/L (ref 4–11)

## 2019-11-06 PROCEDURE — 99213 OFFICE O/P EST LOW 20 MIN: CPT | Performed by: INTERNAL MEDICINE

## 2019-11-06 PROCEDURE — 83036 HEMOGLOBIN GLYCOSYLATED A1C: CPT | Mod: ZL | Performed by: INTERNAL MEDICINE

## 2019-11-06 PROCEDURE — 82043 UR ALBUMIN QUANTITATIVE: CPT | Mod: ZL | Performed by: INTERNAL MEDICINE

## 2019-11-06 PROCEDURE — G0463 HOSPITAL OUTPT CLINIC VISIT: HCPCS

## 2019-11-06 PROCEDURE — 80053 COMPREHEN METABOLIC PANEL: CPT | Mod: ZL | Performed by: INTERNAL MEDICINE

## 2019-11-06 PROCEDURE — 81003 URINALYSIS AUTO W/O SCOPE: CPT | Mod: ZL | Performed by: INTERNAL MEDICINE

## 2019-11-06 PROCEDURE — 85027 COMPLETE CBC AUTOMATED: CPT | Mod: ZL | Performed by: INTERNAL MEDICINE

## 2019-11-06 PROCEDURE — 80061 LIPID PANEL: CPT | Mod: ZL | Performed by: INTERNAL MEDICINE

## 2019-11-06 PROCEDURE — G0008 ADMIN INFLUENZA VIRUS VAC: HCPCS

## 2019-11-06 PROCEDURE — 90686 IIV4 VACC NO PRSV 0.5 ML IM: CPT

## 2019-11-06 PROCEDURE — G0463 HOSPITAL OUTPT CLINIC VISIT: HCPCS | Mod: 25

## 2019-11-06 PROCEDURE — 36415 COLL VENOUS BLD VENIPUNCTURE: CPT | Mod: ZL | Performed by: INTERNAL MEDICINE

## 2019-11-06 ASSESSMENT — ENCOUNTER SYMPTOMS
COUGH: 0
NUMBNESS: 1
CONFUSION: 0
LIGHT-HEADEDNESS: 0
NAUSEA: 0
DIARRHEA: 0
BRUISES/BLEEDS EASILY: 0
DIZZINESS: 0
AGITATION: 0
FEVER: 0
CHILLS: 0
FATIGUE: 0
MYALGIAS: 0
VOMITING: 0
ARTHRALGIAS: 0
HEMATURIA: 0
DYSURIA: 0
WHEEZING: 0
ABDOMINAL PAIN: 0
EYE PAIN: 0
SHORTNESS OF BREATH: 0
PALPITATIONS: 0

## 2019-11-06 ASSESSMENT — ANXIETY QUESTIONNAIRES
5. BEING SO RESTLESS THAT IT IS HARD TO SIT STILL: NOT AT ALL
3. WORRYING TOO MUCH ABOUT DIFFERENT THINGS: NOT AT ALL
7. FEELING AFRAID AS IF SOMETHING AWFUL MIGHT HAPPEN: NOT AT ALL
GAD7 TOTAL SCORE: 0
IF YOU CHECKED OFF ANY PROBLEMS ON THIS QUESTIONNAIRE, HOW DIFFICULT HAVE THESE PROBLEMS MADE IT FOR YOU TO DO YOUR WORK, TAKE CARE OF THINGS AT HOME, OR GET ALONG WITH OTHER PEOPLE: NOT DIFFICULT AT ALL
2. NOT BEING ABLE TO STOP OR CONTROL WORRYING: NOT AT ALL
6. BECOMING EASILY ANNOYED OR IRRITABLE: NOT AT ALL
1. FEELING NERVOUS, ANXIOUS, OR ON EDGE: NOT AT ALL

## 2019-11-06 ASSESSMENT — PAIN SCALES - GENERAL: PAINLEVEL: SEVERE PAIN (6)

## 2019-11-06 ASSESSMENT — PATIENT HEALTH QUESTIONNAIRE - PHQ9
SUM OF ALL RESPONSES TO PHQ QUESTIONS 1-9: 0
5. POOR APPETITE OR OVEREATING: NOT AT ALL

## 2019-11-06 NOTE — PATIENT INSTRUCTIONS
Diabetes is well controlled!  Blood pressure is well controlled.     No changes today.     Flu shot today.     Results for orders placed or performed in visit on 11/06/19   Lipid Profile     Status: None   Result Value Ref Range    Cholesterol 111 <200 mg/dL    Triglycerides 77 <150 mg/dL    HDL Cholesterol 41 23 - 92 mg/dL    LDL Cholesterol Calculated 55 <100 mg/dL    Non HDL Cholesterol 70 <130 mg/dL   Hemoglobin A1c     Status: Abnormal   Result Value Ref Range    Hemoglobin A1C 7.4 (H) 4.0 - 6.0 %   CBC with platelets     Status: None   Result Value Ref Range    WBC 9.0 4.0 - 11.0 10e9/L    RBC Count 5.16 4.4 - 5.9 10e12/L    Hemoglobin 15.5 13.3 - 17.7 g/dL    Hematocrit 45.8 40.0 - 53.0 %    MCV 89 78 - 100 fl    MCH 30.0 26.5 - 33.0 pg    MCHC 33.8 31.5 - 36.5 g/dL    RDW 13.0 10.0 - 15.0 %    Platelet Count 256 150 - 450 10e9/L   Comprehensive metabolic panel     Status: None   Result Value Ref Range    Sodium 138 134 - 144 mmol/L    Potassium 4.5 3.5 - 5.1 mmol/L    Chloride 102 98 - 107 mmol/L    Carbon Dioxide 28 21 - 31 mmol/L    Anion Gap 8 3 - 14 mmol/L    Glucose 103 70 - 105 mg/dL    Urea Nitrogen 14 7 - 25 mg/dL    Creatinine 0.92 0.70 - 1.30 mg/dL    GFR Estimate 83 >60 mL/min/[1.73_m2]    GFR Estimate If Black >90 >60 mL/min/[1.73_m2]    Calcium 9.7 8.6 - 10.3 mg/dL    Bilirubin Total 0.6 0.3 - 1.0 mg/dL    Albumin 4.5 3.5 - 5.7 g/dL    Protein Total 8.2 6.4 - 8.9 g/dL    Alkaline Phosphatase 78 34 - 104 U/L    ALT 20 7 - 52 U/L    AST 24 13 - 39 U/L   *UA reflex to Microscopic     Status: None   Result Value Ref Range    Color Urine Yellow     Appearance Urine Clear     Glucose Urine Negative NEG^Negative mg/dL    Bilirubin Urine Negative NEG^Negative    Ketones Urine Negative NEG^Negative mg/dL    Specific Gravity Urine 1.010 1.000 - 1.030    Blood Urine Negative NEG^Negative    pH Urine 7.0 5.0 - 9.0 pH    Protein Albumin Urine Negative NEG^Negative mg/dL    Urobilinogen Urine 0.2 0.2 - 1.0  EU/dL    Nitrite Urine Negative NEG^Negative    Leukocyte Esterase Urine Negative NEG^Negative    Source Midstream Urine       Return for Diabetes labs and clinic follow-up appointment every 3 to 4 months.  --- (Go for about 91 to 100 days)    Aspects of Diabetes we can improve:  Hemoglobin A1c Lab Results   Component Value Date    A1C 7.4 11/06/2019    A1C 7.3 07/30/2019    A1C 7.1 04/23/2019    A1C 7.9 01/15/2019    A1C 7.5 10/15/2018    Goal range is under 8. Best is 6.5 to 7   Blood Pressure 126/60 Goal to keep less than 140/90   Tobacco  reports that he has never smoked. He has never used smokeless tobacco. Goal to abstain from tobacco   Eye Exam -- Do Yearly -- Annual diabetic eye exam   Healthy weight Body mass index is 28.75 kg/m . Goal BMI under 30, best is under 25.      -- Trying to exercise daily (goal at least 20 min/day) with moderate aerobic activity   -- Eat healthy (resources from ADA at http://www.diabetes.org/)   -- Taking good care of my feet. Consider seeing the Podiatrist   -- Check blood sugars as directed, record in log book and bring to every appointment      Schedule lab only appointment --- A few days AFTER: 2/4/20   Schedule clinic appointment with Dr. Wilson -- Same day as labs, or 1-2 days later.     Insurance companies are now grading you and I on your blood sugar control -- Goal is to get your A1c down to 7.9% or lower and NO Smoking!    -- Medicare and most insurance companies, will only cover Hemoglobin A1c labs to be rechecked every 91+ days.      Hemoglobin A1C   Date Value Ref Range Status   11/06/2019 7.4 (H) 4.0 - 6.0 % Final   07/30/2019 7.3 (H) 4.0 - 6.0 % Final       Next follow-up appointment with Dr. Wilson should be scheduled:  -- Approximately a few days AFTER: 2/4/20

## 2019-11-06 NOTE — NURSING NOTE
"Patient presents to the clinic for diabetes management.      Previous A1C is at goal of <8  Lab Results   Component Value Date    A1C 7.4 11/06/2019    A1C 7.3 07/30/2019    A1C 7.1 04/23/2019    A1C 7.9 01/15/2019    A1C 7.5 10/15/2018     Urine microalbumin:creatine: n/a  Foot exam this past year-declines today  Eye exam 03/2018    Tobacco User no  Patient is on a daily aspirin  Patient is on a Statin.  Blood pressure today of:     BP Readings from Last 1 Encounters:   07/30/19 138/84      is at the goal of <139/89 for diabetics.    Chief Complaint   Patient presents with     Diabetes       Initial /60 (BP Location: Right arm, Patient Position: Sitting, Cuff Size: Adult Regular)   Pulse 88   Temp 96.9  F (36.1  C) (Tympanic)   Resp 16   Wt 89.6 kg (197 lb 8 oz)   BMI 28.75 kg/m   Estimated body mass index is 28.75 kg/m  as calculated from the following:    Height as of 1/15/19: 1.765 m (5' 9.5\").    Weight as of this encounter: 89.6 kg (197 lb 8 oz).  Medication Reconciliation: complete    Stephenie Varghese LPN          "

## 2019-11-06 NOTE — NURSING NOTE
Immunization Documentation    Prior to Immunization administration, verified patients identity using patient's name and date of birth. Please see IMMUNIZATIONS  and order for additional information.  Patient / Parent instructed to remain in clinic for 15 minutes and report any adverse reaction to staff immediately.    Was entire vial of medication used? Yes  Vial/Syringe: Bianca Varghese LPN  11/6/2019   11:05 AM

## 2019-11-07 ASSESSMENT — ANXIETY QUESTIONNAIRES: GAD7 TOTAL SCORE: 0

## 2019-12-17 ENCOUNTER — TELEPHONE (OUTPATIENT)
Dept: INTERNAL MEDICINE | Facility: OTHER | Age: 64
End: 2019-12-17

## 2019-12-17 NOTE — TELEPHONE ENCOUNTER
Received incoming form from [place] [employer] in regards to diabetes management.  Form completed and awaiting MD signature at this time.      Stephenie Varghese LPN 12/17/2019 1:56 PM

## 2020-02-06 ENCOUNTER — OFFICE VISIT (OUTPATIENT)
Dept: INTERNAL MEDICINE | Facility: OTHER | Age: 65
End: 2020-02-06
Attending: INTERNAL MEDICINE
Payer: COMMERCIAL

## 2020-02-06 VITALS
TEMPERATURE: 96.9 F | DIASTOLIC BLOOD PRESSURE: 82 MMHG | HEIGHT: 70 IN | RESPIRATION RATE: 20 BRPM | BODY MASS INDEX: 27.81 KG/M2 | HEART RATE: 84 BPM | SYSTOLIC BLOOD PRESSURE: 130 MMHG | WEIGHT: 194.25 LBS

## 2020-02-06 DIAGNOSIS — Z79.4 CONTROLLED TYPE 2 DIABETES MELLITUS WITH COMPLICATION, WITH LONG-TERM CURRENT USE OF INSULIN (H): Primary | ICD-10-CM

## 2020-02-06 DIAGNOSIS — E11.8 CONTROLLED TYPE 2 DIABETES MELLITUS WITH COMPLICATION, WITH LONG-TERM CURRENT USE OF INSULIN (H): Primary | ICD-10-CM

## 2020-02-06 DIAGNOSIS — E78.2 MIXED HYPERLIPIDEMIA: ICD-10-CM

## 2020-02-06 DIAGNOSIS — Z00.00 ENCOUNTER FOR MEDICARE ANNUAL WELLNESS EXAM: ICD-10-CM

## 2020-02-06 DIAGNOSIS — Z13.6 SCREENING FOR AAA (ABDOMINAL AORTIC ANEURYSM): ICD-10-CM

## 2020-02-06 DIAGNOSIS — N18.2 CKD (CHRONIC KIDNEY DISEASE) STAGE 2, GFR 60-89 ML/MIN: ICD-10-CM

## 2020-02-06 DIAGNOSIS — I10 BENIGN ESSENTIAL HYPERTENSION: ICD-10-CM

## 2020-02-06 DIAGNOSIS — Z79.4 CONTROLLED TYPE 2 DIABETES MELLITUS WITH COMPLICATION, WITH LONG-TERM CURRENT USE OF INSULIN (H): ICD-10-CM

## 2020-02-06 DIAGNOSIS — E11.8 CONTROLLED TYPE 2 DIABETES MELLITUS WITH COMPLICATION, WITH LONG-TERM CURRENT USE OF INSULIN (H): ICD-10-CM

## 2020-02-06 DIAGNOSIS — I10 ESSENTIAL HYPERTENSION: ICD-10-CM

## 2020-02-06 LAB
ALBUMIN SERPL-MCNC: 4.5 G/DL (ref 3.5–5.7)
ALBUMIN UR-MCNC: NEGATIVE MG/DL
ALP SERPL-CCNC: 82 U/L (ref 34–104)
ALT SERPL W P-5'-P-CCNC: 17 U/L (ref 7–52)
ANION GAP SERPL CALCULATED.3IONS-SCNC: 7 MMOL/L (ref 3–14)
APPEARANCE UR: CLEAR
AST SERPL W P-5'-P-CCNC: 18 U/L (ref 13–39)
BILIRUB SERPL-MCNC: 0.5 MG/DL (ref 0.3–1)
BILIRUB UR QL STRIP: NEGATIVE
BUN SERPL-MCNC: 15 MG/DL (ref 7–25)
CALCIUM SERPL-MCNC: 9.6 MG/DL (ref 8.6–10.3)
CHLORIDE SERPL-SCNC: 103 MMOL/L (ref 98–107)
CHOLEST SERPL-MCNC: 121 MG/DL
CO2 SERPL-SCNC: 28 MMOL/L (ref 21–31)
COLOR UR AUTO: YELLOW
CREAT SERPL-MCNC: 1.02 MG/DL (ref 0.7–1.3)
CREAT UR-MCNC: 48 MG/DL
ERYTHROCYTE [DISTWIDTH] IN BLOOD BY AUTOMATED COUNT: 13.2 % (ref 10–15)
GFR SERPL CREATININE-BSD FRML MDRD: 73 ML/MIN/{1.73_M2}
GLUCOSE SERPL-MCNC: 70 MG/DL (ref 70–105)
GLUCOSE UR STRIP-MCNC: NEGATIVE MG/DL
HBA1C MFR BLD: 7 % (ref 4–6)
HCT VFR BLD AUTO: 47.4 % (ref 40–53)
HDLC SERPL-MCNC: 40 MG/DL (ref 23–92)
HGB BLD-MCNC: 15.8 G/DL (ref 13.3–17.7)
HGB UR QL STRIP: NEGATIVE
KETONES UR STRIP-MCNC: NEGATIVE MG/DL
LDLC SERPL CALC-MCNC: 68 MG/DL
LEUKOCYTE ESTERASE UR QL STRIP: NEGATIVE
MCH RBC QN AUTO: 29.6 PG (ref 26.5–33)
MCHC RBC AUTO-ENTMCNC: 33.3 G/DL (ref 31.5–36.5)
MCV RBC AUTO: 89 FL (ref 78–100)
MICROALBUMIN UR-MCNC: 7 MG/L
MICROALBUMIN/CREAT UR: 14.2 MG/G CR (ref 0–17)
NITRATE UR QL: NEGATIVE
NONHDLC SERPL-MCNC: 81 MG/DL
PH UR STRIP: 7 PH (ref 5–7)
PLATELET # BLD AUTO: 231 10E9/L (ref 150–450)
POTASSIUM SERPL-SCNC: 4.4 MMOL/L (ref 3.5–5.1)
PROT SERPL-MCNC: 7.4 G/DL (ref 6.4–8.9)
RBC # BLD AUTO: 5.34 10E12/L (ref 4.4–5.9)
SODIUM SERPL-SCNC: 138 MMOL/L (ref 134–144)
SOURCE: NORMAL
SP GR UR STRIP: 1.01 (ref 1–1.03)
TRIGL SERPL-MCNC: 67 MG/DL
UROBILINOGEN UR STRIP-MCNC: NORMAL MG/DL (ref 0–2)
WBC # BLD AUTO: 8.7 10E9/L (ref 4–11)

## 2020-02-06 PROCEDURE — 85027 COMPLETE CBC AUTOMATED: CPT | Mod: ZL | Performed by: INTERNAL MEDICINE

## 2020-02-06 PROCEDURE — 99212 OFFICE O/P EST SF 10 MIN: CPT | Mod: 25 | Performed by: INTERNAL MEDICINE

## 2020-02-06 PROCEDURE — 36415 COLL VENOUS BLD VENIPUNCTURE: CPT | Mod: ZL | Performed by: INTERNAL MEDICINE

## 2020-02-06 PROCEDURE — 80053 COMPREHEN METABOLIC PANEL: CPT | Mod: ZL | Performed by: INTERNAL MEDICINE

## 2020-02-06 PROCEDURE — G0438 PPPS, INITIAL VISIT: HCPCS | Performed by: INTERNAL MEDICINE

## 2020-02-06 PROCEDURE — 80061 LIPID PANEL: CPT | Mod: ZL | Performed by: INTERNAL MEDICINE

## 2020-02-06 PROCEDURE — G0463 HOSPITAL OUTPT CLINIC VISIT: HCPCS

## 2020-02-06 PROCEDURE — 83036 HEMOGLOBIN GLYCOSYLATED A1C: CPT | Mod: ZL | Performed by: INTERNAL MEDICINE

## 2020-02-06 PROCEDURE — 82043 UR ALBUMIN QUANTITATIVE: CPT | Mod: ZL | Performed by: INTERNAL MEDICINE

## 2020-02-06 PROCEDURE — 81003 URINALYSIS AUTO W/O SCOPE: CPT | Mod: ZL | Performed by: INTERNAL MEDICINE

## 2020-02-06 ASSESSMENT — ENCOUNTER SYMPTOMS
COUGH: 0
ABDOMINAL PAIN: 0
NAUSEA: 0
FEVER: 0
AGITATION: 0
NUMBNESS: 1
DIARRHEA: 0
PALPITATIONS: 0
BRUISES/BLEEDS EASILY: 0
EYE PAIN: 0
FATIGUE: 0
WHEEZING: 0
DYSURIA: 0
MYALGIAS: 0
LIGHT-HEADEDNESS: 0
CONFUSION: 0
HEMATURIA: 0
CHILLS: 0
SHORTNESS OF BREATH: 0
DIZZINESS: 0
ARTHRALGIAS: 0
VOMITING: 0

## 2020-02-06 ASSESSMENT — ANXIETY QUESTIONNAIRES
IF YOU CHECKED OFF ANY PROBLEMS ON THIS QUESTIONNAIRE, HOW DIFFICULT HAVE THESE PROBLEMS MADE IT FOR YOU TO DO YOUR WORK, TAKE CARE OF THINGS AT HOME, OR GET ALONG WITH OTHER PEOPLE: NOT DIFFICULT AT ALL
1. FEELING NERVOUS, ANXIOUS, OR ON EDGE: NOT AT ALL
6. BECOMING EASILY ANNOYED OR IRRITABLE: NOT AT ALL
3. WORRYING TOO MUCH ABOUT DIFFERENT THINGS: NOT AT ALL
2. NOT BEING ABLE TO STOP OR CONTROL WORRYING: NOT AT ALL
GAD7 TOTAL SCORE: 0
5. BEING SO RESTLESS THAT IT IS HARD TO SIT STILL: NOT AT ALL
7. FEELING AFRAID AS IF SOMETHING AWFUL MIGHT HAPPEN: NOT AT ALL

## 2020-02-06 ASSESSMENT — PAIN SCALES - GENERAL: PAINLEVEL: SEVERE PAIN (6)

## 2020-02-06 ASSESSMENT — PATIENT HEALTH QUESTIONNAIRE - PHQ9
5. POOR APPETITE OR OVEREATING: NOT AT ALL
SUM OF ALL RESPONSES TO PHQ QUESTIONS 1-9: 0

## 2020-02-06 ASSESSMENT — MIFFLIN-ST. JEOR: SCORE: 1664.42

## 2020-02-06 NOTE — PROGRESS NOTES
"  Medicare Wellness Visit   Mr. Delarosa is a 65 year old male who presents today who presents for Preventive Visit.    Are you in the first 12 months of your Medicare coverage?  No      Health Risk Assessment     Do you feel safe in your environment? Yes    Physical Health:    In general, how would you rate your overall physical health? good    Outside of work, how many days during the week do you exercise? 6-7 days/week    Outside of work, approximately how many minutes a day do you exercise?30-45 minutes    If you drink alcohol do you typically have >3 drinks per day or >7 drinks per week? No    Do you usually eat at least 4 servings of fruit and vegetables a day, include whole grains & fiber and avoid regularly eating high fat or \"junk\" foods? Yes and YES    Do you have any problems taking medications regularly?  No    Do you have any side effects from medications? none    Needs assistance for the following daily activities: no assistance needed    Which of the following safety concerns are present in your home?  lack of grab bars in the bathroom     Hearing impairment: YES:difficulty following conversation in noisy room, difficulty following , mens voices are easier to understand than womens-No Hearing Aids    In the past 6 months, have you been bothered by leaking of urine? no      Screening       Fall risk  Fallen 2 or more times in the past year?: No  Any fall with injury in the past year?: No    Cognitive Screening   1) Repeat 3 items (Leader, Season, Table)    2) Clock draw: NORMAL  3) 3 item recall: Recalls 2 objects   Results: NORMAL clock, 1-2 items recalled: COGNITIVE IMPAIRMENT LESS LIKELY    Mini-CogTM Copyright JEANNIE Knight. Licensed by the author for use in [facility]; reprinted with permission (elza@.[facility]). All rights reserved.      Do you have sleep apnea, excessive snoring or daytime drowsiness?: no      Medical Record Update       Reviewed and updated as needed this visit " by clinical staff  Tobacco  Allergies  Meds  Med Hx  Surg Hx  Fam Hx  Soc Hx        Reviewed and updated as needed this visit by Provider  Tobacco  Allergies  Meds  Problems  Med Hx  Surg Hx  Fam Hx           Current providers sharing in care for this patient include:   Patient Care Team:  Moshe Wilson MD as PCP - General (Internal Medicine)  Moshe Wilson MD as Assigned PCP     Subjective:   Patient presents for his annual Medicare wellness visit as well as follow-up of a few other issues.    Type 2 diabetes, currently well controlled.  Using insulin therapy.  Also on glipizide and metformin.  No hypoglycemia reported.  A1c is come down slightly.  Doing well with current therapies.  No changes today.    Stage II chronic kidney disease, kidney function did worsen slightly.  Encouraged NSAID avoidance.  Plan to recheck in about 3 months.    Hypertension, currently well controlled.  Systolic pressures at home typically run in the 130 range.  He denies lightheadedness or dizziness.  No changes to medication regimen at this time.    Mixed hyperlipidemia, well controlled with Lipitor.  Tolerating well.  No side effects reported.    Screening abdominal aortic ultrasound is due, orders placed.    Having some right palmar symptoms... he will continue to watch for changes.     Review of Systems   Constitutional: Negative for chills, fatigue and fever.   HENT: Negative for congestion and hearing loss.    Eyes: Negative for pain and visual disturbance.   Respiratory: Negative for cough, shortness of breath and wheezing.    Cardiovascular: Negative for chest pain and palpitations.   Gastrointestinal: Negative for abdominal pain, diarrhea, nausea and vomiting.   Endocrine: Negative for cold intolerance and heat intolerance.   Genitourinary: Negative for dysuria and hematuria.   Musculoskeletal: Negative for arthralgias and myalgias.        Right 4th finger, in palm. starting to develop trigger finger vs  "contracture   Skin: Negative for pallor.   Allergic/Immunologic: Negative for immunocompromised state.   Neurological: Positive for numbness (+ bilateral feet, comes and goes -- better with better glucose control). Negative for dizziness and light-headedness.   Hematological: Does not bruise/bleed easily.   Psychiatric/Behavioral: Negative for agitation and confusion.          OBJECTIVE:     Vitals:    02/06/20 0831   BP: 130/82   BP Location: Right arm   Patient Position: Sitting   Cuff Size: Adult Regular   Pulse: 84   Resp: 20   Temp: 96.9  F (36.1  C)   TempSrc: Tympanic   Weight: 88.1 kg (194 lb 4 oz)   Height: 1.765 m (5' 9.5\")        Estimated body mass index is 28.27 kg/m  as calculated from the following:    Height as of this encounter: 1.765 m (5' 9.5\").    Weight as of this encounter: 88.1 kg (194 lb 4 oz).     Physical Exam  Constitutional:       General: He is not in acute distress.     Appearance: He is well-developed. He is not diaphoretic.   HENT:      Head: Normocephalic and atraumatic.   Eyes:      General: No scleral icterus.     Conjunctiva/sclera: Conjunctivae normal.   Neck:      Musculoskeletal: Neck supple.      Vascular: No carotid bruit.   Cardiovascular:      Rate and Rhythm: Normal rate and regular rhythm.      Pulses: Normal pulses.   Pulmonary:      Effort: Pulmonary effort is normal.      Breath sounds: Normal breath sounds.   Abdominal:      Palpations: Abdomen is soft.      Tenderness: There is no abdominal tenderness.   Musculoskeletal:         General: Tenderness (right 4th finger, in palm. starting to develop trigger finger vs contracture  ) present. No deformity.      Right lower leg: No edema.      Left lower leg: No edema.   Lymphadenopathy:      Cervical: No cervical adenopathy.   Skin:     General: Skin is warm and dry.      Findings: No rash.   Neurological:      Mental Status: He is alert and oriented to person, place, and time. Mental status is at baseline.   Psychiatric:  "        Mood and Affect: Mood normal.         Behavior: Behavior normal.         Labs reviewed in EPIC    ASSESSMENT / PLAN:       ICD-10-CM    1. Controlled type 2 diabetes mellitus with complication, with long-term current use of insulin (H) E11.8     Z79.4    2. CKD (chronic kidney disease) stage 2, GFR 60-89 ml/min N18.2    3. Benign essential hypertension I10    4. Mixed hyperlipidemia E78.2    5. Screening for AAA (abdominal aortic aneurysm) Z13.6 US Aorta Medicare AAA Screening   6. Encounter for Medicare annual wellness exam Z00.00         End of Life Planning     Do you have a Health Care Directive? No, advance care planning information given to patient to review.  Patient plans to discuss their wishes with loved ones or provider.      End of Life Planning:  Patient currently has an advanced directive: No.  I have verified the patient's ablity to prepare an advanced directive/make health care decisions.  Literature was provided to assist patient in preparing an advanced directive.    Preventative Care Guidelines and Health Counseling     COUNSELING:  Reviewed preventive health counseling  Special attention given to:   Preventative screening  Regular exercise  Healthy diet/nutrition  Immunizations     130/82       Body mass index is 28.27 kg/m .         Appropriate preventive services were discussed with this patient, including applicable screening as appropriate for cardiovascular disease, diabetes, osteopenia/osteoporosis, and glaucoma.  As appropriate for age/gender, discussed screening for colorectal cancer, prostate cancer, breast cancer, and cervical cancer. Checklist reviewing preventive services available has been given to the patient.    Reviewed patients plan of care and provided an AVS. The Basic Care Plan (routine screening as documented in Health Maintenance) for patient meets the Care Plan requirement. This Care Plan has been established and reviewed with the Patient and any available family  members.    Counseling Resources:  ATP IV Guidelines  Pooled Cohorts Equation Calculator  Breast Cancer Risk Calculator  FRAX Risk Assessment  ICSI Preventive Guidelines  Dietary Guidelines for Americans, 2010  USDA's MyPlate  ASA Prophylaxis  Lung CA Screening    Moshe Wilson MD   2/6/2020  8:25 AM  [facility]

## 2020-02-06 NOTE — NURSING NOTE
"Patient presents to the clinic for diabetes management.      Previous A1C is at goal of <8  Lab Results   Component Value Date    A1C 7.0 02/06/2020    A1C 7.4 11/06/2019    A1C 7.3 07/30/2019    A1C 7.1 04/23/2019    A1C 7.9 01/15/2019     Urine microalbumin:creatine: n/a  Foot exam years ago-declines today  Eye exam Dec 2019    Tobacco User no  Patient is on a daily aspirin  Patient is on a Statin.  Blood pressure today of:     BP Readings from Last 1 Encounters:   11/06/19 126/60      is at the goal of <139/89 for diabetics.    Chief Complaint   Patient presents with     Diabetes       Initial BP (!) 170/100 (BP Location: Right arm, Patient Position: Sitting, Cuff Size: Adult Regular)   Pulse 84   Temp 96.9  F (36.1  C) (Tympanic)   Resp 20   Ht 1.765 m (5' 9.5\")   Wt 88.1 kg (194 lb 4 oz)   BMI 28.27 kg/m   Estimated body mass index is 28.27 kg/m  as calculated from the following:    Height as of this encounter: 1.765 m (5' 9.5\").    Weight as of this encounter: 88.1 kg (194 lb 4 oz).  Medication Reconciliation: complete    Stephenie Varghese LPN        "

## 2020-02-06 NOTE — PATIENT INSTRUCTIONS
Blood pressure is well controlled at home.  Continue to check intermittently.    Weights is appropriate.    Diabetes is well controlled.    Cholesterol levels look excellent.    Screening abdominal aortic aneurysm is due, orders placed.    Recent Labs   Lab Test 02/06/20  0714 11/06/19  0721 07/30/19  0700   A1C 7.0* 7.4* 7.3*   LDL 68 55 65   HDL 40 41 39   TRIG 67 77 57   ALT 17 20 16   CR 1.02 0.92 0.95   GFRESTIMATED 73 83 80   GFRESTBLACK 89 >90 >90   POTASSIUM 4.4 4.5 4.4      Orders Only on 02/06/2020   Component Date Value Ref Range Status     Cholesterol 02/06/2020 121  <200 mg/dL Final     Triglycerides 02/06/2020 67  <150 mg/dL Final     HDL Cholesterol 02/06/2020 40  23 - 92 mg/dL Final     LDL Cholesterol Calculated 02/06/2020 68  <100 mg/dL Final    Desirable:       <100 mg/dl     Non HDL Cholesterol 02/06/2020 81  <130 mg/dL Final     Hemoglobin A1C 02/06/2020 7.0* 4.0 - 6.0 % Final     WBC 02/06/2020 8.7  4.0 - 11.0 10e9/L Final     RBC Count 02/06/2020 5.34  4.4 - 5.9 10e12/L Final     Hemoglobin 02/06/2020 15.8  13.3 - 17.7 g/dL Final     Hematocrit 02/06/2020 47.4  40.0 - 53.0 % Final     MCV 02/06/2020 89  78 - 100 fl Final     MCH 02/06/2020 29.6  26.5 - 33.0 pg Final     MCHC 02/06/2020 33.3  31.5 - 36.5 g/dL Final     RDW 02/06/2020 13.2  10.0 - 15.0 % Final     Platelet Count 02/06/2020 231  150 - 450 10e9/L Final     Sodium 02/06/2020 138  134 - 144 mmol/L Final     Potassium 02/06/2020 4.4  3.5 - 5.1 mmol/L Final     Chloride 02/06/2020 103  98 - 107 mmol/L Final     Carbon Dioxide 02/06/2020 28  21 - 31 mmol/L Final     Anion Gap 02/06/2020 7  3 - 14 mmol/L Final     Glucose 02/06/2020 70  70 - 105 mg/dL Final     Urea Nitrogen 02/06/2020 15  7 - 25 mg/dL Final     Creatinine 02/06/2020 1.02  0.70 - 1.30 mg/dL Final     GFR Estimate 02/06/2020 73  >60 mL/min/[1.73_m2] Final     GFR Estimate If Black 02/06/2020 89  >60 mL/min/[1.73_m2] Final     Calcium 02/06/2020 9.6  8.6 - 10.3 mg/dL  Final     Bilirubin Total 02/06/2020 0.5  0.3 - 1.0 mg/dL Final     Albumin 02/06/2020 4.5  3.5 - 5.7 g/dL Final     Protein Total 02/06/2020 7.4  6.4 - 8.9 g/dL Final     Alkaline Phosphatase 02/06/2020 82  34 - 104 U/L Final     ALT 02/06/2020 17  7 - 52 U/L Final     AST 02/06/2020 18  13 - 39 U/L Final     Color Urine 02/06/2020 Yellow   Final     Appearance Urine 02/06/2020 Clear   Final     Glucose Urine 02/06/2020 Negative  NEG^Negative mg/dL Final     Bilirubin Urine 02/06/2020 Negative  NEG^Negative Final     Ketones Urine 02/06/2020 Negative  NEG^Negative mg/dL Final     Specific Gravity Urine 02/06/2020 1.009  1.003 - 1.035 Final     Blood Urine 02/06/2020 Negative  NEG^Negative Final     pH Urine 02/06/2020 7.0  5.0 - 7.0 pH Final     Protein Albumin Urine 02/06/2020 Negative  NEG^Negative mg/dL Final     Urobilinogen mg/dL 02/06/2020 Normal  0.0 - 2.0 mg/dL Final     Nitrite Urine 02/06/2020 Negative  NEG^Negative Final     Leukocyte Esterase Urine 02/06/2020 Negative  NEG^Negative Final     Source 02/06/2020 Unspecified Urine   Final          Immunization History   Administered Date(s) Administered     Influenza (IIV3) PF 11/02/2006, 10/26/2007     Influenza Vaccine IM > 6 months Valent IIV4 11/06/2019     TDAP Vaccine (Adacel) 11/25/2013      -- Pneumococcal PCV 13 shot is due anytime.     -- Get your tetanus shot updated - from one of the local pharmacies, at your convenience in 2023.     -- Get the new shingles shot (2 in series) - from one of the local pharmacies, at your convenience.     Pneumococcal Pneumonia vaccines (PCV 13 and PCV 23)     Pneumococcal Conjugate 13 - Valent Vaccine (One time only after age 65).     Pneumococcal 23 - Valent Vaccine -- Two doses (One before age 65 and One After)    -- repeat every 5 years in certain patient populations.     PCV 13 should be given prior to PCV 23 -- THEN -- In eight weeks or more, PCV 23 can be given.   If the patient has already received PCV 23,  they should not receive PCV 13 for one year.        Return for Diabetes labs and clinic follow-up appointment every 3 to 4 months.  --- (Go for about 91 to 100 days)    Aspects of Diabetes we can improve:  Hemoglobin A1c Lab Results   Component Value Date    A1C 7.0 02/06/2020    A1C 7.4 11/06/2019    A1C 7.3 07/30/2019    A1C 7.1 04/23/2019    A1C 7.9 01/15/2019    Goal range is under 8. Best is 6.5 to 7   Blood Pressure 130/82 Goal to keep less than 140/90   Tobacco  reports that he has never smoked. He has never used smokeless tobacco. Goal to abstain from tobacco   Eye Exam -- Do Yearly -- Annual diabetic eye exam   Healthy weight Body mass index is 28.27 kg/m . Goal BMI under 30, best is under 25.      -- Trying to exercise daily (goal at least 20 min/day) with moderate aerobic activity   -- Eat healthy (resources from ADA at http://www.diabetes.org/)   -- Taking good care of my feet. Consider seeing the Podiatrist   -- Check blood sugars as directed, record in log book and bring to every appointment      Schedule lab only appointment --- A few days AFTER: 5/6/20   Schedule clinic appointment with Dr. Wilson -- Same day as labs, or 1-2 days later.     Insurance companies are now grading you and I on your blood sugar control -- Goal is to get your A1c down to 7.9% or lower and NO Smoking!    -- Medicare and most insurance companies, will only cover Hemoglobin A1c labs to be rechecked every 91+ days.      Hemoglobin A1C   Date Value Ref Range Status   02/06/2020 7.0 (H) 4.0 - 6.0 % Final   11/06/2019 7.4 (H) 4.0 - 6.0 % Final       Next follow-up appointment with Dr. Wilson should be scheduled:  -- Approximately a few days AFTER: 5/6/20        Patient Education   Personalized Prevention Plan  You are due for the preventive services outlined below.  Your care team is available to assist you in scheduling these services.  If you have already completed any of these items, please share that information with your  care team to update in your medical record.  Health Maintenance Due   Topic Date Due     AORTIC ANEURYSM SCREENING (SYSTEM ASSIGNED)  01/25/2020     Annual Wellness Visit  01/25/2020     Pneumococcal Vaccine (1 of 2 - PCV13) 01/25/2020     Preventive Health Recommendations  See your health care provider every year to    Review health changes.     Discuss preventive care.      Review your medicines if your doctor has prescribed any.    Talk with your health care provider about whether you should have a test to screen for prostate cancer (PSA).    Every 3 years, have a diabetes test (fasting glucose). If you are at risk for diabetes, you should have this test more often.    Every 5 years, have a cholesterol test. Have this test more often if you are at risk for high cholesterol or heart disease.     Every 10 years, have a colonoscopy. Or, have a yearly FIT test (stool test). These exams will check for colon cancer.    Talk to with your health care provider about screening for Abdominal Aortic Aneurysm if you have a family history of AAA or have a history of smoking.    Shots:     Get a flu shot each year.     Get a tetanus shot every 10 years.     Talk to your doctor about your pneumonia vaccines. There are now two you should receive - Pneumovax (PPSV 23) and Prevnar (PCV 13).    Talk to your pharmacist about a shingles vaccine.     Talk to your doctor about the hepatitis B vaccine.    Nutrition:     Eat at least 5 servings of fruits and vegetables each day.     Eat whole-grain bread, whole-wheat pasta and brown rice instead of white grains and rice.     Get adequate Calcium and Vitamin D.     Lifestyle    Exercise for at least 150 minutes a week (30 minutes a day, 5 days a week). This will help you control your weight and prevent disease.     Limit alcohol to one drink per day.     No smoking.     Wear sunscreen to prevent skin cancer.     See your dentist every six months for an exam and cleaning.     See your eye  doctor every 1 to 2 years to screen for conditions such as glaucoma, macular degeneration and cataracts.    Personalized Prevention Plan  You are due for the preventive services outlined below.  Your care team is available to assist you in scheduling these services.  If you have already completed any of these items, please share that information with your care team to update in your medical record.  Health Maintenance Due   Topic Date Due     AORTIC ANEURYSM SCREENING (SYSTEM ASSIGNED)  01/25/2020     Annual Wellness Visit  01/25/2020     Pneumococcal Vaccine (1 of 2 - PCV13) 01/25/2020

## 2020-02-07 ASSESSMENT — ANXIETY QUESTIONNAIRES: GAD7 TOTAL SCORE: 0

## 2020-02-14 ENCOUNTER — HOSPITAL ENCOUNTER (OUTPATIENT)
Dept: ULTRASOUND IMAGING | Facility: OTHER | Age: 65
Discharge: HOME | End: 2020-02-14
Attending: INTERNAL MEDICINE | Admitting: INTERNAL MEDICINE
Payer: MEDICARE

## 2020-02-14 DIAGNOSIS — Z13.6 SCREENING FOR AAA (ABDOMINAL AORTIC ANEURYSM): ICD-10-CM

## 2020-02-14 PROCEDURE — 76706 US ABDL AORTA SCREEN AAA: CPT

## 2020-02-19 ENCOUNTER — TELEPHONE (OUTPATIENT)
Dept: INTERNAL MEDICINE | Facility: OTHER | Age: 65
End: 2020-02-19

## 2020-02-19 NOTE — TELEPHONE ENCOUNTER
DJS- Patient states his pharmacy is no longer able to get the needles he uses for his insulin pen. Ok to leave message [phone]

## 2020-02-19 NOTE — TELEPHONE ENCOUNTER
After checking with Doyle Crain, patient was notified that they are able to get his pen needles and they will be available to  by tomorrow.  Belkis Durand LPN on 2/19/2020 at 9:42 AM

## 2020-02-26 DIAGNOSIS — E11.8 CONTROLLED TYPE 2 DIABETES MELLITUS WITH COMPLICATION, WITH LONG-TERM CURRENT USE OF INSULIN (H): Primary | ICD-10-CM

## 2020-02-26 DIAGNOSIS — Z79.4 CONTROLLED TYPE 2 DIABETES MELLITUS WITH COMPLICATION, WITH LONG-TERM CURRENT USE OF INSULIN (H): Primary | ICD-10-CM

## 2020-02-26 NOTE — TELEPHONE ENCOUNTER
Request from pharmacy for lancets 28G. Not on current medication list. Teed up for consideration.

## 2020-02-27 RX ORDER — LANCETS 28 GAUGE
EACH MISCELLANEOUS
Qty: 100 EACH | Refills: 3 | Status: SHIPPED | OUTPATIENT
Start: 2020-02-27 | End: 2021-02-11

## 2020-02-27 NOTE — TELEPHONE ENCOUNTER
Routing refill request to provider for review/approval because:  Drug not on the [facility] refill protocol   Drug not active on medication list      -Patient does have blood glucose monitoring. No lancest in medications history. Please advise. Raven Sales RN on 2/27/2020 at 10:19 AM

## 2020-06-24 DIAGNOSIS — Z79.4 CONTROLLED TYPE 2 DIABETES MELLITUS WITH COMPLICATION, WITH LONG-TERM CURRENT USE OF INSULIN (H): ICD-10-CM

## 2020-06-24 DIAGNOSIS — E78.2 MIXED HYPERLIPIDEMIA: ICD-10-CM

## 2020-06-24 DIAGNOSIS — E11.8 CONTROLLED TYPE 2 DIABETES MELLITUS WITH COMPLICATION, WITH LONG-TERM CURRENT USE OF INSULIN (H): ICD-10-CM

## 2020-06-24 RX ORDER — ATORVASTATIN CALCIUM 40 MG/1
40 TABLET, FILM COATED ORAL DAILY
Qty: 90 TABLET | Refills: 1 | Status: SHIPPED | OUTPATIENT
Start: 2020-06-24 | End: 2021-02-03

## 2020-06-24 NOTE — TELEPHONE ENCOUNTER
"Requested Prescriptions   Pending Prescriptions Disp Refills     atorvastatin (LIPITOR) 40 MG tablet 90 tablet 3     Sig: Take 1 tablet (40 mg) by mouth daily       Statins Protocol Passed - 6/24/2020  9:23 AM        Passed - LDL on file in past 12 months     Recent Labs   Lab Test 02/06/20  0714   LDL 68             Passed - No abnormal creatine kinase in past 12 months     No lab results found.             Passed - Recent (12 mo) or future (30 days) visit within the authorizing provider's specialty     Patient has had an office visit with the authorizing provider or a provider within the authorizing providers department within the previous 12 mos or has a future within next 30 days. See \"Patient Info\" tab in inbasket, or \"Choose Columns\" in Meds & Orders section of the refill encounter.              Passed - Medication is active on med list        Passed - Patient is age 18 or older           metFORMIN (GLUCOPHAGE) 1000 MG tablet 180 tablet 3     Sig: Take 1 tablet (1,000 mg) by mouth 2 times daily (with meals)       Biguanide Agents Passed - 6/24/2020  9:23 AM        Passed - Patient is age 10 or older        Passed - Patient has documented A1c within the specified period of time.     If HgbA1C is 8 or greater, it needs to be on file within the past 3 months.  If less than 8, must be on file within the past 6 months.     Recent Labs   Lab Test 02/06/20  0714  12/06/17  0853   A1C 7.0*   < >  --    [id number]  --   --  7.2*    < > = values in this interval not displayed.             Passed - Patient's CR is NOT>1.4 OR Patient's EGFR is NOT<45 within past 12 mos.     Recent Labs   Lab Test 02/06/20  0714   GFRESTIMATED 73   GFRESTBLACK 89       Recent Labs   Lab Test 02/06/20  0714   CR 1.02             Passed - Patient does NOT have a diagnosis of CHF.        Passed - Medication is active on med list        Passed - Recent (6 mo) or future (30 days) visit within the authorizing provider's specialty     Patient had " "office visit in the last 6 months or has a visit in the next 30 days with authorizing provider or within the authorizing provider's specialty.  See \"Patient Info\" tab in inbasket, or \"Choose Columns\" in Meds & Orders section of the refill encounter.             LOV 2/6/20 [place]is  Prescription approved per [facility] Refill Protocol.  Brenda J. Goodell, RN on 6/24/2020 at 1:39 PM      "

## 2020-08-25 DIAGNOSIS — E55.9 VITAMIN D DEFICIENCY: ICD-10-CM

## 2020-08-25 RX ORDER — ACETAMINOPHEN 160 MG
TABLET,DISINTEGRATING ORAL
Qty: 180 CAPSULE | Refills: 1 | Status: SHIPPED | OUTPATIENT
Start: 2020-08-25 | End: 2021-02-11

## 2020-08-25 NOTE — TELEPHONE ENCOUNTER
Prescription approved per [facility] Refill Protocol.  LOV: 2/6/2020    Jessica Guillermo RN on 8/25/2020 at 9:26 AM

## 2020-09-08 DIAGNOSIS — Z79.4 CONTROLLED TYPE 2 DIABETES MELLITUS WITH COMPLICATION, WITH LONG-TERM CURRENT USE OF INSULIN (H): ICD-10-CM

## 2020-09-08 DIAGNOSIS — E11.8 CONTROLLED TYPE 2 DIABETES MELLITUS WITH COMPLICATION, WITH LONG-TERM CURRENT USE OF INSULIN (H): ICD-10-CM

## 2020-09-10 RX ORDER — PEN NEEDLE, DIABETIC 32GX 5/32"
NEEDLE, DISPOSABLE MISCELLANEOUS
Qty: 100 EACH | Refills: 4 | Status: SHIPPED | OUTPATIENT
Start: 2020-09-10 | End: 2021-10-11

## 2020-09-10 NOTE — TELEPHONE ENCOUNTER
sent Rx request for the following:   insulin pen needle (BD MARJORIE U/F) 32G X 4 MM miscellaneous   Sig: AS DIRECTED FOR ADMINISTERING INSULIN AT HOME. FOR USE WITH LANTUS INJECTIONS ONCE DAILY     Last Prescription Date:   07/30/2019  Last Fill Qty/Refills:         200, R-3    Last Office Visit:              02/06/2020   Future Office visit:           02/11/2021  Routing refill request to provider for review/approval because:    Patient needs to be seen because it has been more than 6 months since last office visit.    Unable to complete prescription refill per RN Medication Refill Policy.................... Ludivina Cazares RN ....................  9/10/2020   3:03 PM

## 2020-09-28 DIAGNOSIS — Z79.4 CONTROLLED TYPE 2 DIABETES MELLITUS WITH COMPLICATION, WITH LONG-TERM CURRENT USE OF INSULIN (H): ICD-10-CM

## 2020-09-28 DIAGNOSIS — E11.8 CONTROLLED TYPE 2 DIABETES MELLITUS WITH COMPLICATION, WITH LONG-TERM CURRENT USE OF INSULIN (H): ICD-10-CM

## 2020-09-29 NOTE — TELEPHONE ENCOUNTER
Patient notified, states he will call to set up appointment soon.    Sheeba Prieto LPN ...... 9/29/2020 11:45 AM

## 2020-11-03 DIAGNOSIS — Z79.4 CONTROLLED TYPE 2 DIABETES MELLITUS WITH COMPLICATION, WITH LONG-TERM CURRENT USE OF INSULIN (H): ICD-10-CM

## 2020-11-03 DIAGNOSIS — E11.8 CONTROLLED TYPE 2 DIABETES MELLITUS WITH COMPLICATION, WITH LONG-TERM CURRENT USE OF INSULIN (H): ICD-10-CM

## 2020-11-03 NOTE — TELEPHONE ENCOUNTER
[id number] sent Rx request for the following:   metFORMIN (GLUCOPHAGE) 1000 MG tablet  Sig: TAKE 1 TABLET (1,000 MG) BY MOUTH 2 TIMES DAILY (WITH MEALS)    Last Prescription Date:   11/03/2020  Last Fill Qty/Refills:         180, R-3    Last Office Visit:              02/06/2020 (Steve)   Future Office visit:           02/11/2021 (Steve)    Addressed 11/03/2020.  Prescription refused.  This is a duplicate request.  Beatris Colmenares RN.......11/3/2020 4:35 PM

## 2020-11-12 ENCOUNTER — TELEPHONE (OUTPATIENT)
Dept: INTERNAL MEDICINE | Facility: OTHER | Age: 65
End: 2020-11-12

## 2020-11-12 DIAGNOSIS — E78.2 MIXED HYPERLIPIDEMIA: ICD-10-CM

## 2020-11-12 DIAGNOSIS — E11.8 CONTROLLED TYPE 2 DIABETES MELLITUS WITH COMPLICATION, WITH LONG-TERM CURRENT USE OF INSULIN (H): Primary | ICD-10-CM

## 2020-11-12 DIAGNOSIS — Z79.4 CONTROLLED TYPE 2 DIABETES MELLITUS WITH COMPLICATION, WITH LONG-TERM CURRENT USE OF INSULIN (H): Primary | ICD-10-CM

## 2020-11-12 NOTE — TELEPHONE ENCOUNTER
Patient has appt homero/Steve on 12/9 for diabetic check.  Please put orders in for A1C.  He made a lab appt already for 7:50 on 12/9   Mickie Bermudez on 11/12/2020 at 11:39 AM

## 2020-12-09 ENCOUNTER — OFFICE VISIT (OUTPATIENT)
Dept: INTERNAL MEDICINE | Facility: OTHER | Age: 65
End: 2020-12-09
Attending: INTERNAL MEDICINE
Payer: MEDICARE

## 2020-12-09 VITALS
DIASTOLIC BLOOD PRESSURE: 72 MMHG | TEMPERATURE: 96.6 F | RESPIRATION RATE: 16 BRPM | SYSTOLIC BLOOD PRESSURE: 132 MMHG | BODY MASS INDEX: 29.65 KG/M2 | WEIGHT: 200.2 LBS | OXYGEN SATURATION: 98 % | HEIGHT: 69 IN | HEART RATE: 93 BPM

## 2020-12-09 DIAGNOSIS — Z98.890 S/P SUBTOTAL PARATHYROIDECTOMY: ICD-10-CM

## 2020-12-09 DIAGNOSIS — E55.9 VITAMIN D DEFICIENCY: ICD-10-CM

## 2020-12-09 DIAGNOSIS — E11.8 CONTROLLED TYPE 2 DIABETES MELLITUS WITH COMPLICATION, WITH LONG-TERM CURRENT USE OF INSULIN (H): ICD-10-CM

## 2020-12-09 DIAGNOSIS — Z90.89 S/P SUBTOTAL PARATHYROIDECTOMY: ICD-10-CM

## 2020-12-09 DIAGNOSIS — E89.2 POSTPROCEDURAL HYPOPARATHYROIDISM (H): ICD-10-CM

## 2020-12-09 DIAGNOSIS — E11.8 CONTROLLED TYPE 2 DIABETES MELLITUS WITH COMPLICATION, WITH LONG-TERM CURRENT USE OF INSULIN (H): Primary | ICD-10-CM

## 2020-12-09 DIAGNOSIS — I10 BENIGN ESSENTIAL HYPERTENSION: ICD-10-CM

## 2020-12-09 DIAGNOSIS — E78.2 MIXED HYPERLIPIDEMIA: ICD-10-CM

## 2020-12-09 DIAGNOSIS — Z79.4 CONTROLLED TYPE 2 DIABETES MELLITUS WITH COMPLICATION, WITH LONG-TERM CURRENT USE OF INSULIN (H): ICD-10-CM

## 2020-12-09 DIAGNOSIS — Z79.4 CONTROLLED TYPE 2 DIABETES MELLITUS WITH COMPLICATION, WITH LONG-TERM CURRENT USE OF INSULIN (H): Primary | ICD-10-CM

## 2020-12-09 DIAGNOSIS — N18.2 CKD (CHRONIC KIDNEY DISEASE) STAGE 2, GFR 60-89 ML/MIN: ICD-10-CM

## 2020-12-09 DIAGNOSIS — Z23 NEED FOR IMMUNIZATION AGAINST INFLUENZA: ICD-10-CM

## 2020-12-09 LAB
ALBUMIN SERPL-MCNC: 4.7 G/DL (ref 3.5–5.7)
ALBUMIN UR-MCNC: NEGATIVE MG/DL
ALP SERPL-CCNC: 70 U/L (ref 34–104)
ALT SERPL W P-5'-P-CCNC: 18 U/L (ref 7–52)
ANION GAP SERPL CALCULATED.3IONS-SCNC: 9 MMOL/L (ref 3–14)
APPEARANCE UR: CLEAR
AST SERPL W P-5'-P-CCNC: 20 U/L (ref 13–39)
BILIRUB SERPL-MCNC: 0.7 MG/DL (ref 0.3–1)
BILIRUB UR QL STRIP: NEGATIVE
BUN SERPL-MCNC: 19 MG/DL (ref 7–25)
CALCIUM SERPL-MCNC: 9.6 MG/DL (ref 8.6–10.3)
CHLORIDE SERPL-SCNC: 101 MMOL/L (ref 98–107)
CHOLEST SERPL-MCNC: 128 MG/DL
CO2 SERPL-SCNC: 27 MMOL/L (ref 21–31)
COLOR UR AUTO: YELLOW
CREAT SERPL-MCNC: 1 MG/DL (ref 0.7–1.3)
CREAT UR-MCNC: 27 MG/DL
ERYTHROCYTE [DISTWIDTH] IN BLOOD BY AUTOMATED COUNT: 13.2 % (ref 10–15)
GFR SERPL CREATININE-BSD FRML MDRD: 75 ML/MIN/{1.73_M2}
GLUCOSE SERPL-MCNC: 91 MG/DL (ref 70–105)
GLUCOSE UR STRIP-MCNC: NEGATIVE MG/DL
HBA1C MFR BLD: 7.5 % (ref 4–6)
HCT VFR BLD AUTO: 47.4 % (ref 40–53)
HDLC SERPL-MCNC: 45 MG/DL (ref 23–92)
HGB BLD-MCNC: 15.6 G/DL (ref 13.3–17.7)
HGB UR QL STRIP: NEGATIVE
KETONES UR STRIP-MCNC: NEGATIVE MG/DL
LDLC SERPL CALC-MCNC: 70 MG/DL
LEUKOCYTE ESTERASE UR QL STRIP: NEGATIVE
MCH RBC QN AUTO: 29 PG (ref 26.5–33)
MCHC RBC AUTO-ENTMCNC: 32.9 G/DL (ref 31.5–36.5)
MCV RBC AUTO: 88 FL (ref 78–100)
MICROALBUMIN UR-MCNC: 24 MG/L
MICROALBUMIN/CREAT UR: 87.04 MG/G CR (ref 0–17)
NITRATE UR QL: NEGATIVE
NONHDLC SERPL-MCNC: 83 MG/DL
PH UR STRIP: 7 PH (ref 5–7)
PLATELET # BLD AUTO: 257 10E9/L (ref 150–450)
POTASSIUM SERPL-SCNC: 3.9 MMOL/L (ref 3.5–5.1)
PROT SERPL-MCNC: 7.7 G/DL (ref 6.4–8.9)
RBC # BLD AUTO: 5.38 10E12/L (ref 4.4–5.9)
SODIUM SERPL-SCNC: 137 MMOL/L (ref 134–144)
SOURCE: NORMAL
SP GR UR STRIP: 1.01 (ref 1–1.03)
T4 FREE SERPL-MCNC: 0.86 NG/DL (ref 0.6–1.6)
TRIGL SERPL-MCNC: 67 MG/DL
TSH SERPL DL<=0.05 MIU/L-ACNC: 6.56 IU/ML (ref 0.34–5.6)
UROBILINOGEN UR STRIP-MCNC: NORMAL MG/DL (ref 0–2)
WBC # BLD AUTO: 8.7 10E9/L (ref 4–11)

## 2020-12-09 PROCEDURE — 83036 HEMOGLOBIN GLYCOSYLATED A1C: CPT | Mod: ZL | Performed by: INTERNAL MEDICINE

## 2020-12-09 PROCEDURE — G0463 HOSPITAL OUTPT CLINIC VISIT: HCPCS | Mod: 25

## 2020-12-09 PROCEDURE — 84439 ASSAY OF FREE THYROXINE: CPT | Mod: ZL | Performed by: INTERNAL MEDICINE

## 2020-12-09 PROCEDURE — 90662 IIV NO PRSV INCREASED AG IM: CPT

## 2020-12-09 PROCEDURE — G0463 HOSPITAL OUTPT CLINIC VISIT: HCPCS

## 2020-12-09 PROCEDURE — 81003 URINALYSIS AUTO W/O SCOPE: CPT | Mod: ZL | Performed by: INTERNAL MEDICINE

## 2020-12-09 PROCEDURE — 85027 COMPLETE CBC AUTOMATED: CPT | Mod: ZL | Performed by: INTERNAL MEDICINE

## 2020-12-09 PROCEDURE — 82043 UR ALBUMIN QUANTITATIVE: CPT | Mod: ZL | Performed by: INTERNAL MEDICINE

## 2020-12-09 PROCEDURE — 84443 ASSAY THYROID STIM HORMONE: CPT | Mod: ZL | Performed by: INTERNAL MEDICINE

## 2020-12-09 PROCEDURE — 80053 COMPREHEN METABOLIC PANEL: CPT | Mod: ZL | Performed by: INTERNAL MEDICINE

## 2020-12-09 PROCEDURE — 99214 OFFICE O/P EST MOD 30 MIN: CPT | Performed by: INTERNAL MEDICINE

## 2020-12-09 PROCEDURE — 36415 COLL VENOUS BLD VENIPUNCTURE: CPT | Mod: ZL | Performed by: INTERNAL MEDICINE

## 2020-12-09 PROCEDURE — 80061 LIPID PANEL: CPT | Mod: ZL | Performed by: INTERNAL MEDICINE

## 2020-12-09 ASSESSMENT — ENCOUNTER SYMPTOMS
CONFUSION: 0
HEMATURIA: 0
DYSURIA: 0
BRUISES/BLEEDS EASILY: 0
DIZZINESS: 0
NUMBNESS: 1
NAUSEA: 0
WHEEZING: 0
COUGH: 0
VOMITING: 0
MYALGIAS: 0
EYE PAIN: 0
PALPITATIONS: 0
LIGHT-HEADEDNESS: 0
FEVER: 0
SHORTNESS OF BREATH: 0
FATIGUE: 0
ABDOMINAL PAIN: 0
AGITATION: 0
ARTHRALGIAS: 0
CHILLS: 0
DIARRHEA: 0

## 2020-12-09 ASSESSMENT — PAIN SCALES - GENERAL: PAINLEVEL: SEVERE PAIN (6)

## 2020-12-09 ASSESSMENT — MIFFLIN-ST. JEOR: SCORE: 1691.22

## 2020-12-09 NOTE — PROGRESS NOTES
Nursing Notes:   Ale Daniels LPN  12/9/2020  8:50 AM  Signed  Chief Complaint   Patient presents with     Diabetes       Medication Reconciliation complete.   Ale Daniels LPN  ..................12/9/2020   8:32 AM     Nursing note reviewed with patient.  Accuracy and completeness verified.   Mr. Delarosa is a 65 year old male who:  Patient presents with:  Diabetes      ICD-10-CM    1. Controlled type 2 diabetes mellitus with complication, with long-term current use of insulin (H)  E11.8     Z79.4    2. Need for immunization against influenza  Z23 [facility] IMM-  FLU VACCINE, INCREASED ANTIGEN, PRESV FREE   3. Benign essential hypertension  I10    4. Mixed hyperlipidemia  E78.2    5. S/P subtotal parathyroidectomy  Z98.890    6. Postprocedural hypoparathyroidism (H)  E89.2    7. CKD (chronic kidney disease) stage 2, GFR 60-89 ml/min  N18.2    8. Vitamin D deficiency  E55.9      HPI  Patient presents for follow-up of multiple issues.    Type 2 diabetes, has diabetic neuropathy and chronic kidney disease stage II.  Continues to utilize glipizide, Metformin, insulin.  Denies hypoglycemia.  Also tolerating Metformin.  No medication side effects reported.  A1c did go up.  Encourage regular exercise, healthy diet.  Reduced oral carbohydrate intake.  Plan for repeat labs in 3 to 4 months.    Flu shot today.    Hypertension, blood pressures are currently well controlled.  Doing well with current medication regimen.  Denies syncope or presyncope.  Continue current dosing.    Stage II chronic kidney disease, creatinine has been stable.  Encouraged NSAID avoidance.    Mixed hyperlipidemia, cholesterol levels are well controlled.  Taking Lipitor 40 mg daily.  No side effects reported.  Continue current dosing.    Postprocedural hypoparathyroidism, history of subtotal parathyroidectomy.  Calcium levels are stable today.    Vitamin D deficiency, continues on oral replacement.  No changes for now.    Was out walking on  "[place] fishing yesterday.  States that he tries to walk at least 2 miles daily.    Functional Capacity: > 4 METS.   Review of Systems   Constitutional: Negative for chills, fatigue and fever.   HENT: Negative for congestion and hearing loss.    Eyes: Negative for pain and visual disturbance.   Respiratory: Negative for cough, shortness of breath and wheezing.    Cardiovascular: Negative for chest pain and palpitations.   Gastrointestinal: Negative for abdominal pain, diarrhea, nausea and vomiting.   Endocrine: Negative for cold intolerance and heat intolerance.   Genitourinary: Negative for dysuria and hematuria.   Musculoskeletal: Negative for arthralgias and myalgias.        Right 4th finger, in palm. starting to develop trigger finger vs contracture   Skin: Negative for pallor.   Allergic/Immunologic: Negative for immunocompromised state.   Neurological: Positive for numbness (+ bilateral feet, comes and goes -- better with better glucose control). Negative for dizziness and light-headedness.   Hematological: Does not bruise/bleed easily.   Psychiatric/Behavioral: Negative for agitation and confusion.      Reviewed and updated as needed this visit by Provider   Tobacco  Allergies  Meds  Problems  Med Hx  Surg Hx  Fam Hx          EXAM:   Vitals:    12/09/20 0835   BP: 132/72   BP Location: Right arm   Patient Position: Sitting   Cuff Size: Adult Regular   Pulse: 93   Resp: 16   Temp: 96.6  F (35.9  C)   TempSrc: Tympanic   SpO2: 98%   Weight: 90.8 kg (200 lb 3.2 oz)   Height: 1.765 m (5' 9.49\")       Current Pain Score: Severe Pain (6)     BP Readings from Last 3 Encounters:   12/09/20 132/72   02/06/20 130/82   11/06/19 126/60      Wt Readings from Last 3 Encounters:   12/09/20 90.8 kg (200 lb 3.2 oz)   02/06/20 88.1 kg (194 lb 4 oz)   11/06/19 89.6 kg (197 lb 8 oz)      Estimated body mass index is 29.15 kg/m  as calculated from the following:    Height as of this encounter: 1.765 m (5' 9.49\").    Weight as " of this encounter: 90.8 kg (200 lb 3.2 oz).     Physical Exam  Constitutional:       General: He is not in acute distress.     Appearance: He is well-developed. He is not diaphoretic.   HENT:      Head: Normocephalic and atraumatic.   Eyes:      General: No scleral icterus.     Conjunctiva/sclera: Conjunctivae normal.   Neck:      Musculoskeletal: Neck supple.      Vascular: No carotid bruit.   Cardiovascular:      Rate and Rhythm: Normal rate and regular rhythm.      Pulses: Normal pulses.   Pulmonary:      Effort: Pulmonary effort is normal.      Breath sounds: Normal breath sounds.   Abdominal:      Palpations: Abdomen is soft.      Tenderness: There is no abdominal tenderness.   Musculoskeletal:         General: Tenderness (right 4th finger, in palm. starting to develop trigger finger vs contracture  ) present. No deformity.      Right lower leg: No edema.      Left lower leg: No edema.   Lymphadenopathy:      Cervical: No cervical adenopathy.   Skin:     General: Skin is warm and dry.      Findings: No rash.   Neurological:      Mental Status: He is alert and oriented to person, place, and time. Mental status is at baseline.   Psychiatric:         Mood and Affect: Mood normal.         Behavior: Behavior normal.          Procedures   INVESTIGATIONS:  - Labs reviewed in [facility]     ASSESSMENT AND PLAN:  Problem List Items Addressed This Visit        Digestive    Vitamin D deficiency       Endocrine    Controlled type 2 diabetes mellitus with complication, with long-term current use of insulin (H) - Primary    Mixed hyperlipidemia    Postprocedural hypoparathyroidism (H)       Circulatory    Benign essential hypertension       Urinary    CKD (chronic kidney disease) stage 2, GFR 60-89 ml/min       Other    S/P subtotal parathyroidectomy      Other Visit Diagnoses     Need for immunization against influenza        Relevant Orders    [facility] IMM-  FLU VACCINE, INCREASED ANTIGEN, PRESV FREE (Completed)        reviewed diet,  exercise and weight control, recommended sodium restriction, cardiovascular risk and specific lipid/LDL goals reviewed, specific diabetic recommendations low cholesterol diet, weight control and daily exercise discussed, use of aspirin to prevent MI and TIA's discussed  -- Expected clinical course discussed    -- Medications and their side effects discussed    Patient Instructions     Blood pressure is well controlled.   Diabetes is well controlled.     Labs are stable.     Aspects of Diabetes:   Recent Labs   Lab Test 12/09/20  0737 02/06/20  0714 11/06/19  0721   A1C 7.5* 7.0* 7.4*   LDL 70 68 55   HDL 45 40 41   TRIG 67 67 77   ALT 18 17 20   CR 1.00 1.02 0.92   GFRESTIMATED 75 73 83   GFRESTBLACK >90 89 >90   POTASSIUM 3.9 4.4 4.5      Hemoglobin A1c  Goal range is under 8%. Best is 6.5 to 7   Blood Pressure 132/72 Goal to keep less than 140/90   Tobacco  reports that he has never smoked. He has never used smokeless tobacco. Goal to abstain from tobacco   Aspirin or Plavix Anti-platelet therapy Aspirin or Plavix reduces risk of heart disease and stroke  -- sometimes used with other blood thinners, depending on bleeding risk and risk factors.    ACE/ARB Specific blood pressure meds These medications can reduce risk of kidney disease   Cholesterol Statins (Lipitor, Crestor, vs others) Statins reduce risk of heart disease and stroke   Eye Exam -- Do Yearly -- Annual diabetic eye exam   Healthy weight Wt Readings from Last 3 Encounters:   12/09/20 90.8 kg (200 lb 3.2 oz)   02/06/20 88.1 kg (194 lb 4 oz)   11/06/19 89.6 kg (197 lb 8 oz)     Body mass index is 29.15 kg/m .  Goal BMI under 30, best is under 25.      -- Trying to exercise daily (goal at least 20 min/day) with moderate aerobic activity   -- Eat healthy (resources from ADA at http://www.diabetes.org/)   -- Taking good care of my feet. Consider seeing the Podiatrist   -- Check blood sugars as directed, record in log book and bring to every  appointment    Insurance companies are grading you and I on your blood sugar control -- Goal is to get your A1c down to 7.9% or lower and NO Smoking!  -- Medicare and most insurance companies, will only cover Hemoglobin A1c labs to be rechecked every 91+ days.      Return for Diabetes labs and clinic follow-up appointment every 3 to 4 months.  --- (Go for about 91 to 100 days)  Schedule lab only appointment --- A few days AFTER: 3/9/21   Schedule clinic appointment with Dr. Wilson -- Same day as labs, or 1-2 days later.       Moshe Wilson MD   Internal Medicine  [facility]     Portions of this note were dictated using speech recognition software. The note has been proofread but errors in the text may have been overlooked. Please contact me if there are any concerns regarding the accuracy of the dictation.

## 2020-12-09 NOTE — NURSING NOTE
Chief Complaint   Patient presents with     Diabetes       Medication Reconciliation complete.   Ale Daniels LPN  ..................12/9/2020   8:32 AM

## 2020-12-09 NOTE — PROGRESS NOTES
Immunization Documentation    Prior to Immunization administration, verified patients identity using patient's name and date of birth. Please see IMMUNIZATIONS  and order for additional information.  Patient / Parent instructed to remain in clinic for 15 minutes and report any adverse reaction to staff immediately.    Was entire vial of medication used? Yes  Vial/Syringe: Syringe    Ale Daniels LPN  12/9/2020   8:51 AM

## 2020-12-09 NOTE — PATIENT INSTRUCTIONS
Blood pressure is well controlled.   Diabetes is well controlled.     Labs are stable.     Aspects of Diabetes:   Recent Labs   Lab Test 12/09/20  0737 02/06/20  0714 11/06/19  0721   A1C 7.5* 7.0* 7.4*   LDL 70 68 55   HDL 45 40 41   TRIG 67 67 77   ALT 18 17 20   CR 1.00 1.02 0.92   GFRESTIMATED 75 73 83   GFRESTBLACK >90 89 >90   POTASSIUM 3.9 4.4 4.5      Hemoglobin A1c  Goal range is under 8%. Best is 6.5 to 7   Blood Pressure 132/72 Goal to keep less than 140/90   Tobacco  reports that he has never smoked. He has never used smokeless tobacco. Goal to abstain from tobacco   Aspirin or Plavix Anti-platelet therapy Aspirin or Plavix reduces risk of heart disease and stroke  -- sometimes used with other blood thinners, depending on bleeding risk and risk factors.    ACE/ARB Specific blood pressure meds These medications can reduce risk of kidney disease   Cholesterol Statins (Lipitor, Crestor, vs others) Statins reduce risk of heart disease and stroke   Eye Exam -- Do Yearly -- Annual diabetic eye exam   Healthy weight Wt Readings from Last 3 Encounters:   12/09/20 90.8 kg (200 lb 3.2 oz)   02/06/20 88.1 kg (194 lb 4 oz)   11/06/19 89.6 kg (197 lb 8 oz)     Body mass index is 29.15 kg/m .  Goal BMI under 30, best is under 25.      -- Trying to exercise daily (goal at least 20 min/day) with moderate aerobic activity   -- Eat healthy (resources from ADA at http://www.diabetes.org/)   -- Taking good care of my feet. Consider seeing the Podiatrist   -- Check blood sugars as directed, record in log book and bring to every appointment    Insurance companies are grading you and I on your blood sugar control -- Goal is to get your A1c down to 7.9% or lower and NO Smoking!  -- Medicare and most insurance companies, will only cover Hemoglobin A1c labs to be rechecked every 91+ days.      Return for Diabetes labs and clinic follow-up appointment every 3 to 4 months.  --- (Go for about 91 to 100 days)  Schedule lab only  appointment --- A few days AFTER: 3/9/21   Schedule clinic appointment with Dr. Wilson -- Same day as labs, or 1-2 days later.

## 2020-12-14 DIAGNOSIS — E11.8 CONTROLLED TYPE 2 DIABETES MELLITUS WITH COMPLICATION, WITH LONG-TERM CURRENT USE OF INSULIN (H): ICD-10-CM

## 2020-12-14 DIAGNOSIS — Z79.4 CONTROLLED TYPE 2 DIABETES MELLITUS WITH COMPLICATION, WITH LONG-TERM CURRENT USE OF INSULIN (H): ICD-10-CM

## 2020-12-14 NOTE — TELEPHONE ENCOUNTER
[id number] sent Rx request for the following:   insulin glargine (LANTUS SOLOSTAR) 100 UNIT/ML pen  Sig: INJECT 15-25 UNITS SUBCUTANEOUSLY BEFORE BEDTIME.    Last Prescription Date:   09/28/2020  Last Fill Qty/Refills:         15 mL, R-0    Last Office Visit:              12/09/2020 (Steve)   Future Office visit:           02/11/2021 (Steve)   Long Acting Insulin Protocol Passed - 12/14/2020  7:07 AM     Prescription approved per G Refill Protocol.  Beatris Colmenares RN ....................  12/14/2020   11:07 AM

## 2020-12-17 DIAGNOSIS — Z79.4 CONTROLLED TYPE 2 DIABETES MELLITUS WITH COMPLICATION, WITH LONG-TERM CURRENT USE OF INSULIN (H): ICD-10-CM

## 2020-12-17 DIAGNOSIS — E11.8 CONTROLLED TYPE 2 DIABETES MELLITUS WITH COMPLICATION, WITH LONG-TERM CURRENT USE OF INSULIN (H): ICD-10-CM

## 2020-12-17 RX ORDER — CALCIUM CITRATE/VITAMIN D3 200MG-6.25
TABLET ORAL
Qty: 400 EACH | Refills: 5 | Status: SHIPPED | OUTPATIENT
Start: 2020-12-17 | End: 2021-05-26

## 2020-12-17 NOTE — TELEPHONE ENCOUNTER
blood glucose (NO BRAND SPECIFIED) test strip  Diabetic Supplies Protocol Passed    Prescription refilled per RN Medication Refill Policy.................... Ludivina Cazares RN ....................  12/17/2020   3:30 PM

## 2021-02-03 DIAGNOSIS — E78.2 MIXED HYPERLIPIDEMIA: ICD-10-CM

## 2021-02-03 RX ORDER — ATORVASTATIN CALCIUM 40 MG/1
40 TABLET, FILM COATED ORAL DAILY
Qty: 90 TABLET | Refills: 3 | Status: SHIPPED | OUTPATIENT
Start: 2021-02-03 | End: 2022-02-01

## 2021-02-03 NOTE — TELEPHONE ENCOUNTER
[id number] sent Rx request for the following:   atorvastatin (LIPITOR) 40 MG tablet  Sig: Take 1 tablet (40 mg) by mouth daily - Oral    Last Prescription Date:   06/24/2020  Last Fill Qty/Refills:         90, R-1    Last Office Visit:              12/09/2020   Future Office visit:           02/11/2021  Statins Protocol Passed    Prescription refilled per RN Medication Refill Policy.................... Ludivina Cazares RN ....................  2/3/2021   12:21 PM

## 2021-02-11 ENCOUNTER — OFFICE VISIT (OUTPATIENT)
Dept: INTERNAL MEDICINE | Facility: OTHER | Age: 66
End: 2021-02-11
Attending: INTERNAL MEDICINE
Payer: COMMERCIAL

## 2021-02-11 VITALS
WEIGHT: 200.4 LBS | BODY MASS INDEX: 28.69 KG/M2 | DIASTOLIC BLOOD PRESSURE: 88 MMHG | HEART RATE: 99 BPM | OXYGEN SATURATION: 97 % | SYSTOLIC BLOOD PRESSURE: 138 MMHG | TEMPERATURE: 97.1 F | HEIGHT: 70 IN | RESPIRATION RATE: 16 BRPM

## 2021-02-11 DIAGNOSIS — E11.8 CONTROLLED TYPE 2 DIABETES MELLITUS WITH COMPLICATION, WITH LONG-TERM CURRENT USE OF INSULIN (H): Primary | ICD-10-CM

## 2021-02-11 DIAGNOSIS — E89.2 POSTPROCEDURAL HYPOPARATHYROIDISM (H): ICD-10-CM

## 2021-02-11 DIAGNOSIS — Z71.85 VACCINE COUNSELING: ICD-10-CM

## 2021-02-11 DIAGNOSIS — I10 BENIGN ESSENTIAL HYPERTENSION: ICD-10-CM

## 2021-02-11 DIAGNOSIS — Z79.4 CONTROLLED TYPE 2 DIABETES MELLITUS WITH COMPLICATION, WITH LONG-TERM CURRENT USE OF INSULIN (H): Primary | ICD-10-CM

## 2021-02-11 DIAGNOSIS — E55.9 VITAMIN D DEFICIENCY: ICD-10-CM

## 2021-02-11 DIAGNOSIS — E78.2 MIXED HYPERLIPIDEMIA: ICD-10-CM

## 2021-02-11 DIAGNOSIS — Z00.00 ENCOUNTER FOR MEDICARE ANNUAL WELLNESS EXAM: ICD-10-CM

## 2021-02-11 DIAGNOSIS — N18.2 CKD (CHRONIC KIDNEY DISEASE) STAGE 2, GFR 60-89 ML/MIN: ICD-10-CM

## 2021-02-11 PROCEDURE — G0439 PPPS, SUBSEQ VISIT: HCPCS | Performed by: INTERNAL MEDICINE

## 2021-02-11 PROCEDURE — G0463 HOSPITAL OUTPT CLINIC VISIT: HCPCS

## 2021-02-11 RX ORDER — LANCETS 28 GAUGE
EACH MISCELLANEOUS
Qty: 100 EACH | Refills: 3 | Status: SHIPPED | OUTPATIENT
Start: 2021-02-11 | End: 2021-05-26

## 2021-02-11 RX ORDER — ACETAMINOPHEN 160 MG
TABLET,DISINTEGRATING ORAL
Qty: 180 CAPSULE | Refills: 3 | Status: SHIPPED | OUTPATIENT
Start: 2021-02-11 | End: 2022-03-02

## 2021-02-11 ASSESSMENT — ENCOUNTER SYMPTOMS
AGITATION: 0
LIGHT-HEADEDNESS: 0
HEMATURIA: 0
WHEEZING: 0
EYE PAIN: 0
FEVER: 0
ARTHRALGIAS: 0
DIZZINESS: 0
NUMBNESS: 1
NAUSEA: 0
FATIGUE: 0
DIARRHEA: 0
VOMITING: 0
CHILLS: 0
COUGH: 0
ABDOMINAL PAIN: 0
DYSURIA: 0
BRUISES/BLEEDS EASILY: 0
MYALGIAS: 0
CONFUSION: 0
PALPITATIONS: 0
SHORTNESS OF BREATH: 0

## 2021-02-11 ASSESSMENT — MIFFLIN-ST. JEOR: SCORE: 1693.39

## 2021-02-11 ASSESSMENT — PAIN SCALES - GENERAL: PAINLEVEL: SEVERE PAIN (7)

## 2021-02-11 NOTE — PROGRESS NOTES
"SUBJECTIVE:   Oh Delarosa is a 66 year old male who presents for Preventive Visit.  Patient has been advised of split billing requirements and indicates understanding: Yes  Are you in the first 12 months of your Medicare Part B coverage?  No    Physical Health:    In general, how would you rate your overall physical health? good    Outside of work, how many days during the week do you exercise? 6-7 days/week    Outside of work, approximately how many minutes a day do you exercise?greater than 60 minutes    If you drink alcohol do you typically have >3 drinks per day or >7 drinks per week? Not Applicable    Do you usually eat at least 4 servings of fruit and vegetables a day, include whole grains & fiber and avoid regularly eating high fat or \"junk\" foods? Yes    Do you have any problems taking medications regularly?  No    Do you have any side effects from medications? none    Needs assistance for the following daily activities: no assistance needed    Which of the following safety concerns are present in your home?  none identified     Hearing impairment: No    In the past 6 months, have you been bothered by leaking of urine? no    Mental Health:    In general, how would you rate your overall mental or emotional health? good  PHQ-2 Score: 0    Do you feel safe in your environment? Yes    Have you ever done Advance Care Planning? (For example, a Health Directive, POLST, or a discussion with a medical provider or your loved ones about your wishes): No, advance care planning information given to patient to review.  Advanced care planning was discussed at today's visit.    Additional concerns to address?  No    Fall risk:  Fallen 2 or more times in the past year?: No  Any fall with injury in the past year?: No    Cognitive Screenin) Repeat 3 items (Leader, Season, Table)    2) Clock draw: NORMAL  3) 3 item recall: Recalls 3 objects  Results: 3 items recalled: COGNITIVE IMPAIRMENT LESS LIKELY    Mini-CogTM " Copyright JEANNIE Knight. Licensed by the author for use in [facility]; reprinted with permission (elza@[facility]). All rights reserved.      Do you have sleep apnea, excessive snoring or daytime drowsiness?: no    Reviewed and updated as needed this visit by clinical staff  Tobacco  Allergies  Meds  Problems  Med Hx  Surg Hx  Fam Hx  Soc Hx          Reviewed and updated as needed this visit by Provider  Tobacco  Allergies  Meds  Problems  Med Hx  Surg Hx  Fam Hx         Social History     Tobacco Use     Smoking status: Never Smoker     Smokeless tobacco: Never Used   Substance Use Topics     Alcohol use: No                           Current providers sharing in care for this patient include:   Patient Care Team:  Moshe Wilson MD as PCP - General (Internal Medicine)  Moshe Wilson MD as Assigned PCP    The following health maintenance items are reviewed in Epic and correct as of today:  Health Maintenance   Topic Date Due     ZOSTER IMMUNIZATION (1 of 2) 01/25/2005     Pneumococcal Vaccine: 65+ Years (1 of 1 - PPSV23) 01/25/2020     A1C  06/09/2021     BMP  12/09/2021     LIPID  12/09/2021     MICROALBUMIN  12/09/2021     FALL RISK ASSESSMENT  12/09/2021     MEDICARE ANNUAL WELLNESS VISIT  02/11/2022     DIABETIC FOOT EXAM  02/11/2022     EYE EXAM  02/11/2022     DTAP/TDAP/TD IMMUNIZATION (2 - Td) 11/25/2023     ADVANCE CARE PLANNING  02/11/2026     COLORECTAL CANCER SCREENING  11/28/2027     PHQ-2  Completed     INFLUENZA VACCINE  Completed     AORTIC ANEURYSM SCREENING (SYSTEM ASSIGNED)  Completed     HEPATITIS C SCREENING  Addressed     Pneumococcal Vaccine: Pediatrics (0 to 5 Years) and At-Risk Patients (6 to 64 Years)  Aged Out     IPV IMMUNIZATION  Aged Out     MENINGITIS IMMUNIZATION  Aged Out     Review of Systems   Constitutional: Negative for chills, fatigue and fever.   HENT: Negative for congestion and hearing loss.    Eyes: Negative for pain and visual disturbance.  "  Respiratory: Negative for cough, shortness of breath and wheezing.    Cardiovascular: Negative for chest pain and palpitations.   Gastrointestinal: Negative for abdominal pain, diarrhea, nausea and vomiting.   Endocrine: Negative for cold intolerance and heat intolerance.   Genitourinary: Negative for dysuria and hematuria.   Musculoskeletal: Negative for arthralgias and myalgias.        Right 4th finger, in palm. starting to develop trigger finger vs contracture   Skin: Negative for pallor.   Allergic/Immunologic: Negative for immunocompromised state.   Neurological: Positive for numbness (+ bilateral feet, comes and goes -- better with better glucose control). Negative for dizziness and light-headedness.   Hematological: Does not bruise/bleed easily.   Psychiatric/Behavioral: Negative for agitation and confusion.       OBJECTIVE:   /88 (BP Location: Right arm, Patient Position: Sitting, Cuff Size: Adult Regular)   Pulse 99   Temp 97.1  F (36.2  C) (Tympanic)   Resp 16   Ht 1.775 m (5' 9.88\")   Wt 90.9 kg (200 lb 6.4 oz)   SpO2 97%   BMI 28.85 kg/m   Estimated body mass index is 28.85 kg/m  as calculated from the following:    Height as of this encounter: 1.775 m (5' 9.88\").    Weight as of this encounter: 90.9 kg (200 lb 6.4 oz).    Physical Exam  Constitutional:       General: He is not in acute distress.     Appearance: He is well-developed. He is not diaphoretic.   HENT:      Head: Normocephalic and atraumatic.   Eyes:      General: No scleral icterus.     Conjunctiva/sclera: Conjunctivae normal.   Neck:      Musculoskeletal: Neck supple.      Vascular: No carotid bruit.   Cardiovascular:      Rate and Rhythm: Normal rate and regular rhythm.      Pulses: Normal pulses.   Pulmonary:      Effort: Pulmonary effort is normal.      Breath sounds: Normal breath sounds.   Abdominal:      Palpations: Abdomen is soft.      Tenderness: There is no abdominal tenderness.   Musculoskeletal:         General: " Tenderness (right 4th finger, in palm. starting to develop trigger finger vs contracture  ) present. No deformity.      Right lower leg: No edema.      Left lower leg: No edema.   Lymphadenopathy:      Cervical: No cervical adenopathy.   Skin:     General: Skin is warm and dry.      Findings: No rash.   Neurological:      Mental Status: He is alert and oriented to person, place, and time. Mental status is at baseline.   Psychiatric:         Mood and Affect: Mood normal.         Behavior: Behavior normal.        Diagnostic Test Results:  Labs reviewed in [facility]  Recent Labs   Lab Test 12/09/20  0737 02/06/20  0714 11/06/19  0721   A1C 7.5* 7.0* 7.4*   LDL 70 68 55   HDL 45 40 41   TRIG 67 67 77   ALT 18 17 20   CR 1.00 1.02 0.92   GFRESTIMATED 75 73 83   GFRESTBLACK >90 89 >90   POTASSIUM 3.9 4.4 4.5      ASSESSMENT / PLAN:       ICD-10-CM    1. Controlled type 2 diabetes mellitus with complication, with long-term current use of insulin (H)  E11.8 blood glucose monitoring (FREESTYLE) lancets    Z79.4 insulin glargine (LANTUS SOLOSTAR) 100 UNIT/ML pen   2. Encounter for Medicare annual wellness exam  Z00.00    3. Postprocedural hypoparathyroidism (H)  E89.2    4. Vitamin D deficiency  E55.9 Cholecalciferol (VITAMIN D3) 50 MCG (2000 UT) CAPS   5. Benign essential hypertension  I10    6. Mixed hyperlipidemia  E78.2    7. CKD (chronic kidney disease) stage 2, GFR 60-89 ml/min  N18.2    8. Vaccine counseling  Z71.89    Patient presents for Medicare annual wellness visit as well as follow-up multiple issues.    Type 2 diabetes, currently well controlled.  Last labs were checked in early December.  A1c was 7.5%.  Unfortunately his hemoglobin A1c was going up.  States that his blood sugars at home have been doing better.  Currently on Lantus.  Needs refills.  Takes oral glipizide and Metformin.  Denies hypoglycemia.  --Pending upcoming lab results and ongoing blood sugar testing at home, we may need to add or adjust his  "diabetic medications.  --Has only been using the glipizide-Metformin combination tablet daily as needed.    Postprocedural hypoparathyroidism, continues with calcium supplement.  Calcium levels are stable.    Vitamin D deficiency, continues with oral replacement.  Needs refills.    Hypertension, blood pressure is currently well controlled.  Doing well with current medication regimen.  Denies syncope or presyncope.    Mixed hyperlipidemia, cholesterol levels have improved with use of Lipitor, currently 40 mg daily.  Continue current dosing.  Plan for lab recheck in early April.    Stage II chronic kidney disease, kidney function actually improved slightly with his last lab work.  Recheck in a couple months.  Encouraged NSAID avoidance.    Encouraged him to get his COVID-19 vaccinations completed when able.    He will also check at the pharmacy for the cost and coverage of shingles vaccine and pneumococcal vaccine.    Patient has been advised of split billing requirements and indicates understanding: Yes    COUNSELING:  Reviewed preventive health counseling, as reflected in patient instructions  Special attention given to:       Consider AAA screening for ages 65-75 and smoking history       Regular exercise       Healthy diet/nutrition       Vision screening       Hearing screening       Dental care       Bladder control       Fall risk prevention       Immunizations    Estimated body mass index is 28.85 kg/m  as calculated from the following:    Height as of this encounter: 1.775 m (5' 9.88\").    Weight as of this encounter: 90.9 kg (200 lb 6.4 oz).        He reports that he has never smoked. He has never used smokeless tobacco.    Appropriate preventive services were discussed with this patient, including applicable screening as appropriate for cardiovascular disease, diabetes, osteopenia/osteoporosis, and glaucoma.  As appropriate for age/gender, discussed screening for colorectal cancer, prostate cancer, breast " cancer, and cervical cancer. Checklist reviewing preventive services available has been given to the patient.    Reviewed patients plan of care and provided an AVS. The Complex Care Plan (for patients with higher acuity and needing more deliberate coordination of services) for Oh meets the Care Plan requirement. This Care Plan has been established and reviewed with the Patient.    Counseling Resources:  ATP IV Guidelines  Pooled Cohorts Equation Calculator  Breast Cancer Risk Calculator  BRCA-Related Cancer Risk Assessment: FHS-7 Tool  FRAX Risk Assessment  ICSI Preventive Guidelines  Dietary Guidelines for Americans, 2010  USDA's MyPlate  ASA Prophylaxis  Lung CA Screening    Moshe Wilson MD  [facility]

## 2021-02-11 NOTE — PATIENT INSTRUCTIONS
Patient Education   Personalized Prevention Plan  You are due for the preventive services outlined below.  Your care team is available to assist you in scheduling these services.  If you have already completed any of these items, please share that information with your care team to update in your medical record.  Health Maintenance Due   Topic Date Due     Zoster (Shingles) Vaccine (1 of 2) 01/25/2005     Pneumococcal Vaccine (1 of 1 - PPSV23) 01/25/2020     Eye Exam  12/01/2020     Diabetic Foot Exam  02/06/2021     Preventive Health Recommendations  See your health care provider every year to    Review health changes.     Discuss preventive care.      Review your medicines if your doctor has prescribed any.    Talk with your health care provider about whether you should have a test to screen for prostate cancer (PSA).    Every 3 years, have a diabetes test (fasting glucose). If you are at risk for diabetes, you should have this test more often.    Every 5 years, have a cholesterol test. Have this test more often if you are at risk for high cholesterol or heart disease.     Every 10 years, have a colonoscopy. Or, have a yearly FIT test (stool test). These exams will check for colon cancer.    Talk to with your health care provider about screening for Abdominal Aortic Aneurysm if you have a family history of AAA or have a history of smoking.    Shots:     Get a flu shot each year.     Get a tetanus shot every 10 years.     Talk to your doctor about your pneumonia vaccines. There are now two you should receive - Pneumovax (PPSV 23) and Prevnar (PCV 13).    Talk to your pharmacist about a shingles vaccine.     Talk to your doctor about the hepatitis B vaccine.    Nutrition:     Eat at least 5 servings of fruits and vegetables each day.     Eat whole-grain bread, whole-wheat pasta and brown rice instead of white grains and rice.     Get adequate Calcium and Vitamin D.     Lifestyle    Exercise for at least 150  minutes a week (30 minutes a day, 5 days a week). This will help you control your weight and prevent disease.     Limit alcohol to one drink per day.     No smoking.     Wear sunscreen to prevent skin cancer.     See your dentist every six months for an exam and cleaning.     See your eye doctor every 1 to 2 years to screen for conditions such as glaucoma, macular degeneration and cataracts.    Personalized Prevention Plan  You are due for the preventive services outlined below.  Your care team is available to assist you in scheduling these services.  If you have already completed any of these items, please share that information with your care team to update in your medical record.  Health Maintenance   Topic Date Due     ZOSTER IMMUNIZATION (1 of 2) 01/25/2005     Pneumococcal Vaccine: 65+ Years (1 of 1 - PPSV23) 01/25/2020     EYE EXAM  12/01/2020     DIABETIC FOOT EXAM  02/06/2021     A1C  06/09/2021     BMP  12/09/2021     LIPID  12/09/2021     MICROALBUMIN  12/09/2021     FALL RISK ASSESSMENT  12/09/2021     MEDICARE ANNUAL WELLNESS VISIT  02/11/2022     DTAP/TDAP/TD IMMUNIZATION (2 - Td) 11/25/2023     ADVANCE CARE PLANNING  02/11/2026     COLORECTAL CANCER SCREENING  11/28/2027     PHQ-2  Completed     INFLUENZA VACCINE  Completed     AORTIC ANEURYSM SCREENING (SYSTEM ASSIGNED)  Completed     HEPATITIS C SCREENING  Addressed     Pneumococcal Vaccine: Pediatrics (0 to 5 Years) and At-Risk Patients (6 to 64 Years)  Aged Out     IPV IMMUNIZATION  Aged Out     MENINGITIS IMMUNIZATION  Aged Out         Immunization History   Administered Date(s) Administered     Influenza (IIV3) PF 11/02/2006, 10/26/2007     Influenza Vaccine IM > 6 months Valent IIV4 11/06/2019     Influenza, Quad, High Dose, Pf, 65yr + 12/09/2020     TDAP Vaccine (Adacel) 11/25/2013        -- Get the 2 shot COVID-19 Vaccination series (1st shot when available and 2nd shot 17 to 21 days later) -- (No vaccines for 2 weeks prior to Covid  Vaccine)    -- Pneumococcal PCV 23 shot -- when done with the COVID-19 Vaccines.     -- Get your tetanus shot updated - from one of the local pharmacies, at your convenience. --- After November 2023.     -- Get the new shingles shot (2 in series) (Shingrix) - from one of the local pharmacies, at your convenience. -- Check cost/coverage.       Get your eye exam completed sometime soon.  Medications updated.       Blood pressure checks at home - check some in AM, some in Afternoon, some in Evening and record   -- bring these with you to your next appointment.     Goal blood pressures -- less than 140 and less than 90.    -- Ideally would like the numbers about 110-130 and 70-80.  -- If running higher or lower than this on regular basis, will need to adjust your medications.       Aspects of Diabetes:   Recent Labs   Lab Test 12/09/20  0737 02/06/20  0714 11/06/19  0721   A1C 7.5* 7.0* 7.4*   LDL 70 68 55   HDL 45 40 41   TRIG 67 67 77   ALT 18 17 20   CR 1.00 1.02 0.92   GFRESTIMATED 75 73 83   GFRESTBLACK >90 89 >90   POTASSIUM 3.9 4.4 4.5      Hemoglobin A1c  Goal range is under 8%. Best is 6.5 to 7   Blood Pressure 138/88 Goal to keep less than 140/90   Tobacco  reports that he has never smoked. He has never used smokeless tobacco. Goal to abstain from tobacco   Aspirin or Plavix Anti-platelet therapy Aspirin or Plavix reduces risk of heart disease and stroke  -- sometimes used with other blood thinners, depending on bleeding risk and risk factors.    ACE/ARB Specific blood pressure meds These medications can reduce risk of kidney disease   Cholesterol Statins (Lipitor, Crestor, vs others) Statins reduce risk of heart disease and stroke   Eye Exam -- Do Yearly -- Annual diabetic eye exam   Healthy weight Wt Readings from Last 3 Encounters:   02/11/21 90.9 kg (200 lb 6.4 oz)   12/09/20 90.8 kg (200 lb 3.2 oz)   02/06/20 88.1 kg (194 lb 4 oz)     Body mass index is 28.85 kg/m .  Goal BMI under 30, best is under 25.       -- Trying to exercise daily (goal at least 20 min/day) with moderate aerobic activity   -- Eat healthy (resources from ADA at http://www.diabetes.org/)   -- Taking good care of my feet. Consider seeing the Podiatrist   -- Check blood sugars as directed, record in log book and bring to every appointment    Insurance companies are grading you and I on your blood sugar control -- Goal is to get your A1c down to 7.9% or lower and NO Smoking!  -- Medicare and most insurance companies, will only cover Hemoglobin A1c labs to be rechecked every 91+ days.      Return for Diabetes labs and clinic follow-up appointment every 3 to 4 months.  --- (Go for about 91 to 100 days)  Schedule lab only appointment --- A few days AFTER: 5/12/21   Schedule clinic appointment with Dr. Wilson -- Same day as labs, or 1-2 days later.

## 2021-03-02 DIAGNOSIS — E55.9 VITAMIN D DEFICIENCY: ICD-10-CM

## 2021-03-02 RX ORDER — ACETAMINOPHEN 160 MG
TABLET,DISINTEGRATING ORAL
Qty: 180 CAPSULE | Refills: 3 | OUTPATIENT
Start: 2021-03-02

## 2021-04-09 ENCOUNTER — OFFICE VISIT (OUTPATIENT)
Dept: INTERNAL MEDICINE | Facility: OTHER | Age: 66
End: 2021-04-09
Attending: INTERNAL MEDICINE
Payer: MEDICARE

## 2021-04-09 VITALS
OXYGEN SATURATION: 98 % | RESPIRATION RATE: 16 BRPM | SYSTOLIC BLOOD PRESSURE: 138 MMHG | DIASTOLIC BLOOD PRESSURE: 88 MMHG | WEIGHT: 197.2 LBS | HEART RATE: 79 BPM | TEMPERATURE: 97.3 F | BODY MASS INDEX: 28.39 KG/M2

## 2021-04-09 DIAGNOSIS — Z79.4 CONTROLLED TYPE 2 DIABETES MELLITUS WITH COMPLICATION, WITH LONG-TERM CURRENT USE OF INSULIN (H): Primary | ICD-10-CM

## 2021-04-09 DIAGNOSIS — E11.8 CONTROLLED TYPE 2 DIABETES MELLITUS WITH COMPLICATION, WITH LONG-TERM CURRENT USE OF INSULIN (H): ICD-10-CM

## 2021-04-09 DIAGNOSIS — I10 BENIGN ESSENTIAL HYPERTENSION: ICD-10-CM

## 2021-04-09 DIAGNOSIS — E78.2 MIXED HYPERLIPIDEMIA: ICD-10-CM

## 2021-04-09 DIAGNOSIS — Z79.4 CONTROLLED TYPE 2 DIABETES MELLITUS WITH COMPLICATION, WITH LONG-TERM CURRENT USE OF INSULIN (H): ICD-10-CM

## 2021-04-09 DIAGNOSIS — E11.8 CONTROLLED TYPE 2 DIABETES MELLITUS WITH COMPLICATION, WITH LONG-TERM CURRENT USE OF INSULIN (H): Primary | ICD-10-CM

## 2021-04-09 DIAGNOSIS — N18.2 CKD (CHRONIC KIDNEY DISEASE) STAGE 2, GFR 60-89 ML/MIN: ICD-10-CM

## 2021-04-09 LAB
ALBUMIN SERPL-MCNC: 4.5 G/DL (ref 3.5–5.7)
ALBUMIN UR-MCNC: NEGATIVE MG/DL
ALP SERPL-CCNC: 75 U/L (ref 34–104)
ALT SERPL W P-5'-P-CCNC: 16 U/L (ref 7–52)
ANION GAP SERPL CALCULATED.3IONS-SCNC: 8 MMOL/L (ref 3–14)
APPEARANCE UR: CLEAR
AST SERPL W P-5'-P-CCNC: 16 U/L (ref 13–39)
BILIRUB SERPL-MCNC: 0.5 MG/DL (ref 0.3–1)
BILIRUB UR QL STRIP: NEGATIVE
BUN SERPL-MCNC: 15 MG/DL (ref 7–25)
CALCIUM SERPL-MCNC: 9.8 MG/DL (ref 8.6–10.3)
CHLORIDE SERPL-SCNC: 102 MMOL/L (ref 98–107)
CHOLEST SERPL-MCNC: 133 MG/DL
CO2 SERPL-SCNC: 27 MMOL/L (ref 21–31)
COLOR UR AUTO: YELLOW
CREAT SERPL-MCNC: 0.98 MG/DL (ref 0.7–1.3)
CREAT UR-MCNC: 32 MG/DL
ERYTHROCYTE [DISTWIDTH] IN BLOOD BY AUTOMATED COUNT: 13.4 % (ref 10–15)
GFR SERPL CREATININE-BSD FRML MDRD: 77 ML/MIN/{1.73_M2}
GLUCOSE SERPL-MCNC: 209 MG/DL (ref 70–105)
GLUCOSE UR STRIP-MCNC: NEGATIVE MG/DL
HBA1C MFR BLD: 7.6 % (ref 4–6)
HCT VFR BLD AUTO: 46.6 % (ref 40–53)
HDLC SERPL-MCNC: 41 MG/DL (ref 23–92)
HGB BLD-MCNC: 15.7 G/DL (ref 13.3–17.7)
HGB UR QL STRIP: NEGATIVE
KETONES UR STRIP-MCNC: NEGATIVE MG/DL
LDLC SERPL CALC-MCNC: 72 MG/DL
LEUKOCYTE ESTERASE UR QL STRIP: NEGATIVE
MCH RBC QN AUTO: 29.5 PG (ref 26.5–33)
MCHC RBC AUTO-ENTMCNC: 33.7 G/DL (ref 31.5–36.5)
MCV RBC AUTO: 87 FL (ref 78–100)
MICROALBUMIN UR-MCNC: 6 MG/L
MICROALBUMIN/CREAT UR: 18.28 MG/G CR (ref 0–17)
NITRATE UR QL: NEGATIVE
NONHDLC SERPL-MCNC: 92 MG/DL
PH UR STRIP: 6 PH (ref 5–7)
PLATELET # BLD AUTO: 280 10E9/L (ref 150–450)
POTASSIUM SERPL-SCNC: 4.5 MMOL/L (ref 3.5–5.1)
PROT SERPL-MCNC: 7.7 G/DL (ref 6.4–8.9)
RBC # BLD AUTO: 5.33 10E12/L (ref 4.4–5.9)
SODIUM SERPL-SCNC: 137 MMOL/L (ref 134–144)
SOURCE: NORMAL
SP GR UR STRIP: 1.01 (ref 1–1.03)
TRIGL SERPL-MCNC: 100 MG/DL
TSH SERPL DL<=0.05 MIU/L-ACNC: 4.03 IU/ML (ref 0.34–5.6)
UROBILINOGEN UR STRIP-MCNC: NORMAL MG/DL (ref 0–2)
WBC # BLD AUTO: 8.5 10E9/L (ref 4–11)

## 2021-04-09 PROCEDURE — 80053 COMPREHEN METABOLIC PANEL: CPT | Mod: ZL | Performed by: INTERNAL MEDICINE

## 2021-04-09 PROCEDURE — 80061 LIPID PANEL: CPT | Mod: ZL | Performed by: INTERNAL MEDICINE

## 2021-04-09 PROCEDURE — 85027 COMPLETE CBC AUTOMATED: CPT | Mod: ZL | Performed by: INTERNAL MEDICINE

## 2021-04-09 PROCEDURE — 83036 HEMOGLOBIN GLYCOSYLATED A1C: CPT | Mod: ZL | Performed by: INTERNAL MEDICINE

## 2021-04-09 PROCEDURE — 36415 COLL VENOUS BLD VENIPUNCTURE: CPT | Mod: ZL | Performed by: INTERNAL MEDICINE

## 2021-04-09 PROCEDURE — 81003 URINALYSIS AUTO W/O SCOPE: CPT | Mod: ZL | Performed by: INTERNAL MEDICINE

## 2021-04-09 PROCEDURE — 82043 UR ALBUMIN QUANTITATIVE: CPT | Mod: ZL | Performed by: INTERNAL MEDICINE

## 2021-04-09 PROCEDURE — G0463 HOSPITAL OUTPT CLINIC VISIT: HCPCS

## 2021-04-09 PROCEDURE — 84443 ASSAY THYROID STIM HORMONE: CPT | Mod: ZL | Performed by: INTERNAL MEDICINE

## 2021-04-09 PROCEDURE — 99214 OFFICE O/P EST MOD 30 MIN: CPT | Performed by: INTERNAL MEDICINE

## 2021-04-09 RX ORDER — GLIPIZIDE 10 MG/1
10 TABLET ORAL
Qty: 270 TABLET | Refills: 3 | Status: SHIPPED | OUTPATIENT
Start: 2021-04-09 | End: 2022-03-18

## 2021-04-09 ASSESSMENT — ENCOUNTER SYMPTOMS
DIZZINESS: 0
CHILLS: 0
LIGHT-HEADEDNESS: 0
ABDOMINAL PAIN: 0
SHORTNESS OF BREATH: 0
WHEEZING: 0
FEVER: 0
FATIGUE: 0
MYALGIAS: 0
AGITATION: 0
NAUSEA: 0
CONFUSION: 0
PALPITATIONS: 0
HEMATURIA: 0
VOMITING: 0
NUMBNESS: 1
EYE PAIN: 0
DIARRHEA: 0
BRUISES/BLEEDS EASILY: 0
DYSURIA: 0
COUGH: 0
ARTHRALGIAS: 0

## 2021-04-09 ASSESSMENT — PAIN SCALES - GENERAL: PAINLEVEL: SEVERE PAIN (6)

## 2021-04-09 NOTE — PROGRESS NOTES
Mr. Delarosa is a 66 year old male who presents to the clinic for evaluation of the following problems:      ICD-10-CM    1. Controlled type 2 diabetes mellitus with complication, with long-term current use of insulin (H)  E11.8 glipiZIDE (GLUCOTROL) 10 MG tablet    Z79.4    2. Benign essential hypertension  I10 CBC with platelets   3. CKD (chronic kidney disease) stage 2, GFR 60-89 ml/min  N18.2    4. Mixed hyperlipidemia  E78.2      HPI  Type 2 diabetes, has been well controlled.  Labs today show his hemoglobin A1c is slowly climbing.  We will make some medication changes.  Stop the glipizide/Metformin tablet switch to 3 times daily 10 mg glipizide.  He has not had any hypoglycemia issues at all. Continues with Lantus 18 units in the evening.    Hypertension, blood pressures initially elevated, recheck did improve.  Advised to monitor at home if this remains elevated we will need to make medication changes.    Stage II chronic kidney disease, creatinine is stable.  No significant recent changes.  Encourage NSAID avoidance.    Mixed hyperlipidemia, cholesterol levels have improved with use of Lipitor currently 40 mg daily.  No changes for now.  Tolerating well without side effects.    Functional Capacity: > 4 METS.   Review of Systems   Constitutional: Negative for chills, fatigue and fever.   HENT: Negative for congestion and hearing loss.    Eyes: Negative for pain and visual disturbance.   Respiratory: Negative for cough, shortness of breath and wheezing.    Cardiovascular: Negative for chest pain and palpitations.   Gastrointestinal: Negative for abdominal pain, diarrhea, nausea and vomiting.   Endocrine: Negative for cold intolerance and heat intolerance.   Genitourinary: Negative for dysuria and hematuria.   Musculoskeletal: Negative for arthralgias and myalgias.        Right 4th finger, in palm. starting to develop trigger finger vs contracture   Skin: Negative for pallor.   Allergic/Immunologic: Negative for  "immunocompromised state.   Neurological: Positive for numbness (+ bilateral feet, comes and goes -- better with better glucose control). Negative for dizziness and light-headedness.   Hematological: Does not bruise/bleed easily.   Psychiatric/Behavioral: Negative for agitation and confusion.      Reviewed and updated as needed this visit by Provider   Tobacco  Allergies  Meds  Problems  Med Hx  Surg Hx  Fam Hx          EXAM:   Vitals:    04/09/21 1057 04/09/21 1147   BP: (!) 154/90 138/88   BP Location: Right arm    Patient Position: Sitting    Cuff Size: Adult Regular    Pulse: 79    Resp: 16    Temp: 97.3  F (36.3  C)    TempSrc: Tympanic    SpO2: 98%    Weight: 89.4 kg (197 lb 3.2 oz)        Current Pain Score: Severe Pain (6)     BP Readings from Last 3 Encounters:   04/09/21 138/88   02/11/21 138/88   12/09/20 132/72      Wt Readings from Last 3 Encounters:   04/09/21 89.4 kg (197 lb 3.2 oz)   02/11/21 90.9 kg (200 lb 6.4 oz)   12/09/20 90.8 kg (200 lb 3.2 oz)      Estimated body mass index is 28.39 kg/m  as calculated from the following:    Height as of 2/11/21: 1.775 m (5' 9.88\").    Weight as of this encounter: 89.4 kg (197 lb 3.2 oz).     Physical Exam  Constitutional:       General: He is not in acute distress.     Appearance: He is well-developed. He is not diaphoretic.   HENT:      Head: Normocephalic and atraumatic.   Eyes:      General: No scleral icterus.     Conjunctiva/sclera: Conjunctivae normal.   Neck:      Musculoskeletal: Neck supple.      Vascular: No carotid bruit.   Cardiovascular:      Rate and Rhythm: Normal rate and regular rhythm.      Pulses: Normal pulses.   Pulmonary:      Effort: Pulmonary effort is normal.      Breath sounds: Normal breath sounds.   Abdominal:      Palpations: Abdomen is soft.      Tenderness: There is no abdominal tenderness.   Musculoskeletal:         General: Tenderness (right 4th finger, in palm. starting to develop trigger finger vs contracture  ) " present. No deformity.      Right lower leg: No edema.      Left lower leg: No edema.   Lymphadenopathy:      Cervical: No cervical adenopathy.   Skin:     General: Skin is warm and dry.      Findings: No rash.   Neurological:      Mental Status: He is alert and oriented to person, place, and time. Mental status is at baseline.   Psychiatric:         Mood and Affect: Mood normal.         Behavior: Behavior normal.        Procedures   INVESTIGATIONS:  - Labs reviewed in [facility]   Recent Labs   Lab Test 04/09/21  1029 12/09/20  0737 02/06/20  0714   A1C 7.6* 7.5* 7.0*   LDL 72 70 68   HDL 41 45 40   TRIG 100 67 67   ALT 16 18 17   CR 0.98 1.00 1.02   GFRESTIMATED 77 75 73   GFRESTBLACK >90 >90 89   POTASSIUM 4.5 3.9 4.4        ASSESSMENT AND PLAN:  Problem List Items Addressed This Visit        Endocrine    Controlled type 2 diabetes mellitus with complication, with long-term current use of insulin (H) - Primary    Relevant Medications    glipiZIDE (GLUCOTROL) 10 MG tablet    Mixed hyperlipidemia       Circulatory    Benign essential hypertension    Relevant Orders    CBC with platelets (Completed)       Urinary    CKD (chronic kidney disease) stage 2, GFR 60-89 ml/min        reviewed diet, exercise and weight control, recommended sodium restriction, cardiovascular risk and specific lipid/LDL goals reviewed  -- Expected clinical course discussed    -- Medications and their side effects discussed    The 10-year ASCVD risk score (Xiomara PHILLIP Jr., et al., 2013) is: 27.6%    Values used to calculate the score:      Age: 66 years      Sex: Male      Is Non- : No      Diabetic: Yes      Tobacco smoker: No      Systolic Blood Pressure: 138 mmHg      Is BP treated: Yes      HDL Cholesterol: 41 mg/dL      Total Cholesterol: 133 mg/dL    Patient Instructions     1. Controlled type 2 diabetes mellitus with complication, with long-term current use of insulin (H)    Dose increase:    - glipiZIDE (GLUCOTROL) 10 MG  tablet; Take 1 tablet (10 mg) by mouth 3 times daily (with meals) -- Dose Increase 4/9/2021     --- stop combo glipizide/metformin ---      Blood pressure is well controlled.     Aspects of Diabetes:   Recent Labs   Lab Test 04/09/21  1029 12/09/20  0737 02/06/20  0714   A1C 7.6* 7.5* 7.0*   LDL 72 70 68   HDL 41 45 40   TRIG 100 67 67   ALT 16 18 17   CR 0.98 1.00 1.02   GFRESTIMATED 77 75 73   GFRESTBLACK >90 >90 89   POTASSIUM 4.5 3.9 4.4      Hemoglobin A1c  Goal range is under 8%. Best is 6.5 to 7   Blood Pressure 154/90 Goal to keep less than 140/90   Tobacco  reports that he has never smoked. He has never used smokeless tobacco. Goal to abstain from tobacco   Aspirin or Plavix Anti-platelet therapy Aspirin or Plavix reduces risk of heart disease and stroke  -- sometimes used with other blood thinners, depending on bleeding risk and risk factors.    ACE/ARB Specific blood pressure meds These medications can reduce risk of kidney disease   Cholesterol Statins (Lipitor, Crestor, vs others) Statins reduce risk of heart disease and stroke   Eye Exam -- Do Yearly -- Annual diabetic eye exam   Healthy weight Wt Readings from Last 3 Encounters:   04/09/21 89.4 kg (197 lb 3.2 oz)   02/11/21 90.9 kg (200 lb 6.4 oz)   12/09/20 90.8 kg (200 lb 3.2 oz)     Body mass index is 28.39 kg/m .  Goal BMI under 30, best is under 25.      -- Trying to exercise daily (goal at least 20 min/day) with moderate aerobic activity   -- Eat healthy (resources from ADA at http://www.diabetes.org/)   -- Taking good care of my feet. Consider seeing the Podiatrist   -- Check blood sugars as directed, record in log book and bring to every appointment    Insurance companies are grading you and I on your blood sugar control -- Goal is to get your A1c down to 7.9% or lower and NO Smoking!  -- Medicare and most insurance companies, will only cover Hemoglobin A1c labs to be rechecked every 91+ days.      Return for Diabetes labs and clinic follow-up  appointment every 3 to 4 months.    Schedule lab only appointment --- A few days AFTER: 7/8/21   Schedule clinic appointment with Dr. Wilson -- Same day as labs, or 1-2 days later.      Moshe Wilson MD   Internal Medicine  [facility]

## 2021-04-09 NOTE — PATIENT INSTRUCTIONS
1. Controlled type 2 diabetes mellitus with complication, with long-term current use of insulin (H)    Dose increase:    - glipiZIDE (GLUCOTROL) 10 MG tablet; Take 1 tablet (10 mg) by mouth 3 times daily (with meals) -- Dose Increase 4/9/2021     --- stop combo glipizide/metformin ---      Blood pressure is well controlled.     Aspects of Diabetes:   Recent Labs   Lab Test 04/09/21  1029 12/09/20  0737 02/06/20  0714   A1C 7.6* 7.5* 7.0*   LDL 72 70 68   HDL 41 45 40   TRIG 100 67 67   ALT 16 18 17   CR 0.98 1.00 1.02   GFRESTIMATED 77 75 73   GFRESTBLACK >90 >90 89   POTASSIUM 4.5 3.9 4.4      Hemoglobin A1c  Goal range is under 8%. Best is 6.5 to 7   Blood Pressure 154/90 Goal to keep less than 140/90   Tobacco  reports that he has never smoked. He has never used smokeless tobacco. Goal to abstain from tobacco   Aspirin or Plavix Anti-platelet therapy Aspirin or Plavix reduces risk of heart disease and stroke  -- sometimes used with other blood thinners, depending on bleeding risk and risk factors.    ACE/ARB Specific blood pressure meds These medications can reduce risk of kidney disease   Cholesterol Statins (Lipitor, Crestor, vs others) Statins reduce risk of heart disease and stroke   Eye Exam -- Do Yearly -- Annual diabetic eye exam   Healthy weight Wt Readings from Last 3 Encounters:   04/09/21 89.4 kg (197 lb 3.2 oz)   02/11/21 90.9 kg (200 lb 6.4 oz)   12/09/20 90.8 kg (200 lb 3.2 oz)     Body mass index is 28.39 kg/m .  Goal BMI under 30, best is under 25.      -- Trying to exercise daily (goal at least 20 min/day) with moderate aerobic activity   -- Eat healthy (resources from ADA at http://www.diabetes.org/)   -- Taking good care of my feet. Consider seeing the Podiatrist   -- Check blood sugars as directed, record in log book and bring to every appointment    Insurance companies are grading you and I on your blood sugar control -- Goal is to get your A1c down to 7.9% or lower and NO Smoking!  --  Medicare and most insurance companies, will only cover Hemoglobin A1c labs to be rechecked every 91+ days.      Return for Diabetes labs and clinic follow-up appointment every 3 to 4 months.    Schedule lab only appointment --- A few days AFTER: 7/8/21   Schedule clinic appointment with Dr. Wilson -- Same day as labs, or 1-2 days later.

## 2021-05-26 ENCOUNTER — TELEPHONE (OUTPATIENT)
Dept: INTERNAL MEDICINE | Facility: OTHER | Age: 66
End: 2021-05-26

## 2021-05-26 DIAGNOSIS — Z79.4 CONTROLLED TYPE 2 DIABETES MELLITUS WITH COMPLICATION, WITH LONG-TERM CURRENT USE OF INSULIN (H): Primary | ICD-10-CM

## 2021-05-26 DIAGNOSIS — E11.8 CONTROLLED TYPE 2 DIABETES MELLITUS WITH COMPLICATION, WITH LONG-TERM CURRENT USE OF INSULIN (H): Primary | ICD-10-CM

## 2021-06-11 ENCOUNTER — PATIENT OUTREACH (OUTPATIENT)
Dept: INTERNAL MEDICINE | Facility: OTHER | Age: 66
End: 2021-06-11

## 2021-06-11 NOTE — TELEPHONE ENCOUNTER
Patient Quality Outreach      Summary:    Patient has the following on his problem list/HM:   Diabetes    Last A1C:  Lab Results   Component Value Date    A1C 7.6 04/09/2021    A1C 7.5 12/09/2020       Last LDL:    Lab Results   Component Value Date    LDL 72 04/09/2021       Is the patient on a Statin? Yes          Is the patient on Aspirin? Yes    Medications     HMG CoA Reductase Inhibitors     atorvastatin (LIPITOR) 40 MG tablet       Salicylates     aspirin (GOODSENSE ASPIRIN) 325 MG tablet             Last three blood pressure readings:  BP Readings from Last 3 Encounters:   04/09/21 138/88   02/11/21 138/88   12/09/20 132/72            Tobacco Use      Smoking status: Never Smoker      Smokeless tobacco: Never Used          Patient is due/failing the following:   Eye Exam    Type of outreach:    Sent letter.    Questions for provider review:    None                                                                                                                                     Ale Daniels LPN .............6/11/2021  12:48 PM       Chart routed to .

## 2021-06-11 NOTE — LETTER
June 11, 2021      Oh Delarosa  [address] [place] [address]      Your healthcare team cares about your health. To provide you with the best care,   we have reviewed your chart and based on our findings, we see that you are due to:     - DIABETES FOLLOW UP: Schedule a DIABETIC EYE EXAM.  This exam is done with an optometrist. You can schedule this appointment with your eye doctor.  If you need a referral, please let us know.    If you have already completed these items, please contact the clinic via phone or   [employer] so your care team can review and update your records. Thank you for   choosing [facility] for your healthcare needs. For any questions,   concerns, or to schedule an appointment please contact the clinic.       Healthy Regards,      Your [facility] Care Team

## 2021-08-02 ENCOUNTER — TELEPHONE (OUTPATIENT)
Dept: LAB | Facility: OTHER | Age: 66
End: 2021-08-02
Attending: INTERNAL MEDICINE
Payer: MEDICARE

## 2021-08-02 DIAGNOSIS — E11.8 CONTROLLED TYPE 2 DIABETES MELLITUS WITH COMPLICATION, WITH LONG-TERM CURRENT USE OF INSULIN (H): Primary | ICD-10-CM

## 2021-08-02 DIAGNOSIS — E78.2 MIXED HYPERLIPIDEMIA: ICD-10-CM

## 2021-08-02 DIAGNOSIS — Z79.4 CONTROLLED TYPE 2 DIABETES MELLITUS WITH COMPLICATION, WITH LONG-TERM CURRENT USE OF INSULIN (H): Primary | ICD-10-CM

## 2021-08-02 NOTE — PROGRESS NOTES
This pt will be in on Wed for his labs work. Orders in chart need to be signed or released. Thank you.

## 2021-08-04 ENCOUNTER — LAB (OUTPATIENT)
Dept: LAB | Facility: OTHER | Age: 66
End: 2021-08-04
Attending: INTERNAL MEDICINE
Payer: MEDICARE

## 2021-08-04 ENCOUNTER — OFFICE VISIT (OUTPATIENT)
Dept: INTERNAL MEDICINE | Facility: OTHER | Age: 66
End: 2021-08-04
Attending: INTERNAL MEDICINE
Payer: MEDICARE

## 2021-08-04 VITALS
RESPIRATION RATE: 20 BRPM | HEART RATE: 83 BPM | OXYGEN SATURATION: 98 % | TEMPERATURE: 97.6 F | SYSTOLIC BLOOD PRESSURE: 136 MMHG | BODY MASS INDEX: 28.39 KG/M2 | WEIGHT: 197.2 LBS | DIASTOLIC BLOOD PRESSURE: 88 MMHG

## 2021-08-04 DIAGNOSIS — E11.8 CONTROLLED TYPE 2 DIABETES MELLITUS WITH COMPLICATION, WITH LONG-TERM CURRENT USE OF INSULIN (H): ICD-10-CM

## 2021-08-04 DIAGNOSIS — Z90.89 S/P SUBTOTAL PARATHYROIDECTOMY: ICD-10-CM

## 2021-08-04 DIAGNOSIS — E11.8 CONTROLLED TYPE 2 DIABETES MELLITUS WITH COMPLICATION, WITH LONG-TERM CURRENT USE OF INSULIN (H): Primary | ICD-10-CM

## 2021-08-04 DIAGNOSIS — N18.2 CKD (CHRONIC KIDNEY DISEASE) STAGE 2, GFR 60-89 ML/MIN: ICD-10-CM

## 2021-08-04 DIAGNOSIS — Z98.890 S/P SUBTOTAL PARATHYROIDECTOMY: ICD-10-CM

## 2021-08-04 DIAGNOSIS — Z71.85 VACCINE COUNSELING: ICD-10-CM

## 2021-08-04 DIAGNOSIS — E78.2 MIXED HYPERLIPIDEMIA: ICD-10-CM

## 2021-08-04 DIAGNOSIS — Z79.4 CONTROLLED TYPE 2 DIABETES MELLITUS WITH COMPLICATION, WITH LONG-TERM CURRENT USE OF INSULIN (H): ICD-10-CM

## 2021-08-04 DIAGNOSIS — Z79.4 CONTROLLED TYPE 2 DIABETES MELLITUS WITH COMPLICATION, WITH LONG-TERM CURRENT USE OF INSULIN (H): Primary | ICD-10-CM

## 2021-08-04 DIAGNOSIS — I10 BENIGN ESSENTIAL HYPERTENSION: ICD-10-CM

## 2021-08-04 LAB
ALBUMIN SERPL-MCNC: 4.6 G/DL (ref 3.5–5.7)
ALBUMIN UR-MCNC: NEGATIVE MG/DL
ALP SERPL-CCNC: 76 U/L (ref 34–104)
ALT SERPL W P-5'-P-CCNC: 15 U/L (ref 7–52)
ANION GAP SERPL CALCULATED.3IONS-SCNC: 9 MMOL/L (ref 3–14)
APPEARANCE UR: CLEAR
AST SERPL W P-5'-P-CCNC: 16 U/L (ref 13–39)
BILIRUB SERPL-MCNC: 0.6 MG/DL (ref 0.3–1)
BILIRUB UR QL STRIP: NEGATIVE
BUN SERPL-MCNC: 19 MG/DL (ref 7–25)
CALCIUM SERPL-MCNC: 10.1 MG/DL (ref 8.6–10.3)
CHLORIDE BLD-SCNC: 104 MMOL/L (ref 98–107)
CHOLEST SERPL-MCNC: 130 MG/DL
CO2 SERPL-SCNC: 29 MMOL/L (ref 21–31)
COLOR UR AUTO: YELLOW
CREAT SERPL-MCNC: 1 MG/DL (ref 0.7–1.3)
CREAT UR-MCNC: 56 MG/DL
ERYTHROCYTE [DISTWIDTH] IN BLOOD BY AUTOMATED COUNT: 13.2 % (ref 10–15)
FASTING STATUS PATIENT QL REPORTED: NORMAL
GFR SERPL CREATININE-BSD FRML MDRD: 78 ML/MIN/1.73M2
GLUCOSE BLD-MCNC: 84 MG/DL (ref 70–105)
GLUCOSE UR STRIP-MCNC: NEGATIVE MG/DL
HBA1C MFR BLD: 7.4 % (ref 4–6.2)
HCT VFR BLD AUTO: 47.8 % (ref 40–53)
HDLC SERPL-MCNC: 37 MG/DL (ref 23–92)
HGB BLD-MCNC: 16.3 G/DL (ref 13.3–17.7)
HGB UR QL STRIP: NEGATIVE
KETONES UR STRIP-MCNC: NEGATIVE MG/DL
LDLC SERPL CALC-MCNC: 78 MG/DL
LEUKOCYTE ESTERASE UR QL STRIP: NEGATIVE
MCH RBC QN AUTO: 29.6 PG (ref 26.5–33)
MCHC RBC AUTO-ENTMCNC: 34.1 G/DL (ref 31.5–36.5)
MCV RBC AUTO: 87 FL (ref 78–100)
MICROALBUMIN UR-MCNC: 8 MG/L
MICROALBUMIN/CREAT UR: 14.29 MG/G CR (ref 0–17)
NITRATE UR QL: NEGATIVE
NONHDLC SERPL-MCNC: 93 MG/DL
PH UR STRIP: 7 [PH] (ref 5–9)
PLATELET # BLD AUTO: 253 10E3/UL (ref 150–450)
POTASSIUM BLD-SCNC: 4.5 MMOL/L (ref 3.5–5.1)
PROT SERPL-MCNC: 7.9 G/DL (ref 6.4–8.9)
RBC # BLD AUTO: 5.5 10E6/UL (ref 4.4–5.9)
SODIUM SERPL-SCNC: 142 MMOL/L (ref 134–144)
SP GR UR STRIP: 1.01 (ref 1–1.03)
T4 FREE SERPL-MCNC: 0.79 NG/DL (ref 0.6–1.6)
TRIGL SERPL-MCNC: 75 MG/DL
TSH SERPL DL<=0.005 MIU/L-ACNC: 5.67 MU/L (ref 0.4–4)
UROBILINOGEN UR STRIP-MCNC: NORMAL MG/DL
WBC # BLD AUTO: 8.8 10E3/UL (ref 4–11)

## 2021-08-04 PROCEDURE — 84443 ASSAY THYROID STIM HORMONE: CPT | Mod: ZL

## 2021-08-04 PROCEDURE — 80061 LIPID PANEL: CPT | Mod: ZL

## 2021-08-04 PROCEDURE — 99214 OFFICE O/P EST MOD 30 MIN: CPT | Performed by: INTERNAL MEDICINE

## 2021-08-04 PROCEDURE — 81003 URINALYSIS AUTO W/O SCOPE: CPT | Mod: ZL

## 2021-08-04 PROCEDURE — 82043 UR ALBUMIN QUANTITATIVE: CPT | Mod: ZL

## 2021-08-04 PROCEDURE — 80053 COMPREHEN METABOLIC PANEL: CPT | Mod: ZL

## 2021-08-04 PROCEDURE — 36415 COLL VENOUS BLD VENIPUNCTURE: CPT | Mod: ZL

## 2021-08-04 PROCEDURE — 85027 COMPLETE CBC AUTOMATED: CPT | Mod: ZL

## 2021-08-04 PROCEDURE — 83036 HEMOGLOBIN GLYCOSYLATED A1C: CPT | Mod: ZL

## 2021-08-04 PROCEDURE — G0463 HOSPITAL OUTPT CLINIC VISIT: HCPCS

## 2021-08-04 PROCEDURE — 84439 ASSAY OF FREE THYROXINE: CPT | Mod: ZL

## 2021-08-04 RX ORDER — LOSARTAN POTASSIUM 50 MG/1
50 TABLET ORAL DAILY
Qty: 90 TABLET | Refills: 3 | Status: SHIPPED | OUTPATIENT
Start: 2021-08-04 | End: 2022-07-20

## 2021-08-04 RX ORDER — LANCETS
EACH MISCELLANEOUS
COMMUNITY
Start: 2021-05-26

## 2021-08-04 ASSESSMENT — ENCOUNTER SYMPTOMS
FATIGUE: 0
WHEEZING: 0
AGITATION: 0
ABDOMINAL PAIN: 0
LIGHT-HEADEDNESS: 0
DIARRHEA: 0
DYSURIA: 0
DIZZINESS: 0
SHORTNESS OF BREATH: 0
FEVER: 0
ARTHRALGIAS: 0
CHILLS: 0
CONFUSION: 0
NUMBNESS: 1
EYE PAIN: 0
COUGH: 0
MYALGIAS: 0
NAUSEA: 0
VOMITING: 0
HEMATURIA: 0
PALPITATIONS: 0
BRUISES/BLEEDS EASILY: 0

## 2021-08-04 ASSESSMENT — PAIN SCALES - GENERAL: PAINLEVEL: SEVERE PAIN (7)

## 2021-08-04 NOTE — PATIENT INSTRUCTIONS
Immunization History   Administered Date(s) Administered     COVID-19,PF,Moderna 03/01/2021, 03/29/2021     Influenza (IIV3) PF 11/02/2006, 10/26/2007     Influenza Vaccine IM > 6 months Valent IIV4 11/06/2019     Influenza, Quad, High Dose, Pf, 65yr + 12/09/2020     TDAP Vaccine (Adacel) 11/25/2013      -- Consider Annual Flu / Influenza vaccination - Every Fall (Starting about October 1st)    -- Get your tetanus shot updated - from one of the local pharmacies, at your convenience. --- After 11/2023.     -- Get the new shingles shot (2 in series) (Shingrix) - from one of the local pharmacies, at your convenience. -- Check cost/coverage.   -- or -- If these are very expensive, go to the Local Public Health Department     Pneumococcal Pneumonia vaccines (PCV 23) -- Pneumococcal 23 - Valent Vaccine -- One dose after age 65. Okay to get anytime.       Blood pressure is well controlled.   Diabetes is well controlled.     Medications refilled.   Labs are stable.       Results for orders placed or performed in visit on 08/04/21   Comprehensive metabolic panel     Status: Normal   Result Value Ref Range    Sodium 142 134 - 144 mmol/L    Potassium 4.5 3.5 - 5.1 mmol/L    Chloride 104 98 - 107 mmol/L    Carbon Dioxide (CO2) 29 21 - 31 mmol/L    Anion Gap 9 3 - 14 mmol/L    Urea Nitrogen 19 7 - 25 mg/dL    Creatinine 1.00 0.70 - 1.30 mg/dL    Calcium 10.1 8.6 - 10.3 mg/dL    Glucose 84 70 - 105 mg/dL    Alkaline Phosphatase 76 34 - 104 U/L    AST 16 13 - 39 U/L    ALT 15 7 - 52 U/L    Protein Total 7.9 6.4 - 8.9 g/dL    Albumin 4.6 3.5 - 5.7 g/dL    Bilirubin Total 0.6 0.3 - 1.0 mg/dL    GFR Estimate 78 >60 mL/min/1.73m2   Lipid Profile     Status: None   Result Value Ref Range    Cholesterol 130 <200 mg/dL    Triglycerides 75 <150 mg/dL    Direct Measure HDL 37 23 - 92 mg/dL    LDL Cholesterol Calculated 78 <=100 mg/dL    Non HDL Cholesterol 93 <130 mg/dL    Patient Fasting > 8hrs? Unknown    TSH with free T4 reflex      Status: Abnormal   Result Value Ref Range    TSH 5.67 (H) 0.40 - 4.00 mU/L   Hemoglobin A1c     Status: Abnormal   Result Value Ref Range    Hemoglobin A1C 7.4 (H) 4.0 - 6.2 %   CBC with platelets     Status: Normal   Result Value Ref Range    WBC Count 8.8 4.0 - 11.0 10e3/uL    RBC Count 5.50 4.40 - 5.90 10e6/uL    Hemoglobin 16.3 13.3 - 17.7 g/dL    Hematocrit 47.8 40.0 - 53.0 %    MCV 87 78 - 100 fL    MCH 29.6 26.5 - 33.0 pg    MCHC 34.1 31.5 - 36.5 g/dL    RDW 13.2 10.0 - 15.0 %    Platelet Count 253 150 - 450 10e3/uL   UA reflex to Microscopic     Status: Normal   Result Value Ref Range    Color Urine Yellow Colorless, Straw, Light Yellow, Yellow    Appearance Urine Clear Clear    Glucose Urine Negative Negative mg/dL    Bilirubin Urine Negative Negative    Ketones Urine Negative Negative mg/dL    Specific Gravity Urine 1.011 1.000 - 1.030    Blood Urine Negative Negative    pH Urine 7.0 5.0 - 9.0    Protein Albumin Urine Negative Negative mg/dL    Urobilinogen Urine Normal Normal, 2.0 mg/dL    Nitrite Urine Negative Negative    Leukocyte Esterase Urine Negative Negative    Narrative    Microscopic not indicated      Aspects of Diabetes:   Recent Labs   Lab Test 08/04/21  0716 04/09/21  1029 12/09/20  0737   A1C 7.4* 7.6* 7.5*   LDL 78 72 70   HDL 37 41 45   TRIG 75 100 67   ALT 15 16 18   CR 1.00 0.98 1.00   GFRESTIMATED 78 77 75   GFRESTBLACK  --  >90 >90   POTASSIUM 4.5 4.5 3.9   TSH 5.67*  --   --       Hemoglobin A1c  Goal range is under 8%. Best is 6.5 to 7   Blood Pressure 136/88 Goal to keep less than 140/90   Tobacco  reports that he has never smoked. He has never used smokeless tobacco. Goal to abstain from tobacco   Aspirin or Plavix Anti-platelet therapy Aspirin or Plavix reduces risk of heart disease and stroke  -- sometimes used with other blood thinners, depending on bleeding risk and risk factors.    ACE/ARB Specific blood pressure meds These medications can reduce risk of kidney disease    Cholesterol Statins (Lipitor, Crestor, vs others) Statins reduce risk of heart disease and stroke   Eye Exam -- Do Yearly -- Annual diabetic eye exam   Healthy weight Wt Readings from Last 3 Encounters:   08/04/21 89.4 kg (197 lb 3.2 oz)   04/09/21 89.4 kg (197 lb 3.2 oz)   02/11/21 90.9 kg (200 lb 6.4 oz)     Body mass index is 28.39 kg/m .  Goal BMI under 30, best is under 25.      -- Trying to exercise daily (goal at least 20 min/day) with moderate aerobic activity   -- Eat healthy (resources from ADA at http://www.diabetes.org/)   -- Taking good care of my feet. Consider seeing the Podiatrist   -- Check blood sugars as directed, record in log book and bring to every appointment    Insurance companies are grading you and I on your blood sugar control -- Goal is to get your A1c down to 7.9% or lower and NO Smoking!  -- Medicare and most insurance companies, will only cover Hemoglobin A1c labs to be rechecked every 91+ days.      Return for Diabetes labs and clinic follow-up appointment every 3 to 4 months.    Schedule lab only appointment --- A few days AFTER: 11/2/21   Schedule clinic appointment with Dr. Wilson -- Same day as labs, or 1-2 days later.

## 2021-08-04 NOTE — PROGRESS NOTES
Mr. Delarosa is a 66 year old male who presents to the clinic for evaluation of the following problems:      ICD-10-CM    1. Controlled type 2 diabetes mellitus with complication, with long-term current use of insulin (H)  E11.8 metFORMIN (GLUCOPHAGE) 1000 MG tablet    Z79.4    2. Mixed hyperlipidemia  E78.2    3. Benign essential hypertension  I10 losartan (COZAAR) 50 MG tablet   4. S/P subtotal parathyroidectomy  Z98.890    5. CKD (chronic kidney disease) stage 2, GFR 60-89 ml/min  N18.2    6. Vaccine counseling  Z71.89      HPI  Patient presents for diabetes follow-up.  As well as follow-up multiple issues.    Type 2 diabetes, has been well controlled.  Denies hypoglycemia.  Doing well with Metformin.  Hemoglobin A1c is controlled.  Needs refills.    Mixed hyperlipidemia, currently controlled with Lipitor currently 40 mg daily.  Seems to be tolerating well without side effects.  No changes for now.    Hypertension, blood pressure is doing well.  Taking losartan.  Denies hypotension or syncope/presyncope.  Refill sent to pharmacy.    Status post subtotal parathyroidectomy, calcium levels are controlled.  No changes for now.    Stage II chronic kidney disease, kidney function has been slowly declining.  Encourage NSAID avoidance.  Adequate oral hydration.    Vaccine counseling completed today.  Recommend pneumococcal 23 vaccination, shingles vaccination and to plan on getting a influenza vaccination later this fall.    Functional Capacity: > 4 METS.   Review of Systems   Constitutional: Negative for chills, fatigue and fever.   HENT: Negative for congestion and hearing loss.    Eyes: Negative for pain and visual disturbance.   Respiratory: Negative for cough, shortness of breath and wheezing.    Cardiovascular: Negative for chest pain and palpitations.   Gastrointestinal: Negative for abdominal pain, diarrhea, nausea and vomiting.   Endocrine: Negative for cold intolerance and heat intolerance.   Genitourinary:  "Negative for dysuria and hematuria.   Musculoskeletal: Negative for arthralgias and myalgias.        Right 4th finger, in palm. starting to develop trigger finger vs contracture   Skin: Negative for pallor.   Allergic/Immunologic: Negative for immunocompromised state.   Neurological: Positive for numbness (+ bilateral feet, comes and goes -- better with better glucose control). Negative for dizziness and light-headedness.   Hematological: Does not bruise/bleed easily.   Psychiatric/Behavioral: Negative for agitation and confusion.      Reviewed and updated as needed this visit by Provider   Tobacco  Allergies  Meds  Problems  Med Hx  Surg Hx  Fam Hx          EXAM:   Vitals:    08/04/21 0806   BP: 136/88   Pulse: 83   Resp: 20   Temp: 97.6  F (36.4  C)   TempSrc: Tympanic   SpO2: 98%   Weight: 89.4 kg (197 lb 3.2 oz)       Current Pain Score: Severe Pain (7)     BP Readings from Last 3 Encounters:   08/04/21 136/88   04/09/21 138/88   02/11/21 138/88      Wt Readings from Last 3 Encounters:   08/04/21 89.4 kg (197 lb 3.2 oz)   04/09/21 89.4 kg (197 lb 3.2 oz)   02/11/21 90.9 kg (200 lb 6.4 oz)      Estimated body mass index is 28.39 kg/m  as calculated from the following:    Height as of 2/11/21: 1.775 m (5' 9.88\").    Weight as of this encounter: 89.4 kg (197 lb 3.2 oz).     Physical Exam  Constitutional:       General: He is not in acute distress.     Appearance: He is well-developed. He is not diaphoretic.   HENT:      Head: Normocephalic and atraumatic.   Eyes:      General: No scleral icterus.     Conjunctiva/sclera: Conjunctivae normal.   Neck:      Vascular: No carotid bruit.   Cardiovascular:      Rate and Rhythm: Normal rate and regular rhythm.      Pulses: Normal pulses.   Pulmonary:      Effort: Pulmonary effort is normal.      Breath sounds: Normal breath sounds.   Abdominal:      Palpations: Abdomen is soft.      Tenderness: There is no abdominal tenderness.   Musculoskeletal:         General: " Tenderness (right 4th finger, in palm. starting to develop trigger finger vs contracture  ) present. No deformity.      Cervical back: Neck supple.      Right lower leg: No edema.      Left lower leg: No edema.   Lymphadenopathy:      Cervical: No cervical adenopathy.   Skin:     General: Skin is warm and dry.      Findings: No rash.   Neurological:      Mental Status: He is alert and oriented to person, place, and time. Mental status is at baseline.   Psychiatric:         Mood and Affect: Mood normal.         Behavior: Behavior normal.        Procedures   INVESTIGATIONS:  - Labs reviewed in Epic     ASSESSMENT AND PLAN:  Problem List Items Addressed This Visit        Endocrine    Controlled type 2 diabetes mellitus with complication, with long-term current use of insulin (H) - Primary    Relevant Medications    metFORMIN (GLUCOPHAGE) 1000 MG tablet    Mixed hyperlipidemia       Circulatory    Benign essential hypertension    Relevant Medications    losartan (COZAAR) 50 MG tablet       Urinary    CKD (chronic kidney disease) stage 2, GFR 60-89 ml/min       Other    S/P subtotal parathyroidectomy      Other Visit Diagnoses     Vaccine counseling            reviewed diet, exercise and weight control, recommended sodium restriction, cardiovascular risk and specific lipid/LDL goals reviewed, specific diabetic recommendations low cholesterol diet, weight control and daily exercise discussed, use of aspirin to prevent MI and TIA's discussed  -- Expected clinical course discussed    -- Medications and their side effects discussed    The 10-year ASCVD risk score ([place] FISHER Jr., et al., 2013) is: 28%    Values used to calculate the score:      Age: 66 years      Sex: Male      Is Non- : No      Diabetic: Yes      Tobacco smoker: No      Systolic Blood Pressure: 136 mmHg      Is BP treated: Yes      HDL Cholesterol: 37 mg/dL      Total Cholesterol: 130 mg/dL    Patient Instructions     Immunization  History   Administered Date(s) Administered     COVID-19,PF,Moderna 03/01/2021, 03/29/2021     Influenza (IIV3) PF 11/02/2006, 10/26/2007     Influenza Vaccine IM > 6 months Valent IIV4 11/06/2019     Influenza, Quad, High Dose, Pf, 65yr + 12/09/2020     TDAP Vaccine (Adacel) 11/25/2013      -- Consider Annual Flu / Influenza vaccination - Every Fall (Starting about October 1st)    -- Get your tetanus shot updated - from one of the local pharmacies, at your convenience. --- After 11/2023.     -- Get the new shingles shot (2 in series) (Shingrix) - from one of the local pharmacies, at your convenience. -- Check cost/coverage.   -- or -- If these are very expensive, go to the Local Public Health Department     Pneumococcal Pneumonia vaccines (PCV 23) -- Pneumococcal 23 - Valent Vaccine -- One dose after age 65. Okay to get anytime.       Blood pressure is well controlled.   Diabetes is well controlled.     Medications refilled.   Labs are stable.       Results for orders placed or performed in visit on 08/04/21   Comprehensive metabolic panel     Status: Normal   Result Value Ref Range    Sodium 142 134 - 144 mmol/L    Potassium 4.5 3.5 - 5.1 mmol/L    Chloride 104 98 - 107 mmol/L    Carbon Dioxide (CO2) 29 21 - 31 mmol/L    Anion Gap 9 3 - 14 mmol/L    Urea Nitrogen 19 7 - 25 mg/dL    Creatinine 1.00 0.70 - 1.30 mg/dL    Calcium 10.1 8.6 - 10.3 mg/dL    Glucose 84 70 - 105 mg/dL    Alkaline Phosphatase 76 34 - 104 U/L    AST 16 13 - 39 U/L    ALT 15 7 - 52 U/L    Protein Total 7.9 6.4 - 8.9 g/dL    Albumin 4.6 3.5 - 5.7 g/dL    Bilirubin Total 0.6 0.3 - 1.0 mg/dL    GFR Estimate 78 >60 mL/min/1.73m2   Lipid Profile     Status: None   Result Value Ref Range    Cholesterol 130 <200 mg/dL    Triglycerides 75 <150 mg/dL    Direct Measure HDL 37 23 - 92 mg/dL    LDL Cholesterol Calculated 78 <=100 mg/dL    Non HDL Cholesterol 93 <130 mg/dL    Patient Fasting > 8hrs? Unknown    TSH with free T4 reflex     Status: Abnormal    Result Value Ref Range    TSH 5.67 (H) 0.40 - 4.00 mU/L   Hemoglobin A1c     Status: Abnormal   Result Value Ref Range    Hemoglobin A1C 7.4 (H) 4.0 - 6.2 %   CBC with platelets     Status: Normal   Result Value Ref Range    WBC Count 8.8 4.0 - 11.0 10e3/uL    RBC Count 5.50 4.40 - 5.90 10e6/uL    Hemoglobin 16.3 13.3 - 17.7 g/dL    Hematocrit 47.8 40.0 - 53.0 %    MCV 87 78 - 100 fL    MCH 29.6 26.5 - 33.0 pg    MCHC 34.1 31.5 - 36.5 g/dL    RDW 13.2 10.0 - 15.0 %    Platelet Count 253 150 - 450 10e3/uL   UA reflex to Microscopic     Status: Normal   Result Value Ref Range    Color Urine Yellow Colorless, Straw, Light Yellow, Yellow    Appearance Urine Clear Clear    Glucose Urine Negative Negative mg/dL    Bilirubin Urine Negative Negative    Ketones Urine Negative Negative mg/dL    Specific Gravity Urine 1.011 1.000 - 1.030    Blood Urine Negative Negative    pH Urine 7.0 5.0 - 9.0    Protein Albumin Urine Negative Negative mg/dL    Urobilinogen Urine Normal Normal, 2.0 mg/dL    Nitrite Urine Negative Negative    Leukocyte Esterase Urine Negative Negative    Narrative    Microscopic not indicated      Aspects of Diabetes:   Recent Labs   Lab Test 08/04/21  0716 04/09/21  1029 12/09/20  0737   A1C 7.4* 7.6* 7.5*   LDL 78 72 70   HDL 37 41 45   TRIG 75 100 67   ALT 15 16 18   CR 1.00 0.98 1.00   GFRESTIMATED 78 77 75   GFRESTBLACK  --  >90 >90   POTASSIUM 4.5 4.5 3.9   TSH 5.67*  --   --       Hemoglobin A1c  Goal range is under 8%. Best is 6.5 to 7   Blood Pressure 136/88 Goal to keep less than 140/90   Tobacco  reports that he has never smoked. He has never used smokeless tobacco. Goal to abstain from tobacco   Aspirin or Plavix Anti-platelet therapy Aspirin or Plavix reduces risk of heart disease and stroke  -- sometimes used with other blood thinners, depending on bleeding risk and risk factors.    ACE/ARB Specific blood pressure meds These medications can reduce risk of kidney disease   Cholesterol Statins  (Lipitor, Crestor, vs others) Statins reduce risk of heart disease and stroke   Eye Exam -- Do Yearly -- Annual diabetic eye exam   Healthy weight Wt Readings from Last 3 Encounters:   08/04/21 89.4 kg (197 lb 3.2 oz)   04/09/21 89.4 kg (197 lb 3.2 oz)   02/11/21 90.9 kg (200 lb 6.4 oz)     Body mass index is 28.39 kg/m .  Goal BMI under 30, best is under 25.      -- Trying to exercise daily (goal at least 20 min/day) with moderate aerobic activity   -- Eat healthy (resources from ADA at http://www.diabetes.org/)   -- Taking good care of my feet. Consider seeing the Podiatrist   -- Check blood sugars as directed, record in log book and bring to every appointment    Insurance companies are grading you and I on your blood sugar control -- Goal is to get your A1c down to 7.9% or lower and NO Smoking!  -- Medicare and most insurance companies, will only cover Hemoglobin A1c labs to be rechecked every 91+ days.      Return for Diabetes labs and clinic follow-up appointment every 3 to 4 months.    Schedule lab only appointment --- A few days AFTER: 11/2/21   Schedule clinic appointment with Dr. Wilson -- Same day as labs, or 1-2 days later.      Electronically signed by:  Moshe Wilson MD on 8/4/2021  Internal Medicine  [facility]

## 2021-08-04 NOTE — NURSING NOTE
"Chief Complaint   Patient presents with     Diabetes       Initial /88   Pulse 83   Temp 97.6  F (36.4  C) (Tympanic)   Resp 20   Wt 89.4 kg (197 lb 3.2 oz)   SpO2 98%   BMI 28.39 kg/m   Estimated body mass index is 28.39 kg/m  as calculated from the following:    Height as of 2/11/21: 1.775 m (5' 9.88\").    Weight as of this encounter: 89.4 kg (197 lb 3.2 oz).  FOOD SECURITY SCREENING QUESTIONS  Hunger Vital Signs:  Within the past 12 months we worried whether our food would run out before we got money to buy more. Never  Within the past 12 months the food we bought just didn't last and we didn't have money to get more. Never        Toni Funes, SHAKIRA    "

## 2021-10-11 DIAGNOSIS — Z79.4 CONTROLLED TYPE 2 DIABETES MELLITUS WITH COMPLICATION, WITH LONG-TERM CURRENT USE OF INSULIN (H): ICD-10-CM

## 2021-10-11 DIAGNOSIS — E11.8 CONTROLLED TYPE 2 DIABETES MELLITUS WITH COMPLICATION, WITH LONG-TERM CURRENT USE OF INSULIN (H): ICD-10-CM

## 2021-10-11 RX ORDER — FLURBIPROFEN SODIUM 0.3 MG/ML
SOLUTION/ DROPS OPHTHALMIC
Qty: 200 EACH | Refills: 3 | Status: SHIPPED | OUTPATIENT
Start: 2021-10-11 | End: 2022-11-01

## 2022-01-25 DIAGNOSIS — Z79.4 CONTROLLED TYPE 2 DIABETES MELLITUS WITH COMPLICATION, WITH LONG-TERM CURRENT USE OF INSULIN (H): ICD-10-CM

## 2022-01-25 DIAGNOSIS — E11.8 CONTROLLED TYPE 2 DIABETES MELLITUS WITH COMPLICATION, WITH LONG-TERM CURRENT USE OF INSULIN (H): ICD-10-CM

## 2022-01-25 NOTE — LETTER
January 26, 2022      Oh Delarosa  [address]  [place] [address]        Dear Oh,     A refill request was received from your pharmacy for Insulin.    Additional refills require an office visit with Dr. Wilson for diabetic follow up and labs.    Please call [phone] to schedule an appointment.      Sincerely,      Refill Nurse

## 2022-01-26 RX ORDER — INSULIN GLARGINE 100 [IU]/ML
INJECTION, SOLUTION SUBCUTANEOUS
Qty: 15 ML | Refills: 0 | Status: SHIPPED | OUTPATIENT
Start: 2022-01-26 | End: 2022-03-18

## 2022-01-26 NOTE — TELEPHONE ENCOUNTER
Prescription approved per [facility] Refill Protocol.  LOV and labs: 8/4/2021  Patient due for diabetic follow up and labs  Letter sent    Jessica Guillermo RN on 1/26/2022 at 1:22 PM

## 2022-03-02 DIAGNOSIS — E55.9 VITAMIN D DEFICIENCY: ICD-10-CM

## 2022-03-02 RX ORDER — ACETAMINOPHEN 160 MG
TABLET,DISINTEGRATING ORAL
Qty: 180 CAPSULE | Refills: 2 | Status: SHIPPED | OUTPATIENT
Start: 2022-03-02 | End: 2023-02-10

## 2022-03-02 NOTE — TELEPHONE ENCOUNTER
[id number] sent Rx request for the following:      Requested Prescriptions   Pending Prescriptions Disp Refills     Cholecalciferol (VITAMIN D3) 50 MCG (2000 UT) CAPS [Pharmacy Med Name: VITAMIN D3 50MCG SOFTGEL] 180 capsule 3     Sig: TAKE 2 CAPSULES BY MOUTH DAILY FOR VITAMIN D DEFICIENCY       Vitamin Supplements (Adult) Protocol Passed - 3/2/2022  7:32 AM      Last Prescription Date:   2/11/2021  Last Fill Qty/Refills:         180, R-3    Last Office Visit:              8/4/2021 Steve   Future Office visit:           3/18/2022 Steve  Routing refill request to provider for review/approval Grace Vann RN ....................  3/2/2022   8:33 AM

## 2022-03-17 NOTE — PROGRESS NOTES
Assessment & Plan       ICD-10-CM    1. Controlled type 2 diabetes mellitus with complication, with long-term current use of insulin (H)  E11.8 glipiZIDE (GLUCOTROL) 10 MG tablet    Z79.4 insulin glargine (LANTUS SOLOSTAR) 100 UNIT/ML pen   2. Encounter for Medicare annual wellness exam  Z00.00    3. Mixed hyperlipidemia  E78.2 atorvastatin (LIPITOR) 40 MG tablet   4. S/P subtotal parathyroidectomy (H)  E89.2    5. Benign essential hypertension  I10    6. CKD (chronic kidney disease) stage 2, GFR 60-89 ml/min  N18.2    7. Hereditary and idiopathic peripheral neuropathy  G60.9    8. Vitamin D deficiency  E55.9    9. Vaccine counseling  Z71.85    Patient presents for Medicare annual wellness visit as well as follow-up multiple issues.    Type 2 diabetes, has been well controlled.  Blood sugars unfortunately have gone up.  Indicates that he rarely takes glipizide with lunch.  Generally just takes 1 tablet with breakfast and supper.  Encouraged him to start taking glipizide 10 mg 3 times daily, 1 tablet with each meal.  Med list updated.  Refill sent to pharmacy.  Needs refills of insulin as well.    Mixed hyperlipidemia.  Currently taking Lipitor 40 mg daily.  Tolerating well without side effects.  Needs refills.  Cholesterol panel is all at goal.  No changes for now.  Recent Labs   Lab Test 03/18/22  0658 08/04/21  0716   CHOL 125 130   HDL 43 37   LDL 70 78   TRIG 58 75     History of hyper parathyroidism.  Status post subtotal parathyroidectomy.  Doing well.  Calcium is within normal range.    Hypertension.  Blood pressures at home have been in the 110-120's over 70s.  Indicates that his blood pressure in the clinic is always high.  Initial blood pressure today was 154/82.  Advised to continue monitoring at home.  If blood pressures become elevated will need to make some medication changes.  Otherwise appears to be doing well with current medications.  Continue to monitor.    Stage II chronic kidney disease.  Kidney  "function has been slowly declining.  Encourage NSAID avoidance.    Peripheral neuropathy.  Mixed cause.  Currently stable.  No changes.  Continue to monitor.    Vitamin D deficiency.  Chronic.  Ongoing.  Encourage continued oral replacement.    Vaccine counseling completed.  Encouraged consideration of shingles vaccination, pneumococcal vaccination.    Encouraged regular exercise.     Return in about 3 months (around 6/18/2022) for - Labs every 91+ days, DM Follow-up 1-2 days later, with Dr. Wilson.    Moshe Wilson MD  [facility]     Subjective   Vicente is a 67 year old who presents for the following health issues     Healthy Habits:     In general, how would you rate your overall health?  Good    Frequency of exercise:  6-7 days/week    Duration of exercise:  45-60 minutes    Do you usually eat at least 4 servings of fruit and vegetables a day, include whole grains    & fiber and avoid regularly eating high fat or \"junk\" foods?  Yes    Taking medications regularly:  Yes    Medication side effects:  None    Ability to successfully perform activities of daily living:  No assistance needed    Home Safety:  No safety concerns identified    Hearing Impairment:  Difficulty following a conversation in a noisy restaurant or crowded room and find that men's voices are easier to understand than woman's    In the past 6 months, have you been bothered by leaking of urine?  No    In general, how would you rate your overall mental or emotional health?  Fair      PHQ-2 Total Score: 0    Additional concerns today:  No       Annual Wellness Visit    Patient has been advised of split billing requirements and indicates understanding: Yes     Are you in the first 12 months of your Medicare Part B coverage?  No    Physical Health:  Answers for HPI/ROS submitted by the patient on 3/18/2022  If you checked off any problems, how difficult have these problems made it for you to do your work, take care of things at " home, or get along with other people?: Not difficult at all  PHQ9 TOTAL SCORE: 4  SHIRLENE 7 TOTAL SCORE: 0    Do you feel safe in your environment? Yes    Have you ever done Advance Care Planning? (For example, a Health Directive, POLST, or a discussion with a medical provider or your loved ones about your wishes)? Yes, patient states has an Advance Care Planning document and will bring a copy to the clinic.    Fall risk:  Fallen 2 or more times in the past year?: (P) No  Any fall with injury in the past year?: (P) No    Cognitive Screenin) Repeat 3 items (Leader, Season, Table)    2) Clock draw: NORMAL  3) 3 item recall: Recalls 3 objects  Results: 3 items recalled: COGNITIVE IMPAIRMENT LESS LIKELY    Mini-CogTM Copyright S Antonia. Licensed by the author for use in [employer]; reprinted with permission (elza@[facility]). All rights reserved.      Do you have sleep apnea, excessive snoring or daytime drowsiness?: no    Current providers sharing in care for this patient include:   Patient Care Team:  Moshe Wilson MD as PCP - General (Internal Medicine)  Moshe Wilson MD as Assigned PCP    Patient has been advised of split billing requirements and indicates understanding: Yes      Review of Systems   Constitutional: Negative for chills, fatigue and fever.   HENT: Negative for congestion and hearing loss.    Eyes: Negative for pain and visual disturbance.   Respiratory: Negative for cough, shortness of breath and wheezing.    Cardiovascular: Negative for chest pain and palpitations.   Gastrointestinal: Negative for abdominal pain, diarrhea, nausea and vomiting.   Endocrine: Negative for cold intolerance and heat intolerance.   Genitourinary: Negative for dysuria and hematuria.   Musculoskeletal: Negative for arthralgias and myalgias.        Right 4th finger, in palm. starting to develop trigger finger, improved with regular home therapy   Skin: Negative for pallor.   Allergic/Immunologic: Negative for  "immunocompromised state.   Neurological: Positive for numbness (+ bilateral feet, comes and goes -- better with better glucose control). Negative for dizziness and light-headedness.   Hematological: Does not bruise/bleed easily.   Psychiatric/Behavioral: Negative for agitation and confusion.          Objective    /70   Pulse 104   Temp 97.6  F (36.4  C) (Tympanic)   Resp 16   Ht 1.76 m (5' 9.29\")   Wt 89.4 kg (197 lb)   SpO2 97%   BMI 28.85 kg/m    Body mass index is 28.85 kg/m .  Physical Exam  Constitutional:       General: He is not in acute distress.     Appearance: He is well-developed. He is not diaphoretic.   HENT:      Head: Normocephalic and atraumatic.   Eyes:      General: No scleral icterus.     Conjunctiva/sclera: Conjunctivae normal.   Neck:      Vascular: No carotid bruit.   Cardiovascular:      Rate and Rhythm: Normal rate and regular rhythm.      Pulses: Normal pulses.   Pulmonary:      Effort: Pulmonary effort is normal.      Breath sounds: Normal breath sounds.   Abdominal:      Palpations: Abdomen is soft.      Tenderness: There is no abdominal tenderness.   Musculoskeletal:         General: No tenderness or deformity.      Cervical back: Neck supple.      Right lower leg: No edema.      Left lower leg: No edema.      Comments: right 4th finger, in palm. starting to develop trigger finger with mild contracture   Lymphadenopathy:      Cervical: No cervical adenopathy.   Skin:     General: Skin is warm and dry.      Findings: No rash.   Neurological:      Mental Status: He is alert and oriented to person, place, and time. Mental status is at baseline.   Psychiatric:         Mood and Affect: Mood normal.         Behavior: Behavior normal.       Recent Labs   Lab Test 03/18/22  0658 08/04/21  0716 04/09/21  1029 12/09/20  0737   A1C 7.9* 7.4* 7.6* 7.5*   LDL 70 78 72 70   HDL 43 37 41 45   TRIG 58 75 100 67   ALT 16 15 16 18   CR 1.01 1.00 0.98 1.00   GFRESTIMATED 82 78 77 75 "   GFRESTBLACK  --   --  >90 >90   POTASSIUM 4.4 4.5 4.5 3.9   TSH 5.40* 5.67*  --   --    Hemoglobin A1c is controlled but levels have worsened.  LDL HDL and triglycerides are all at goal.  ALT is normal.  Creatinine has worsened slightly.  Potassium is normal.  TSH is slightly elevated.

## 2022-03-18 ENCOUNTER — LAB (OUTPATIENT)
Dept: LAB | Facility: OTHER | Age: 67
End: 2022-03-18
Attending: INTERNAL MEDICINE
Payer: COMMERCIAL

## 2022-03-18 ENCOUNTER — OFFICE VISIT (OUTPATIENT)
Dept: INTERNAL MEDICINE | Facility: OTHER | Age: 67
End: 2022-03-18
Attending: INTERNAL MEDICINE
Payer: MEDICARE

## 2022-03-18 VITALS
DIASTOLIC BLOOD PRESSURE: 70 MMHG | RESPIRATION RATE: 16 BRPM | HEART RATE: 104 BPM | BODY MASS INDEX: 29.18 KG/M2 | HEIGHT: 69 IN | OXYGEN SATURATION: 97 % | TEMPERATURE: 97.6 F | WEIGHT: 197 LBS | SYSTOLIC BLOOD PRESSURE: 118 MMHG

## 2022-03-18 DIAGNOSIS — E11.8 CONTROLLED TYPE 2 DIABETES MELLITUS WITH COMPLICATION, WITH LONG-TERM CURRENT USE OF INSULIN (H): ICD-10-CM

## 2022-03-18 DIAGNOSIS — I10 BENIGN ESSENTIAL HYPERTENSION: ICD-10-CM

## 2022-03-18 DIAGNOSIS — G60.9 HEREDITARY AND IDIOPATHIC PERIPHERAL NEUROPATHY: ICD-10-CM

## 2022-03-18 DIAGNOSIS — E78.2 MIXED HYPERLIPIDEMIA: ICD-10-CM

## 2022-03-18 DIAGNOSIS — Z00.00 ENCOUNTER FOR MEDICARE ANNUAL WELLNESS EXAM: ICD-10-CM

## 2022-03-18 DIAGNOSIS — E11.8 CONTROLLED TYPE 2 DIABETES MELLITUS WITH COMPLICATION, WITH LONG-TERM CURRENT USE OF INSULIN (H): Primary | ICD-10-CM

## 2022-03-18 DIAGNOSIS — Z79.4 CONTROLLED TYPE 2 DIABETES MELLITUS WITH COMPLICATION, WITH LONG-TERM CURRENT USE OF INSULIN (H): Primary | ICD-10-CM

## 2022-03-18 DIAGNOSIS — Z71.85 VACCINE COUNSELING: ICD-10-CM

## 2022-03-18 DIAGNOSIS — Z90.89 S/P SUBTOTAL PARATHYROIDECTOMY: ICD-10-CM

## 2022-03-18 DIAGNOSIS — Z98.890 S/P SUBTOTAL PARATHYROIDECTOMY: ICD-10-CM

## 2022-03-18 DIAGNOSIS — E55.9 VITAMIN D DEFICIENCY: ICD-10-CM

## 2022-03-18 DIAGNOSIS — Z79.4 CONTROLLED TYPE 2 DIABETES MELLITUS WITH COMPLICATION, WITH LONG-TERM CURRENT USE OF INSULIN (H): ICD-10-CM

## 2022-03-18 DIAGNOSIS — N18.2 CKD (CHRONIC KIDNEY DISEASE) STAGE 2, GFR 60-89 ML/MIN: ICD-10-CM

## 2022-03-18 LAB
ALBUMIN SERPL-MCNC: 4.4 G/DL (ref 3.5–5.7)
ALBUMIN UR-MCNC: NEGATIVE MG/DL
ALP SERPL-CCNC: 83 U/L (ref 34–104)
ALT SERPL W P-5'-P-CCNC: 16 U/L (ref 7–52)
ANION GAP SERPL CALCULATED.3IONS-SCNC: 5 MMOL/L (ref 3–14)
APPEARANCE UR: CLEAR
AST SERPL W P-5'-P-CCNC: 16 U/L (ref 13–39)
BILIRUB SERPL-MCNC: 0.7 MG/DL (ref 0.3–1)
BILIRUB UR QL STRIP: NEGATIVE
BUN SERPL-MCNC: 13 MG/DL (ref 7–25)
CALCIUM SERPL-MCNC: 9.9 MG/DL (ref 8.6–10.3)
CHLORIDE BLD-SCNC: 103 MMOL/L (ref 98–107)
CHOLEST SERPL-MCNC: 125 MG/DL
CO2 SERPL-SCNC: 30 MMOL/L (ref 21–31)
COLOR UR AUTO: YELLOW
CREAT SERPL-MCNC: 1.01 MG/DL (ref 0.7–1.3)
CREAT UR-MCNC: 33 MG/DL
ERYTHROCYTE [DISTWIDTH] IN BLOOD BY AUTOMATED COUNT: 13.1 % (ref 10–15)
FASTING STATUS PATIENT QL REPORTED: NORMAL
GFR SERPL CREATININE-BSD FRML MDRD: 82 ML/MIN/1.73M2
GLUCOSE BLD-MCNC: 164 MG/DL (ref 70–105)
GLUCOSE UR STRIP-MCNC: NEGATIVE MG/DL
HBA1C MFR BLD: 7.9 % (ref 4–6.2)
HCT VFR BLD AUTO: 45.6 % (ref 40–53)
HDLC SERPL-MCNC: 43 MG/DL (ref 23–92)
HGB BLD-MCNC: 15.5 G/DL (ref 13.3–17.7)
HGB UR QL STRIP: NEGATIVE
KETONES UR STRIP-MCNC: NEGATIVE MG/DL
LDLC SERPL CALC-MCNC: 70 MG/DL
LEUKOCYTE ESTERASE UR QL STRIP: NEGATIVE
MCH RBC QN AUTO: 29.9 PG (ref 26.5–33)
MCHC RBC AUTO-ENTMCNC: 34 G/DL (ref 31.5–36.5)
MCV RBC AUTO: 88 FL (ref 78–100)
MICROALBUMIN UR-MCNC: 12 MG/L
MICROALBUMIN/CREAT UR: 36.36 MG/G CR (ref 0–17)
NITRATE UR QL: NEGATIVE
NONHDLC SERPL-MCNC: 82 MG/DL
PH UR STRIP: 7.5 [PH] (ref 5–9)
PLATELET # BLD AUTO: 223 10E3/UL (ref 150–450)
POTASSIUM BLD-SCNC: 4.4 MMOL/L (ref 3.5–5.1)
PROT SERPL-MCNC: 7.6 G/DL (ref 6.4–8.9)
RBC # BLD AUTO: 5.19 10E6/UL (ref 4.4–5.9)
SODIUM SERPL-SCNC: 138 MMOL/L (ref 134–144)
SP GR UR STRIP: 1.01 (ref 1–1.03)
T4 FREE SERPL-MCNC: 1.01 NG/DL (ref 0.6–1.6)
TRIGL SERPL-MCNC: 58 MG/DL
TSH SERPL DL<=0.005 MIU/L-ACNC: 5.4 MU/L (ref 0.4–4)
UROBILINOGEN UR STRIP-MCNC: NORMAL MG/DL
WBC # BLD AUTO: 6.6 10E3/UL (ref 4–11)

## 2022-03-18 PROCEDURE — G0439 PPPS, SUBSEQ VISIT: HCPCS | Performed by: INTERNAL MEDICINE

## 2022-03-18 PROCEDURE — G0463 HOSPITAL OUTPT CLINIC VISIT: HCPCS

## 2022-03-18 PROCEDURE — 84443 ASSAY THYROID STIM HORMONE: CPT | Mod: ZL

## 2022-03-18 PROCEDURE — 82043 UR ALBUMIN QUANTITATIVE: CPT | Mod: ZL

## 2022-03-18 PROCEDURE — 84439 ASSAY OF FREE THYROXINE: CPT | Mod: ZL

## 2022-03-18 PROCEDURE — 80053 COMPREHEN METABOLIC PANEL: CPT | Mod: ZL

## 2022-03-18 PROCEDURE — 85027 COMPLETE CBC AUTOMATED: CPT | Mod: ZL

## 2022-03-18 PROCEDURE — 81003 URINALYSIS AUTO W/O SCOPE: CPT | Mod: ZL

## 2022-03-18 PROCEDURE — 80061 LIPID PANEL: CPT | Mod: ZL

## 2022-03-18 PROCEDURE — 83036 HEMOGLOBIN GLYCOSYLATED A1C: CPT | Mod: ZL

## 2022-03-18 PROCEDURE — 36415 COLL VENOUS BLD VENIPUNCTURE: CPT | Mod: ZL

## 2022-03-18 PROCEDURE — 99214 OFFICE O/P EST MOD 30 MIN: CPT | Mod: 25 | Performed by: INTERNAL MEDICINE

## 2022-03-18 RX ORDER — ATORVASTATIN CALCIUM 40 MG/1
40 TABLET, FILM COATED ORAL DAILY
Qty: 90 TABLET | Refills: 4 | Status: SHIPPED | OUTPATIENT
Start: 2022-03-18 | End: 2023-02-10

## 2022-03-18 RX ORDER — BLOOD-GLUCOSE METER
EACH MISCELLANEOUS
COMMUNITY
Start: 2021-05-26

## 2022-03-18 RX ORDER — GLIPIZIDE 10 MG/1
10 TABLET ORAL
Qty: 270 TABLET | Refills: 4 | Status: SHIPPED | OUTPATIENT
Start: 2022-03-18 | End: 2022-11-01

## 2022-03-18 RX ORDER — INSULIN GLARGINE 100 [IU]/ML
INJECTION, SOLUTION SUBCUTANEOUS
Qty: 15 ML | Refills: 1 | Status: SHIPPED | OUTPATIENT
Start: 2022-03-18 | End: 2022-07-20

## 2022-03-18 ASSESSMENT — ENCOUNTER SYMPTOMS
VOMITING: 0
NAUSEA: 0
AGITATION: 0
CHILLS: 0
CONFUSION: 0
ABDOMINAL PAIN: 0
LIGHT-HEADEDNESS: 0
SHORTNESS OF BREATH: 0
DIARRHEA: 0
FEVER: 0
WHEEZING: 0
HEMATURIA: 0
ARTHRALGIAS: 0
DIZZINESS: 0
BRUISES/BLEEDS EASILY: 0
PALPITATIONS: 0
DYSURIA: 0
COUGH: 0
MYALGIAS: 0
FATIGUE: 0
EYE PAIN: 0
NUMBNESS: 1

## 2022-03-18 ASSESSMENT — ANXIETY QUESTIONNAIRES
7. FEELING AFRAID AS IF SOMETHING AWFUL MIGHT HAPPEN: NOT AT ALL
6. BECOMING EASILY ANNOYED OR IRRITABLE: NOT AT ALL
1. FEELING NERVOUS, ANXIOUS, OR ON EDGE: NOT AT ALL
5. BEING SO RESTLESS THAT IT IS HARD TO SIT STILL: NOT AT ALL
GAD7 TOTAL SCORE: 0
2. NOT BEING ABLE TO STOP OR CONTROL WORRYING: NOT AT ALL
4. TROUBLE RELAXING: NOT AT ALL
7. FEELING AFRAID AS IF SOMETHING AWFUL MIGHT HAPPEN: NOT AT ALL
GAD7 TOTAL SCORE: 0
3. WORRYING TOO MUCH ABOUT DIFFERENT THINGS: NOT AT ALL
GAD7 TOTAL SCORE: 0

## 2022-03-18 ASSESSMENT — ACTIVITIES OF DAILY LIVING (ADL): CURRENT_FUNCTION: NO ASSISTANCE NEEDED

## 2022-03-18 ASSESSMENT — PATIENT HEALTH QUESTIONNAIRE - PHQ9
SUM OF ALL RESPONSES TO PHQ QUESTIONS 1-9: 4
10. IF YOU CHECKED OFF ANY PROBLEMS, HOW DIFFICULT HAVE THESE PROBLEMS MADE IT FOR YOU TO DO YOUR WORK, TAKE CARE OF THINGS AT HOME, OR GET ALONG WITH OTHER PEOPLE: NOT DIFFICULT AT ALL
SUM OF ALL RESPONSES TO PHQ QUESTIONS 1-9: 4

## 2022-03-18 ASSESSMENT — PAIN SCALES - GENERAL: PAINLEVEL: SEVERE PAIN (7)

## 2022-03-18 NOTE — NURSING NOTE
"Chief Complaint   Patient presents with     Medicare Visit       FOOD SECURITY SCREENING QUESTIONS  Hunger Vital Signs:  Within the past 12 months we worried whether our food would run out before we got money to buy more. Never  Within the past 12 months the food we bought just didn't last and we didn't have money to get more. Never  Beatris Nava LPN 3/18/2022 8:02 AM      Initial BP (!) 154/82 (BP Location: Right arm, Patient Position: Sitting, Cuff Size: Adult Regular)   Pulse 104   Temp 97.6  F (36.4  C) (Tympanic)   Resp 16   Ht 1.76 m (5' 9.29\")   Wt 89.4 kg (197 lb)   SpO2 97%   BMI 28.85 kg/m   Estimated body mass index is 28.85 kg/m  as calculated from the following:    Height as of this encounter: 1.76 m (5' 9.29\").    Weight as of this encounter: 89.4 kg (197 lb).  Medication Reconciliation: complete    Beatris Nava LPN  "

## 2022-03-18 NOTE — PATIENT INSTRUCTIONS
Patient Education   Personalized Prevention Plan  You are due for the preventive services outlined below.  Your care team is available to assist you in scheduling these services.  If you have already completed any of these items, please share that information with your care team to update in your medical record.  Health Maintenance Due   Topic Date Due     Zoster (Shingles) Vaccine (1 of 2) Never done     Pneumococcal Vaccine (1 of 1 - PPSV23) Never done     Diabetic Foot Exam  02/11/2022     Eye Exam  02/11/2022     Preventive Health Recommendations  See your health care provider every year to    Review health changes.     Discuss preventive care.      Review your medicines if your doctor has prescribed any.    Talk with your health care provider about whether you should have a test to screen for prostate cancer (PSA).    Every 3 years, have a diabetes test (fasting glucose). If you are at risk for diabetes, you should have this test more often.    Every 5 years, have a cholesterol test. Have this test more often if you are at risk for high cholesterol or heart disease.     Every 10 years, have a colonoscopy. Or, have a yearly FIT test (stool test). These exams will check for colon cancer.    Talk to with your health care provider about screening for Abdominal Aortic Aneurysm if you have a family history of AAA or have a history of smoking.    Shots:     Get a flu shot each year.     Get a tetanus shot every 10 years.     Talk to your doctor about your pneumonia vaccines. There are now two you should receive - Pneumovax (PPSV 23) and Prevnar (PCV 13).    Talk to your pharmacist about a shingles vaccine.     Talk to your doctor about the hepatitis B vaccine.    Nutrition:     Eat at least 5 servings of fruits and vegetables each day.     Eat whole-grain bread, whole-wheat pasta and brown rice instead of white grains and rice.     Get adequate Calcium and Vitamin D.     Lifestyle    Exercise for at least 150  minutes a week (30 minutes a day, 5 days a week). This will help you control your weight and prevent disease.     Limit alcohol to one drink per day.     No smoking.     Wear sunscreen to prevent skin cancer.     See your dentist every six months for an exam and cleaning.     See your eye doctor every 1 to 2 years to screen for conditions such as glaucoma, macular degeneration and cataracts.    Personalized Prevention Plan  You are due for the preventive services outlined below.  Your care team is available to assist you in scheduling these services.  If you have already completed any of these items, please share that information with your care team to update in your medical record.  Health Maintenance   Topic Date Due     ZOSTER IMMUNIZATION (1 of 2) Never done     Pneumococcal Vaccine: 65+ Years (1 of 1 - PPSV23) Never done     DIABETIC FOOT EXAM  02/11/2022     EYE EXAM  02/11/2022     MICROALBUMIN  08/04/2022     FALL RISK ASSESSMENT  08/04/2022     A1C  09/18/2022     MEDICARE ANNUAL WELLNESS VISIT  03/18/2023     BMP  03/18/2023     LIPID  03/18/2023     DTAP/TDAP/TD IMMUNIZATION (2 - Td or Tdap) 11/25/2023     ADVANCE CARE PLANNING  03/18/2027     COLORECTAL CANCER SCREENING  11/28/2027     PHQ-2 (once per calendar year)  Completed     INFLUENZA VACCINE  Completed     URINALYSIS  Completed     AORTIC ANEURYSM SCREENING (SYSTEM ASSIGNED)  Completed     COVID-19 Vaccine  Completed     HEPATITIS C SCREENING  Addressed     IPV IMMUNIZATION  Aged Out     MENINGITIS IMMUNIZATION  Aged Out       Signs of Hearing Loss      Hearing much better with one ear can be a sign of hearing loss.   Hearing loss is a problem shared by many people. In fact, it is one of the most common health problems, particularly as people age. Most people age 65 and older have some hearing loss. By age 80, almost everyone does. Hearing loss often occurs slowly over the years. So you may not realize your hearing has gotten worse.  Have your  hearing checked  Call your healthcare provider if you:    Have to strain to hear normal conversation    Have to watch other people s faces very carefully to follow what they re saying    Need to ask people to repeat what they ve said    Often misunderstand what people are saying    Turn the volume of the television or radio up so high that others complain    Feel that people are mumbling when they re talking to you    Find that the effort to hear leaves you feeling tired and irritated    Notice, when using the phone, that you hear better with one ear than the other  [employer] last reviewed this educational content on 1/1/2020 2000-2021 [employer]. All rights reserved. This information is not intended as a substitute for professional medical care. Always follow your healthcare professional's instructions.        Your Health Risk Assessment indicates you feel you are not in good emotional health.    Recreation   Recreation is not limited to sports and team events. It includes any activity that provides relaxation, interest, enjoyment, and exercise. Recreation provides an outlet for physical, mental, and social energy. It can give a sense of worth and achievement. It can help you stay healthy.    Mental Exercise and Social Involvement  Mental and emotional health is as important as physical health. Keep in touch with friends and family. Stay as active as possible. Continue to learn and challenge yourself.   Things you can do to stay mentally active are:    Learn something new, like a foreign language or musical instrument.     Play SCRABBLE or do crossword puzzles. If you cannot find people to play these games with you at home, you can play them with others on your computer through the Internet.     Join a games club--anything from card games to chess or checkers or lawn bowling.     Start a new hobby.     Go back to school.     Volunteer.     Read.   Keep up with world events.      Blood pressure is  well controlled at home.     Diabetes is fairly well controlled... Start taking 3 Glipizide daily.     Medications refilled.   Labs are otherwise stable.      Immunization History   Administered Date(s) Administered     COVID-19,PF,Moderna 03/01/2021, 03/29/2021, 11/22/2021     Influenza (IIV3) PF 11/02/2006, 10/26/2007     Influenza Vaccine IM > 6 months Valent IIV4 (Alfuria,Fluzone) 11/06/2019, 11/22/2021     Influenza, Quad, High Dose, Pf, 65yr+ (Fluzone HD) 12/09/2020     TDAP Vaccine (Adacel) 11/25/2013      -- Consider Annual Flu / Influenza vaccination - Every Fall (Starting about October 1st)    -- Pneumonia / Pneumococcal PCV 23 vaccines x1 after age 65 -- can get Anytime.       -- Get the new shingles shot (2 in series) (Shingrix) - from one of the local pharmacies, at your convenience. -- Check cost/coverage.   -- or -- If these are very expensive, go to the Local Public Health Department     [facility] -- [place] [address]    ---- Shot clinic is the FIRST Thursday of Each Month -- from 2 to 6 PM, by Appointment only.     Call for an appointment -- [phone]          Results for orders placed or performed in visit on 03/18/22   Comprehensive metabolic panel     Status: Abnormal   Result Value Ref Range    Sodium 138 134 - 144 mmol/L    Potassium 4.4 3.5 - 5.1 mmol/L    Chloride 103 98 - 107 mmol/L    Carbon Dioxide (CO2) 30 21 - 31 mmol/L    Anion Gap 5 3 - 14 mmol/L    Urea Nitrogen 13 7 - 25 mg/dL    Creatinine 1.01 0.70 - 1.30 mg/dL    Calcium 9.9 8.6 - 10.3 mg/dL    Glucose 164 (H) 70 - 105 mg/dL    Alkaline Phosphatase 83 34 - 104 U/L    AST 16 13 - 39 U/L    ALT 16 7 - 52 U/L    Protein Total 7.6 6.4 - 8.9 g/dL    Albumin 4.4 3.5 - 5.7 g/dL    Bilirubin Total 0.7 0.3 - 1.0 mg/dL    GFR Estimate 82 >60 mL/min/1.73m2   Lipid Profile     Status: None   Result Value Ref Range    Cholesterol 125 <200 mg/dL    Triglycerides  58 <150 mg/dL    Direct Measure HDL 43 23 - 92 mg/dL    LDL Cholesterol Calculated 70 <=100 mg/dL    Non HDL Cholesterol 82 <130 mg/dL    Patient Fasting > 8hrs? Unknown     Narrative    Cholesterol  Desirable:  <200 mg/dL    Triglycerides  Normal:  Less than 150 mg/dL  Borderline High:  150-199 mg/dL  High:  200-499 mg/dL  Very High:  Greater than or equal to 500 mg/dL    Direct Measure HDL  Female:  Greater than or equal to 50 mg/dL   Male:  Greater than or equal to 40 mg/dL    LDL Cholesterol  Desirable:  <100mg/dL  Above Desirable:  100-129 mg/dL   Borderline High:  130-159 mg/dL   High:  160-189 mg/dL   Very High:  >= 190 mg/dL    Non HDL Cholesterol  Desirable:  130 mg/dL  Above Desirable:  130-159 mg/dL  Borderline High:  160-189 mg/dL  High:  190-219 mg/dL  Very High:  Greater than or equal to 220 mg/dL   CBC with platelets     Status: Normal   Result Value Ref Range    WBC Count 6.6 4.0 - 11.0 10e3/uL    RBC Count 5.19 4.40 - 5.90 10e6/uL    Hemoglobin 15.5 13.3 - 17.7 g/dL    Hematocrit 45.6 40.0 - 53.0 %    MCV 88 78 - 100 fL    MCH 29.9 26.5 - 33.0 pg    MCHC 34.0 31.5 - 36.5 g/dL    RDW 13.1 10.0 - 15.0 %    Platelet Count 223 150 - 450 10e3/uL   Hemoglobin A1c     Status: Abnormal   Result Value Ref Range    Hemoglobin A1C 7.9 (H) 4.0 - 6.2 %   TSH with free T4 reflex     Status: Abnormal   Result Value Ref Range    TSH 5.40 (H) 0.40 - 4.00 mU/L   UA reflex to Microscopic     Status: Normal   Result Value Ref Range    Color Urine Yellow Colorless, Straw, Light Yellow, Yellow    Appearance Urine Clear Clear    Glucose Urine Negative Negative mg/dL    Bilirubin Urine Negative Negative    Ketones Urine Negative Negative mg/dL    Specific Gravity Urine 1.007 1.000 - 1.030    Blood Urine Negative Negative    pH Urine 7.5 5.0 - 9.0    Protein Albumin Urine Negative Negative mg/dL    Urobilinogen Urine Normal Normal, 2.0 mg/dL    Nitrite Urine Negative Negative    Leukocyte Esterase Urine Negative Negative     Narrative    Microscopic not indicated      Aspects of Diabetes:   Recent Labs   Lab Test 03/18/22  0658 08/04/21  0716 04/09/21  1029 12/09/20  0737   A1C 7.9* 7.4* 7.6* 7.5*   LDL 70 78 72 70   HDL 43 37 41 45   TRIG 58 75 100 67   ALT 16 15 16 18   CR 1.01 1.00 0.98 1.00   GFRESTIMATED 82 78 77 75   GFRESTBLACK  --   --  >90 >90   POTASSIUM 4.4 4.5 4.5 3.9   TSH 5.40* 5.67*  --   --       Hemoglobin A1c  Goal range is under 8%. Best is 6.5 to 7   Blood Pressure 118/70 Goal to keep less than 140/90   Tobacco  reports that he has never smoked. He has never used smokeless tobacco. Goal to abstain from tobacco   Aspirin or Plavix Anti-platelet therapy Aspirin or Plavix reduces risk of heart disease and stroke  -- sometimes used with other blood thinners, depending on bleeding risk and risk factors.    ACE/ARB Specific blood pressure meds These medications can reduce risk of kidney disease   Cholesterol Statins (Lipitor, Crestor, vs others) Statins reduce risk of heart disease and stroke   Eye Exam -- Do Yearly -- Annual diabetic eye exam   Healthy weight Wt Readings from Last 3 Encounters:   03/18/22 89.4 kg (197 lb)   08/04/21 89.4 kg (197 lb 3.2 oz)   04/09/21 89.4 kg (197 lb 3.2 oz)     Body mass index is 28.85 kg/m .  Goal BMI under 30, best is under 25.      -- Trying to exercise daily (goal at least 20 min/day) with moderate aerobic activity   -- Eat healthy (resources from ADA at http://www.diabetes.org/)   -- Taking good care of my feet. Consider seeing the Podiatrist   -- Check blood sugars as directed, record in log book and bring to every appointment    Insurance companies are grading you and I on your blood sugar control -- Goal is to get your A1c down to 7.9% or lower and NO Smoking!  -- Medicare and most insurance companies, will only cover Hemoglobin A1c labs to be rechecked every 91+ days.      Return for Diabetes labs and clinic follow-up appointment every 3 to 4 months.    Schedule lab only  appointment --- A few days AFTER: 6/16/22   Schedule clinic appointment with Dr. Wilson -- Same day as labs, or 1-2 days later.

## 2022-03-18 NOTE — PROGRESS NOTES
The patient was provided with written information regarding signs of hearing loss.  The patient was provided with suggestions to help him develop a healthy emotional lifestyle.

## 2022-03-19 ASSESSMENT — ANXIETY QUESTIONNAIRES: GAD7 TOTAL SCORE: 0

## 2022-03-19 ASSESSMENT — PATIENT HEALTH QUESTIONNAIRE - PHQ9: SUM OF ALL RESPONSES TO PHQ QUESTIONS 1-9: 4

## 2022-07-20 ENCOUNTER — LAB (OUTPATIENT)
Dept: LAB | Facility: OTHER | Age: 67
End: 2022-07-20
Attending: INTERNAL MEDICINE
Payer: MEDICARE

## 2022-07-20 ENCOUNTER — OFFICE VISIT (OUTPATIENT)
Dept: INTERNAL MEDICINE | Facility: OTHER | Age: 67
End: 2022-07-20
Attending: INTERNAL MEDICINE
Payer: MEDICARE

## 2022-07-20 VITALS
TEMPERATURE: 97.4 F | RESPIRATION RATE: 16 BRPM | BODY MASS INDEX: 27.8 KG/M2 | HEIGHT: 70 IN | OXYGEN SATURATION: 98 % | DIASTOLIC BLOOD PRESSURE: 74 MMHG | HEART RATE: 91 BPM | SYSTOLIC BLOOD PRESSURE: 138 MMHG | WEIGHT: 194.2 LBS

## 2022-07-20 DIAGNOSIS — E11.8 CONTROLLED TYPE 2 DIABETES MELLITUS WITH COMPLICATION, WITH LONG-TERM CURRENT USE OF INSULIN (H): Primary | ICD-10-CM

## 2022-07-20 DIAGNOSIS — Z71.85 VACCINE COUNSELING: ICD-10-CM

## 2022-07-20 DIAGNOSIS — E78.2 MIXED HYPERLIPIDEMIA: ICD-10-CM

## 2022-07-20 DIAGNOSIS — Z79.4 CONTROLLED TYPE 2 DIABETES MELLITUS WITH COMPLICATION, WITH LONG-TERM CURRENT USE OF INSULIN (H): Primary | ICD-10-CM

## 2022-07-20 DIAGNOSIS — E89.2 POSTPROCEDURAL HYPOPARATHYROIDISM (H): ICD-10-CM

## 2022-07-20 DIAGNOSIS — E11.8 CONTROLLED TYPE 2 DIABETES MELLITUS WITH COMPLICATION, WITH LONG-TERM CURRENT USE OF INSULIN (H): ICD-10-CM

## 2022-07-20 DIAGNOSIS — I10 BENIGN ESSENTIAL HYPERTENSION: ICD-10-CM

## 2022-07-20 DIAGNOSIS — N18.2 CKD (CHRONIC KIDNEY DISEASE) STAGE 2, GFR 60-89 ML/MIN: ICD-10-CM

## 2022-07-20 DIAGNOSIS — Z79.4 CONTROLLED TYPE 2 DIABETES MELLITUS WITH COMPLICATION, WITH LONG-TERM CURRENT USE OF INSULIN (H): ICD-10-CM

## 2022-07-20 LAB
ALBUMIN SERPL-MCNC: 4.4 G/DL (ref 3.5–5.7)
ALBUMIN UR-MCNC: NEGATIVE MG/DL
ALP SERPL-CCNC: 76 U/L (ref 34–104)
ALT SERPL W P-5'-P-CCNC: 16 U/L (ref 7–52)
ANION GAP SERPL CALCULATED.3IONS-SCNC: 7 MMOL/L (ref 3–14)
APPEARANCE UR: CLEAR
AST SERPL W P-5'-P-CCNC: 17 U/L (ref 13–39)
BILIRUB SERPL-MCNC: 0.5 MG/DL (ref 0.3–1)
BILIRUB UR QL STRIP: NEGATIVE
BUN SERPL-MCNC: 16 MG/DL (ref 7–25)
CALCIUM SERPL-MCNC: 9.7 MG/DL (ref 8.6–10.3)
CHLORIDE BLD-SCNC: 103 MMOL/L (ref 98–107)
CHOLEST SERPL-MCNC: 117 MG/DL
CO2 SERPL-SCNC: 28 MMOL/L (ref 21–31)
COLOR UR AUTO: YELLOW
CREAT SERPL-MCNC: 0.96 MG/DL (ref 0.7–1.3)
CREAT UR-MCNC: 27.2 MG/DL
ERYTHROCYTE [DISTWIDTH] IN BLOOD BY AUTOMATED COUNT: 13.3 % (ref 10–15)
FASTING STATUS PATIENT QL REPORTED: YES
GFR SERPL CREATININE-BSD FRML MDRD: 87 ML/MIN/1.73M2
GLUCOSE BLD-MCNC: 74 MG/DL (ref 70–105)
GLUCOSE UR STRIP-MCNC: NEGATIVE MG/DL
HBA1C MFR BLD: 7.2 % (ref 4–6.2)
HCT VFR BLD AUTO: 44.6 % (ref 40–53)
HDLC SERPL-MCNC: 40 MG/DL (ref 23–92)
HGB BLD-MCNC: 15.5 G/DL (ref 13.3–17.7)
HGB UR QL STRIP: NEGATIVE
KETONES UR STRIP-MCNC: NEGATIVE MG/DL
LDLC SERPL CALC-MCNC: 61 MG/DL
LEUKOCYTE ESTERASE UR QL STRIP: NEGATIVE
MCH RBC QN AUTO: 30.3 PG (ref 26.5–33)
MCHC RBC AUTO-ENTMCNC: 34.8 G/DL (ref 31.5–36.5)
MCV RBC AUTO: 87 FL (ref 78–100)
MICROALBUMIN UR-MCNC: <12 MG/L
MICROALBUMIN/CREAT UR: NORMAL MG/G{CREAT}
NITRATE UR QL: NEGATIVE
NONHDLC SERPL-MCNC: 77 MG/DL
PH UR STRIP: 7.5 [PH] (ref 5–9)
PLATELET # BLD AUTO: 236 10E3/UL (ref 150–450)
POTASSIUM BLD-SCNC: 4.1 MMOL/L (ref 3.5–5.1)
PROT SERPL-MCNC: 7.3 G/DL (ref 6.4–8.9)
RBC # BLD AUTO: 5.12 10E6/UL (ref 4.4–5.9)
SODIUM SERPL-SCNC: 138 MMOL/L (ref 134–144)
SP GR UR STRIP: 1.01 (ref 1–1.03)
T4 FREE SERPL-MCNC: 0.83 NG/DL (ref 0.6–1.6)
TRIGL SERPL-MCNC: 78 MG/DL
TSH SERPL DL<=0.005 MIU/L-ACNC: 5.27 MU/L (ref 0.4–4)
UROBILINOGEN UR STRIP-MCNC: NORMAL MG/DL
WBC # BLD AUTO: 8.2 10E3/UL (ref 4–11)

## 2022-07-20 PROCEDURE — 99214 OFFICE O/P EST MOD 30 MIN: CPT | Performed by: INTERNAL MEDICINE

## 2022-07-20 PROCEDURE — 82043 UR ALBUMIN QUANTITATIVE: CPT | Mod: ZL

## 2022-07-20 PROCEDURE — 80061 LIPID PANEL: CPT | Mod: ZL

## 2022-07-20 PROCEDURE — 84439 ASSAY OF FREE THYROXINE: CPT | Mod: ZL

## 2022-07-20 PROCEDURE — 81003 URINALYSIS AUTO W/O SCOPE: CPT | Mod: ZL

## 2022-07-20 PROCEDURE — 83036 HEMOGLOBIN GLYCOSYLATED A1C: CPT | Mod: ZL

## 2022-07-20 PROCEDURE — 84443 ASSAY THYROID STIM HORMONE: CPT | Mod: ZL

## 2022-07-20 PROCEDURE — 82040 ASSAY OF SERUM ALBUMIN: CPT | Mod: ZL

## 2022-07-20 PROCEDURE — 85027 COMPLETE CBC AUTOMATED: CPT | Mod: ZL

## 2022-07-20 PROCEDURE — 80053 COMPREHEN METABOLIC PANEL: CPT | Mod: ZL

## 2022-07-20 PROCEDURE — 36415 COLL VENOUS BLD VENIPUNCTURE: CPT | Mod: ZL

## 2022-07-20 PROCEDURE — G0463 HOSPITAL OUTPT CLINIC VISIT: HCPCS

## 2022-07-20 RX ORDER — LOSARTAN POTASSIUM 50 MG/1
50 TABLET ORAL DAILY
Qty: 90 TABLET | Refills: 1 | Status: SHIPPED | OUTPATIENT
Start: 2022-07-20 | End: 2022-11-01

## 2022-07-20 RX ORDER — INSULIN GLARGINE 100 [IU]/ML
INJECTION, SOLUTION SUBCUTANEOUS
Qty: 15 ML | Refills: 1 | Status: SHIPPED | OUTPATIENT
Start: 2022-07-20 | End: 2022-11-01

## 2022-07-20 ASSESSMENT — PAIN SCALES - GENERAL: PAINLEVEL: SEVERE PAIN (7)

## 2022-07-20 ASSESSMENT — ENCOUNTER SYMPTOMS
AGITATION: 0
NUMBNESS: 1
EYE PAIN: 0
ABDOMINAL PAIN: 0
NAUSEA: 0
DIARRHEA: 0
SHORTNESS OF BREATH: 0
WHEEZING: 0
FEVER: 0
CHILLS: 0
BRUISES/BLEEDS EASILY: 0
COUGH: 0
FATIGUE: 0
PALPITATIONS: 0
MYALGIAS: 0
DYSURIA: 0
CONFUSION: 0
LIGHT-HEADEDNESS: 0
HEMATURIA: 0
DIZZINESS: 0
VOMITING: 0
ARTHRALGIAS: 0

## 2022-07-20 NOTE — PROGRESS NOTES
Assessment & Plan       ICD-10-CM    1. Controlled type 2 diabetes mellitus with complication, with long-term current use of insulin (H)  E11.8 insulin glargine (LANTUS SOLOSTAR) 100 UNIT/ML pen    Z79.4 metFORMIN (GLUCOPHAGE) 1000 MG tablet   2. CKD (chronic kidney disease) stage 2, GFR 60-89 ml/min  N18.2    3. Benign essential hypertension  I10 losartan (COZAAR) 50 MG tablet   4. Mixed hyperlipidemia  E78.2    5. Postprocedural hypoparathyroidism (H)  E89.2    6. Vaccine counseling  Z71.85      Type 2 diabetes, currently well controlled.  Patient made some changes and his blood sugars have now improved.  He is trying to watch his diet better.  Drinking less pop/soda.  Has been drinking more water.  Overall is feeling much better.  Labs have improved.  Needs medication refills.  Doing well with current medication regimen.  Denies hypoglycemia.  Continues on glipizide, Lantus insulin, metformin.  Prescription sent to pharmacy.    Stage II chronic kidney disease.  Kidney function has improved.  It is the best it has been now in more than a year and a half.  He was having a decline in kidney function.  States that he is drinking more water and hydrating better.  Encouraged ongoing water consumption at current rates.  His kidney function and diabetes have both improved.  Plan to recheck labs again in 3 to 4 months.    Hypertension, blood pressure is currently well controlled.  Denies syncope or presyncope.  Doing well with current medication regimen.  Needs refills of losartan.  Kidney function has improved.    Mixed hyperlipidemia.  Cholesterol panel is all at goal.  Doing well with Lipitor.  Tolerating well without side effects.  No changes.    Postprocedural hypoparathyroidism.  Appears stable.  He had history of parathyroid surgery.  Calcium levels are normal.    Vaccine counseling completed.  Encouraged consideration of pneumococcal vaccination, shingles vaccination, fourth COVID shot.    Encouraged weight loss  and regular exercise.     Return in about 3 months (around 10/20/2022) for - Labs every 91+ days, DM Follow-up 1-2 days later, with Dr. Wilson.    Moshe Wilson MD  [facility]     Kendal Zhang is a 67 year old, presenting for the following health issues:  Diabetes      History of Present Illness       Diabetes:   He presents for follow up of diabetes.  He is checking home blood glucose three times daily. He checks blood glucose before and after meals.  Blood glucose is never over 200 and sometimes under 70. He is aware of hypoglycemia symptoms including weakness. He has no concerns regarding his diabetes at this time.  He is having numbness in feet, burning in feet and blurry vision. The patient has not had a diabetic eye exam in the last 12 months.         He eats 2-3 servings of fruits and vegetables daily.He consumes 0 sweetened beverage(s) daily.He exercises with enough effort to increase his heart rate 30 to 60 minutes per day.  He exercises with enough effort to increase his heart rate 6 days per week.   He is taking medications regularly.     Review of Systems   Constitutional: Negative for chills, fatigue and fever.   HENT: Negative for congestion and hearing loss.    Eyes: Negative for pain and visual disturbance.   Respiratory: Negative for cough, shortness of breath and wheezing.    Cardiovascular: Negative for chest pain and palpitations.   Gastrointestinal: Negative for abdominal pain, diarrhea, nausea and vomiting.   Endocrine: Negative for cold intolerance and heat intolerance.   Genitourinary: Negative for dysuria and hematuria.   Musculoskeletal: Negative for arthralgias and myalgias.        Right 4th finger, in palm. starting to develop trigger finger, improved with regular home therapy   Skin: Negative for pallor.   Allergic/Immunologic: Negative for immunocompromised state.   Neurological: Positive for numbness (+ bilateral feet, comes and goes -- better with better  "glucose control). Negative for dizziness and light-headedness.   Hematological: Does not bruise/bleed easily.   Psychiatric/Behavioral: Negative for agitation and confusion.          Objective    /74 (BP Location: Right arm, Patient Position: Sitting, Cuff Size: Adult Large)   Pulse 91   Temp 97.4  F (36.3  C) (Temporal)   Resp 16   Ht 1.778 m (5' 10\")   Wt 88.1 kg (194 lb 3.2 oz)   SpO2 98%   BMI 27.86 kg/m    Body mass index is 27.86 kg/m .  Physical Exam  Constitutional:       General: He is not in acute distress.     Appearance: He is well-developed. He is not diaphoretic.   HENT:      Head: Normocephalic and atraumatic.   Eyes:      General: No scleral icterus.     Conjunctiva/sclera: Conjunctivae normal.   Neck:      Vascular: No carotid bruit.   Cardiovascular:      Rate and Rhythm: Normal rate and regular rhythm.      Pulses: Normal pulses.   Pulmonary:      Effort: Pulmonary effort is normal.      Breath sounds: Normal breath sounds.   Abdominal:      Palpations: Abdomen is soft.      Tenderness: There is no abdominal tenderness.   Musculoskeletal:         General: No tenderness or deformity.      Cervical back: Neck supple.      Right lower leg: No edema.      Left lower leg: No edema.      Comments: right 4th finger, in palm. starting to develop trigger finger with mild contracture   Lymphadenopathy:      Cervical: No cervical adenopathy.   Skin:     General: Skin is warm and dry.      Findings: No rash.   Neurological:      Mental Status: He is alert and oriented to person, place, and time. Mental status is at baseline.   Psychiatric:         Mood and Affect: Mood normal.         Behavior: Behavior normal.          Recent Labs   Lab Test 07/20/22  0653 03/18/22  0658 08/04/21  0716 08/04/21  0716 04/09/21  1029 12/09/20  0737   A1C 7.2* 7.9*  --  7.4* 7.6* 7.5*   LDL 61 70  --  78 72 70   HDL 40 43  --  37 41 45   TRIG 78 58  --  75 100 67   ALT 16 16  --  15 16 18   CR 0.96 1.01   < > 1.00 " 0.98 1.00   GFRESTIMATED 87 82   < > 78 77 75   GFRESTBLACK  --   --   --   --  >90 >90   POTASSIUM 4.1 4.4   < > 4.5 4.5 3.9   TSH  --  5.40*  --  5.67*  --   --     < > = values in this interval not displayed.   Hemoglobin A1c is well controlled.  LDL HDL and triglycerides are at goal.  ALT is normal.  Creatinine has improved.  Potassium is normal.              .  ..

## 2022-07-20 NOTE — PATIENT INSTRUCTIONS
Blood pressure is well controlled.   Diabetes is well controlled.     Medications refilled.   Labs are stable.     Immunization History   Administered Date(s) Administered    COVID-19,PF,Moderna 03/01/2021, 03/29/2021, 11/22/2021    Influenza (IIV3) PF 11/02/2006, 10/26/2007    Influenza Vaccine IM > 6 months Valent IIV4 (Alfuria,Fluzone) 11/06/2019, 11/22/2021    Influenza, Quad, High Dose, Pf, 65yr+ (Fluzone HD) 12/09/2020    TDAP Vaccine (Adacel) 11/25/2013      -- Get the 4th COVID-19 Vaccination series shot at your convenience -- Currently for 50 and older adults, or 12 and older for those who are immunocompromised.     -- Consider the **Pfizer or Moderna** vaccine --    -- Consider the Pneumococcal 23 vaccine.    -- Consider Annual Flu / Influenza vaccination - Every Fall (Starting about October 1st)      -- Get the new shingles shot (2 in series) (Shingrix) - from one of the local pharmacies, at your convenience. -- Check cost/coverage.   -- or -- If these are very expensive, go to the Local Public Health Department     [facility] -- [place] [address]    ---- Shot clinic is the FIRST Thursday of Each Month -- from 2 to 6 PM, by Appointment only.     Call for an appointment -- [phone]          Results for orders placed or performed in visit on 07/20/22   Comprehensive metabolic panel     Status: Normal   Result Value Ref Range    Sodium 138 134 - 144 mmol/L    Potassium 4.1 3.5 - 5.1 mmol/L    Chloride 103 98 - 107 mmol/L    Carbon Dioxide (CO2) 28 21 - 31 mmol/L    Anion Gap 7 3 - 14 mmol/L    Urea Nitrogen 16 7 - 25 mg/dL    Creatinine 0.96 0.70 - 1.30 mg/dL    Calcium 9.7 8.6 - 10.3 mg/dL    Glucose 74 70 - 105 mg/dL    Alkaline Phosphatase 76 34 - 104 U/L    AST 17 13 - 39 U/L    ALT 16 7 - 52 U/L    Protein Total 7.3 6.4 - 8.9 g/dL    Albumin 4.4 3.5 - 5.7 g/dL    Bilirubin Total 0.5 0.3 - 1.0 mg/dL    GFR Estimate 87 >60  mL/min/1.73m2   Lipid Profile     Status: None   Result Value Ref Range    Cholesterol 117 <200 mg/dL    Triglycerides 78 <150 mg/dL    Direct Measure HDL 40 23 - 92 mg/dL    LDL Cholesterol Calculated 61 <=100 mg/dL    Non HDL Cholesterol 77 <130 mg/dL    Patient Fasting > 8hrs? Yes     Narrative    Cholesterol  Desirable:  <200 mg/dL    Triglycerides  Normal:  Less than 150 mg/dL  Borderline High:  150-199 mg/dL  High:  200-499 mg/dL  Very High:  Greater than or equal to 500 mg/dL    Direct Measure HDL  Female:  Greater than or equal to 50 mg/dL   Male:  Greater than or equal to 40 mg/dL    LDL Cholesterol  Desirable:  <100mg/dL  Above Desirable:  100-129 mg/dL   Borderline High:  130-159 mg/dL   High:  160-189 mg/dL   Very High:  >= 190 mg/dL    Non HDL Cholesterol  Desirable:  130 mg/dL  Above Desirable:  130-159 mg/dL  Borderline High:  160-189 mg/dL  High:  190-219 mg/dL  Very High:  Greater than or equal to 220 mg/dL   CBC with platelets     Status: Normal   Result Value Ref Range    WBC Count 8.2 4.0 - 11.0 10e3/uL    RBC Count 5.12 4.40 - 5.90 10e6/uL    Hemoglobin 15.5 13.3 - 17.7 g/dL    Hematocrit 44.6 40.0 - 53.0 %    MCV 87 78 - 100 fL    MCH 30.3 26.5 - 33.0 pg    MCHC 34.8 31.5 - 36.5 g/dL    RDW 13.3 10.0 - 15.0 %    Platelet Count 236 150 - 450 10e3/uL   Hemoglobin A1c     Status: Abnormal   Result Value Ref Range    Hemoglobin A1C 7.2 (H) 4.0 - 6.2 %   UA reflex to Microscopic     Status: Normal   Result Value Ref Range    Color Urine Yellow Colorless, Straw, Light Yellow, Yellow    Appearance Urine Clear Clear    Glucose Urine Negative Negative mg/dL    Bilirubin Urine Negative Negative    Ketones Urine Negative Negative mg/dL    Specific Gravity Urine 1.006 1.000 - 1.030    Blood Urine Negative Negative    pH Urine 7.5 5.0 - 9.0    Protein Albumin Urine Negative Negative mg/dL    Urobilinogen Urine Normal Normal, 2.0 mg/dL    Nitrite Urine Negative Negative    Leukocyte Esterase Urine Negative  Negative    Narrative    Microscopic not indicated      Aspects of Diabetes:   Recent Labs   Lab Test 07/20/22  0653 03/18/22  0658 08/04/21  0716 08/04/21  0716 04/09/21  1029 12/09/20  0737   A1C 7.2* 7.9*  --  7.4* 7.6* 7.5*   LDL 61 70  --  78 72 70   HDL 40 43  --  37 41 45   TRIG 78 58  --  75 100 67   ALT 16 16  --  15 16 18   CR 0.96 1.01   < > 1.00 0.98 1.00   GFRESTIMATED 87 82   < > 78 77 75   GFRESTBLACK  --   --   --   --  >90 >90   POTASSIUM 4.1 4.4   < > 4.5 4.5 3.9   TSH  --  5.40*  --  5.67*  --   --     < > = values in this interval not displayed.      Hemoglobin A1c  Goal range is under 8%. Best is 6.5 to 7   Blood Pressure 138/74 Goal to keep less than 140/90   Tobacco  reports that he has never smoked. He has never used smokeless tobacco. Goal to abstain from tobacco   Aspirin or Plavix Anti-platelet therapy Aspirin or Plavix reduces risk of heart disease and stroke  -- sometimes used with other blood thinners, depending on bleeding risk and risk factors.    ACE/ARB Specific blood pressure meds These medications can reduce risk of kidney disease   Cholesterol Statins (Lipitor, Crestor, vs others) Statins reduce risk of heart disease and stroke   Eye Exam -- Do Yearly -- Annual diabetic eye exam   Healthy weight Wt Readings from Last 3 Encounters:   07/20/22 88.1 kg (194 lb 3.2 oz)   03/18/22 89.4 kg (197 lb)   08/04/21 89.4 kg (197 lb 3.2 oz)     Body mass index is 27.86 kg/m .  Goal BMI under 30, best is under 25.      -- Trying to exercise daily (goal at least 20 min/day) with moderate aerobic activity   -- Eat healthy (resources from ADA at http://www.diabetes.org/)   -- Taking good care of my feet. Consider seeing the Podiatrist   -- Check blood sugars as directed, record in log book and bring to every appointment    Insurance [employer] are grading you and I on your blood sugar control -- Goal is to get your A1c down to 7.9% or lower and NO Smoking!  -- Medicare and most insurance companies,  will only cover Hemoglobin A1c labs to be rechecked every 91+ days.      Return for Diabetes labs and clinic follow-up appointment every 3 to 4 months.    Schedule lab only appointment --- A few days AFTER: 10/18/22   Schedule clinic appointment with Dr. Wilson -- Same day as labs, or 1-2 days later.

## 2022-07-31 DIAGNOSIS — Z79.4 CONTROLLED TYPE 2 DIABETES MELLITUS WITH COMPLICATION, WITH LONG-TERM CURRENT USE OF INSULIN (H): ICD-10-CM

## 2022-07-31 DIAGNOSIS — E11.8 CONTROLLED TYPE 2 DIABETES MELLITUS WITH COMPLICATION, WITH LONG-TERM CURRENT USE OF INSULIN (H): ICD-10-CM

## 2022-08-01 NOTE — TELEPHONE ENCOUNTER
"Last Prescription Date: 5/26/21, patient reported  Pharmacy requesting qty 400/3 RF  Last Office Visit: 7/20/22  Future Office Visit: 11/1/22     Micki Palacios RN on 8/1/2022 at 11:51 AM    Requested Prescriptions   Pending Prescriptions Disp Refills     CONTOUR NEXT TEST test strip [Pharmacy Med Name: CONTOUR NEXT TEST STRIP]  3     Sig: USE TO TEST BLOOD SUGAR 4 TIMES DAILY.       Diabetic Supplies Protocol Failed - 7/31/2022  6:59 PM        Failed - Medication is active on med list        Passed - Patient is 18 years of age or older        Passed - Recent (6 mo) or future (30 days) visit within the authorizing provider's specialty     Patient had office visit in the last 6 months or has a visit in the next 30 days with authorizing provider.  See \"Patient Info\" tab in inbasket, or \"Choose Columns\" in Meds & Orders section of the refill encounter.               "

## 2022-11-01 ENCOUNTER — LAB (OUTPATIENT)
Dept: LAB | Facility: OTHER | Age: 67
End: 2022-11-01
Attending: INTERNAL MEDICINE
Payer: MEDICARE

## 2022-11-01 ENCOUNTER — OFFICE VISIT (OUTPATIENT)
Dept: INTERNAL MEDICINE | Facility: OTHER | Age: 67
End: 2022-11-01
Attending: INTERNAL MEDICINE
Payer: MEDICARE

## 2022-11-01 VITALS
TEMPERATURE: 96.7 F | HEART RATE: 89 BPM | HEIGHT: 70 IN | RESPIRATION RATE: 18 BRPM | BODY MASS INDEX: 27.69 KG/M2 | WEIGHT: 193.4 LBS | SYSTOLIC BLOOD PRESSURE: 136 MMHG | DIASTOLIC BLOOD PRESSURE: 76 MMHG | OXYGEN SATURATION: 98 %

## 2022-11-01 DIAGNOSIS — E78.2 MIXED HYPERLIPIDEMIA: ICD-10-CM

## 2022-11-01 DIAGNOSIS — Z79.4 CONTROLLED TYPE 2 DIABETES MELLITUS WITH COMPLICATION, WITH LONG-TERM CURRENT USE OF INSULIN (H): ICD-10-CM

## 2022-11-01 DIAGNOSIS — N18.2 CKD (CHRONIC KIDNEY DISEASE) STAGE 2, GFR 60-89 ML/MIN: ICD-10-CM

## 2022-11-01 DIAGNOSIS — E11.8 CONTROLLED TYPE 2 DIABETES MELLITUS WITH COMPLICATION, WITH LONG-TERM CURRENT USE OF INSULIN (H): ICD-10-CM

## 2022-11-01 DIAGNOSIS — I10 BENIGN ESSENTIAL HYPERTENSION: Primary | ICD-10-CM

## 2022-11-01 LAB
ALBUMIN SERPL-MCNC: 4.4 G/DL (ref 3.5–5.7)
ALBUMIN UR-MCNC: NEGATIVE MG/DL
ALP SERPL-CCNC: 72 U/L (ref 34–104)
ALT SERPL W P-5'-P-CCNC: 13 U/L (ref 7–52)
ANION GAP SERPL CALCULATED.3IONS-SCNC: 8 MMOL/L (ref 3–14)
APPEARANCE UR: CLEAR
AST SERPL W P-5'-P-CCNC: 15 U/L (ref 13–39)
BILIRUB SERPL-MCNC: 0.6 MG/DL (ref 0.3–1)
BILIRUB UR QL STRIP: NEGATIVE
BUN SERPL-MCNC: 14 MG/DL (ref 7–25)
CALCIUM SERPL-MCNC: 9.7 MG/DL (ref 8.6–10.3)
CHLORIDE BLD-SCNC: 101 MMOL/L (ref 98–107)
CHOLEST SERPL-MCNC: 117 MG/DL
CO2 SERPL-SCNC: 28 MMOL/L (ref 21–31)
COLOR UR AUTO: YELLOW
CREAT SERPL-MCNC: 0.96 MG/DL (ref 0.7–1.3)
CREAT UR-MCNC: 37.9 MG/DL
ERYTHROCYTE [DISTWIDTH] IN BLOOD BY AUTOMATED COUNT: 13.2 % (ref 10–15)
FASTING STATUS PATIENT QL REPORTED: YES
GFR SERPL CREATININE-BSD FRML MDRD: 87 ML/MIN/1.73M2
GLUCOSE BLD-MCNC: 121 MG/DL (ref 70–105)
GLUCOSE UR STRIP-MCNC: NEGATIVE MG/DL
HBA1C MFR BLD: 7.6 % (ref 4–6.2)
HCT VFR BLD AUTO: 45.3 % (ref 40–53)
HDLC SERPL-MCNC: 42 MG/DL (ref 23–92)
HGB BLD-MCNC: 15.5 G/DL (ref 13.3–17.7)
HGB UR QL STRIP: NEGATIVE
KETONES UR STRIP-MCNC: NEGATIVE MG/DL
LDLC SERPL CALC-MCNC: 61 MG/DL
LEUKOCYTE ESTERASE UR QL STRIP: NEGATIVE
MCH RBC QN AUTO: 29.8 PG (ref 26.5–33)
MCHC RBC AUTO-ENTMCNC: 34.2 G/DL (ref 31.5–36.5)
MCV RBC AUTO: 87 FL (ref 78–100)
MICROALBUMIN UR-MCNC: <12 MG/L
MICROALBUMIN/CREAT UR: NORMAL MG/G{CREAT}
NITRATE UR QL: NEGATIVE
NONHDLC SERPL-MCNC: 75 MG/DL
PH UR STRIP: 6.5 [PH] (ref 5–9)
PLATELET # BLD AUTO: 250 10E3/UL (ref 150–450)
POTASSIUM BLD-SCNC: 4.5 MMOL/L (ref 3.5–5.1)
PROT SERPL-MCNC: 7.5 G/DL (ref 6.4–8.9)
RBC # BLD AUTO: 5.2 10E6/UL (ref 4.4–5.9)
SODIUM SERPL-SCNC: 137 MMOL/L (ref 134–144)
SP GR UR STRIP: 1.01 (ref 1–1.03)
T4 FREE SERPL-MCNC: 0.9 NG/DL (ref 0.6–1.6)
TRIGL SERPL-MCNC: 68 MG/DL
TSH SERPL DL<=0.005 MIU/L-ACNC: 5.09 MU/L (ref 0.4–4)
UROBILINOGEN UR STRIP-MCNC: NORMAL MG/DL
WBC # BLD AUTO: 7.5 10E3/UL (ref 4–11)

## 2022-11-01 PROCEDURE — G0463 HOSPITAL OUTPT CLINIC VISIT: HCPCS

## 2022-11-01 PROCEDURE — 81003 URINALYSIS AUTO W/O SCOPE: CPT | Mod: ZL

## 2022-11-01 PROCEDURE — 85018 HEMOGLOBIN: CPT | Mod: ZL

## 2022-11-01 PROCEDURE — 82043 UR ALBUMIN QUANTITATIVE: CPT | Mod: ZL

## 2022-11-01 PROCEDURE — 83036 HEMOGLOBIN GLYCOSYLATED A1C: CPT | Mod: ZL

## 2022-11-01 PROCEDURE — 36415 COLL VENOUS BLD VENIPUNCTURE: CPT | Mod: ZL

## 2022-11-01 PROCEDURE — G0008 ADMIN INFLUENZA VIRUS VAC: HCPCS

## 2022-11-01 PROCEDURE — 84439 ASSAY OF FREE THYROXINE: CPT | Mod: ZL

## 2022-11-01 PROCEDURE — 84443 ASSAY THYROID STIM HORMONE: CPT | Mod: ZL

## 2022-11-01 PROCEDURE — 99214 OFFICE O/P EST MOD 30 MIN: CPT | Performed by: INTERNAL MEDICINE

## 2022-11-01 PROCEDURE — G0463 HOSPITAL OUTPT CLINIC VISIT: HCPCS | Mod: 25

## 2022-11-01 PROCEDURE — 80053 COMPREHEN METABOLIC PANEL: CPT | Mod: ZL

## 2022-11-01 PROCEDURE — 80061 LIPID PANEL: CPT | Mod: ZL

## 2022-11-01 RX ORDER — INSULIN GLARGINE 100 [IU]/ML
INJECTION, SOLUTION SUBCUTANEOUS
Qty: 15 ML | Refills: 1 | Status: SHIPPED | OUTPATIENT
Start: 2022-11-01 | End: 2023-02-10

## 2022-11-01 RX ORDER — FLURBIPROFEN SODIUM 0.3 MG/ML
SOLUTION/ DROPS OPHTHALMIC DAILY
Qty: 200 EACH | Refills: 4 | Status: SHIPPED | OUTPATIENT
Start: 2022-11-01 | End: 2023-12-26

## 2022-11-01 RX ORDER — LOSARTAN POTASSIUM 50 MG/1
50 TABLET ORAL DAILY
Qty: 90 TABLET | Refills: 1 | Status: SHIPPED | OUTPATIENT
Start: 2022-11-01 | End: 2023-02-10

## 2022-11-01 RX ORDER — GLIPIZIDE 10 MG/1
TABLET ORAL
Qty: 270 TABLET | Refills: 4 | Status: SHIPPED | OUTPATIENT
Start: 2022-11-01 | End: 2023-09-07

## 2022-11-01 ASSESSMENT — ENCOUNTER SYMPTOMS
CHILLS: 0
CONFUSION: 0
DIARRHEA: 0
NUMBNESS: 1
NAUSEA: 0
MYALGIAS: 0
COUGH: 0
WHEEZING: 0
SHORTNESS OF BREATH: 0
BRUISES/BLEEDS EASILY: 0
PALPITATIONS: 0
EYE PAIN: 0
VOMITING: 0
FEVER: 0
HEMATURIA: 0
AGITATION: 0
FATIGUE: 0
DYSURIA: 0
LIGHT-HEADEDNESS: 0
ABDOMINAL PAIN: 0
ARTHRALGIAS: 0
DIZZINESS: 0

## 2022-11-01 ASSESSMENT — PAIN SCALES - GENERAL: PAINLEVEL: SEVERE PAIN (7)

## 2022-11-01 NOTE — PATIENT INSTRUCTIONS
Controlled type 2 diabetes mellitus with complication, with long-term current use of insulin (H)  - glipiZIDE (GLUCOTROL) 10 MG tablet; Take 1 tablet (10 mg) by mouth 3 times daily (with meals) AND 1 tablet (10 mg) daily (with dinner). - total of 40 mg daily -- Dose Increase 11/1/2022      Consider adding Jardiance 10 mg once daily -- to help weight / kidneys / heart / diabetes.     Blood pressure is well controlled.   Diabetes is well controlled. -- just creeping up slightly.     Medications refilled.   Labs are otherwise stable.     Results for orders placed or performed in visit on 11/01/22   Comprehensive metabolic panel     Status: Abnormal   Result Value Ref Range    Sodium 137 134 - 144 mmol/L    Potassium 4.5 3.5 - 5.1 mmol/L    Chloride 101 98 - 107 mmol/L    Carbon Dioxide (CO2) 28 21 - 31 mmol/L    Anion Gap 8 3 - 14 mmol/L    Urea Nitrogen 14 7 - 25 mg/dL    Creatinine 0.96 0.70 - 1.30 mg/dL    Calcium 9.7 8.6 - 10.3 mg/dL    Glucose 121 (H) 70 - 105 mg/dL    Alkaline Phosphatase 72 34 - 104 U/L    AST 15 13 - 39 U/L    ALT 13 7 - 52 U/L    Protein Total 7.5 6.4 - 8.9 g/dL    Albumin 4.4 3.5 - 5.7 g/dL    Bilirubin Total 0.6 0.3 - 1.0 mg/dL    GFR Estimate 87 >60 mL/min/1.73m2   Lipid Profile     Status: None   Result Value Ref Range    Cholesterol 117 <200 mg/dL    Triglycerides 68 <150 mg/dL    Direct Measure HDL 42 23 - 92 mg/dL    LDL Cholesterol Calculated 61 <=100 mg/dL    Non HDL Cholesterol 75 <130 mg/dL    Patient Fasting > 8hrs? Yes     Narrative    Cholesterol  Desirable:  <200 mg/dL    Triglycerides  Normal:  Less than 150 mg/dL  Borderline High:  150-199 mg/dL  High:  200-499 mg/dL  Very High:  Greater than or equal to 500 mg/dL    Direct Measure HDL  Female:  Greater than or equal to 50 mg/dL   Male:  Greater than or equal to 40 mg/dL    LDL Cholesterol  Desirable:  <100mg/dL  Above Desirable:  100-129 mg/dL   Borderline High:  130-159 mg/dL   High:  160-189 mg/dL   Very High:  >= 190  mg/dL    Non HDL Cholesterol  Desirable:  130 mg/dL  Above Desirable:  130-159 mg/dL  Borderline High:  160-189 mg/dL  High:  190-219 mg/dL  Very High:  Greater than or equal to 220 mg/dL   CBC with platelets     Status: Normal   Result Value Ref Range    WBC Count 7.5 4.0 - 11.0 10e3/uL    RBC Count 5.20 4.40 - 5.90 10e6/uL    Hemoglobin 15.5 13.3 - 17.7 g/dL    Hematocrit 45.3 40.0 - 53.0 %    MCV 87 78 - 100 fL    MCH 29.8 26.5 - 33.0 pg    MCHC 34.2 31.5 - 36.5 g/dL    RDW 13.2 10.0 - 15.0 %    Platelet Count 250 150 - 450 10e3/uL   Hemoglobin A1c     Status: Abnormal   Result Value Ref Range    Hemoglobin A1C 7.6 (H) 4.0 - 6.2 %   UA reflex to Microscopic     Status: Normal   Result Value Ref Range    Color Urine Yellow Colorless, Straw, Light Yellow, Yellow    Appearance Urine Clear Clear    Glucose Urine Negative Negative mg/dL    Bilirubin Urine Negative Negative    Ketones Urine Negative Negative mg/dL    Specific Gravity Urine 1.007 1.000 - 1.030    Blood Urine Negative Negative    pH Urine 6.5 5.0 - 9.0    Protein Albumin Urine Negative Negative mg/dL    Urobilinogen Urine Normal Normal, 2.0 mg/dL    Nitrite Urine Negative Negative    Leukocyte Esterase Urine Negative Negative    Narrative    Microscopic not indicated      Aspects of Diabetes:   Recent Labs   Lab Test 11/01/22  0649 07/20/22  0653 03/18/22  0658   A1C 7.6* 7.2* 7.9*   LDL 61 61 70   HDL 42 40 43   TRIG 68 78 58   ALT 13 16 16   CR 0.96 0.96 1.01   GFRESTIMATED 87 87 82   POTASSIUM 4.5 4.1 4.4   TSH  --  5.27* 5.40*   T4  --  0.83 1.01   WBC 7.5 8.2 6.6   HGB 15.5 15.5 15.5    236 223   ALBUMIN 4.4 4.4 4.4      Hemoglobin A1c  Goal range is under 8%. Best is 6.5 to 7   Blood Pressure 136/76 Goal to keep less than 140/90   Tobacco  reports that he has never smoked. He has never used smokeless tobacco. Goal to abstain from tobacco   Aspirin or Plavix Anti-platelet therapy Aspirin or Plavix reduces risk of heart disease and stroke  --  sometimes used with other blood thinners, depending on bleeding risk and risk factors.    ACE/ARB Specific blood pressure meds These medications can reduce risk of kidney disease   Cholesterol Statins (Lipitor, Crestor, vs others) Statins reduce risk of heart disease and stroke   Eye Exam -- Do Yearly -- Annual diabetic eye exam   Healthy weight Wt Readings from Last 4 Encounters:   11/01/22 87.7 kg (193 lb 6.4 oz)   07/20/22 88.1 kg (194 lb 3.2 oz)   03/18/22 89.4 kg (197 lb)   08/04/21 89.4 kg (197 lb 3.2 oz)      Body mass index is 28.15 kg/m .  Goal BMI under 30, best is under 25.      -- Trying to exercise daily (goal at least 20 min/day) with moderate aerobic activity   -- Eat healthy (resources from ADA at http://www.diabetes.org/)   -- Taking good care of my feet. Consider seeing the Podiatrist   -- Check blood sugars as directed, record in log book and bring to every appointment    Insurance companies are grading you and I on your blood sugar control -- Goal is to get your A1c down to 7.9% or lower and NO Smoking!  -- Medicare and most insurance companies, will only cover Hemoglobin A1c labs to be rechecked every 91+ days.      Return for Diabetes labs and clinic follow-up appointment every 3 to 4 months.    Schedule lab only appointment --- A few days AFTER: 1/30/23   Schedule clinic appointment with Dr. Wilson -- Same day as labs, or 1-2 days later.

## 2022-11-01 NOTE — NURSING NOTE
"Chief Complaint   Patient presents with     Diabetes         Initial /76 (BP Location: Right arm, Patient Position: Sitting, Cuff Size: Adult Large)   Pulse 89   Temp (!) 96.7  F (35.9  C) (Temporal)   Resp 18   Ht 1.765 m (5' 9.5\")   Wt 87.7 kg (193 lb 6.4 oz)   SpO2 98%   BMI 28.15 kg/m   Estimated body mass index is 28.15 kg/m  as calculated from the following:    Height as of this encounter: 1.765 m (5' 9.5\").    Weight as of this encounter: 87.7 kg (193 lb 6.4 oz).       FOOD SECURITY SCREENING QUESTIONS:    The next two questions are to help us understand your food security.  If you are feeling you need any assistance in this area, we have resources available to support you today.    Hunger Vital Signs:  Within the past 12 months we worried whether our food would run out before we got money to buy more. Never  Within the past 12 months the food we bought just didn't last and we didn't have money to get more. Never    Advance Care Directive on file? no      Medication reconciliation complete.      Primo Romero,on 11/1/2022 at 7:52 AM        "

## 2022-11-01 NOTE — PROGRESS NOTES
Assessment & Plan   Oh Delarosa is a 67 year old, presenting for the following health issues:      ICD-10-CM    1. Benign essential hypertension  I10 losartan (COZAAR) 50 MG tablet      2. Mixed hyperlipidemia  E78.2 Lipid Profile      3. CKD (chronic kidney disease) stage 2, GFR 60-89 ml/min  N18.2       4. Controlled type 2 diabetes mellitus with complication, with long-term current use of insulin (H)  E11.8 glipiZIDE (GLUCOTROL) 10 MG tablet    Z79.4 insulin glargine (LANTUS SOLOSTAR) 100 UNIT/ML pen     metFORMIN (GLUCOPHAGE) 1000 MG tablet     insulin pen needle (ULTIGUARD SAFEPACK PEN NEEDLE) 32G X 4 MM miscellaneous     UA reflex to Microscopic     TSH with free T4 reflex     Hemoglobin A1c     CBC with platelets     Albumin Random Urine Quantitative with Creat Ratio     Comprehensive metabolic panel      Hypertension, blood pressure is currently well controlled.  Doing well with losartan.  Needs refills.  Creatinine is at baseline.    Mixed hyperlipidemia.  Cholesterol levels are at goal.  Doing well with current medications.  No changes for now.    Stage II chronic kidney disease.  Kidney function has been slowly declining.  Encouraged NSAID avoidance.    Type 2 diabetes with complication.  Has stage II chronic kidney disease.  Blood sugars have been creeping up.  Hemoglobin A1c is up but still controlled.  Suspect postprandial hyperglycemia.  Encourage reduce oral carbohydrate intake.  Regular exercise.  He continues with metformin, Lantus.  We will increase his glipizide up to a total of 40 mg daily.  He plans to take an extra half tablet or full tablet, 5 to 10 mg, with supper.  That is generally his largest meal of the day.  Standing orders updated.    Encouraged weight loss and regular exercise.     Return in about 3 months (around 2/1/2023) for - Labs every 91+ days, with DM Follow-up, Same Day or 1-2 days later with Dr. Wilson.    Moshe Wilson MD  [facility]     Subjective    Diabetes    History of Present Illness       Diabetes:   He presents for follow up of diabetes.  He is checking home blood glucose four or more times daily. He checks blood glucose before and after meals and at bedtime.  Blood glucose is never over 200 and sometimes under 70. He is aware of hypoglycemia symptoms including shakiness, weakness and other. He has no concerns regarding his diabetes at this time.  He is having numbness in feet. The patient has not had a diabetic eye exam in the last 12 months.         He eats 2-3 servings of fruits and vegetables daily.He consumes 1 sweetened beverage(s) daily.He exercises with enough effort to increase his heart rate 30 to 60 minutes per day.  He exercises with enough effort to increase his heart rate 6 days per week.   He is taking medications regularly.     Review of Systems   Constitutional: Negative for chills, fatigue and fever.   HENT: Negative for congestion and hearing loss.    Eyes: Negative for pain and visual disturbance.   Respiratory: Negative for cough, shortness of breath and wheezing.    Cardiovascular: Negative for chest pain and palpitations.   Gastrointestinal: Negative for abdominal pain, diarrhea, nausea and vomiting.   Endocrine: Negative for cold intolerance and heat intolerance.   Genitourinary: Negative for dysuria and hematuria.   Musculoskeletal: Negative for arthralgias and myalgias.        Right 4th finger, in palm. starting to develop trigger finger, improved with regular home therapy   Skin: Negative for pallor.   Allergic/Immunologic: Negative for immunocompromised state.   Neurological: Positive for numbness (+ bilateral feet, comes and goes -- better with better glucose control). Negative for dizziness and light-headedness.   Hematological: Does not bruise/bleed easily.   Psychiatric/Behavioral: Negative for agitation and confusion.          Objective    /76 (BP Location: Right arm, Patient Position: Sitting, Cuff Size: Adult  "Large)   Pulse 89   Temp (!) 96.7  F (35.9  C) (Temporal)   Resp 18   Ht 1.765 m (5' 9.5\")   Wt 87.7 kg (193 lb 6.4 oz)   SpO2 98%   BMI 28.15 kg/m    Body mass index is 28.15 kg/m .  Physical Exam  Constitutional:       General: He is not in acute distress.     Appearance: He is well-developed. He is not diaphoretic.   HENT:      Head: Normocephalic and atraumatic.   Eyes:      General: No scleral icterus.     Conjunctiva/sclera: Conjunctivae normal.   Neck:      Vascular: No carotid bruit.   Cardiovascular:      Rate and Rhythm: Normal rate and regular rhythm.      Pulses: Normal pulses.   Pulmonary:      Effort: Pulmonary effort is normal.      Breath sounds: Normal breath sounds.   Abdominal:      Palpations: Abdomen is soft.      Tenderness: There is no abdominal tenderness.   Musculoskeletal:         General: No tenderness or deformity.      Cervical back: Neck supple.      Right lower leg: No edema.      Left lower leg: No edema.   Lymphadenopathy:      Cervical: No cervical adenopathy.   Skin:     General: Skin is warm and dry.      Findings: No rash.   Neurological:      Mental Status: He is alert and oriented to person, place, and time. Mental status is at baseline.   Psychiatric:         Mood and Affect: Mood normal.         Behavior: Behavior normal.        Recent Labs   Lab Test 11/01/22  0649 07/20/22  0653 03/18/22  0658   A1C 7.6* 7.2* 7.9*   LDL 61 61 70   HDL 42 40 43   TRIG 68 78 58   ALT 13 16 16   CR 0.96 0.96 1.01   GFRESTIMATED 87 87 82   POTASSIUM 4.5 4.1 4.4   TSH 5.09* 5.27* 5.40*   T4 0.90 0.83 1.01   WBC 7.5 8.2 6.6   HGB 15.5 15.5 15.5    236 223   ALBUMIN 4.4 4.4 4.4     Hemoglobin A1c is controlled.  LDL HDL and triglycerides are at goal.  ALT is normal.  Creatinine is at baseline.  Potassium is normal.  TSH is high.  Free T4 is within normal range.  CBC is normal.  Albumin is normal.            "

## 2023-02-10 ENCOUNTER — OFFICE VISIT (OUTPATIENT)
Dept: INTERNAL MEDICINE | Facility: OTHER | Age: 68
End: 2023-02-10
Attending: INTERNAL MEDICINE
Payer: MEDICARE

## 2023-02-10 ENCOUNTER — LAB (OUTPATIENT)
Dept: LAB | Facility: OTHER | Age: 68
End: 2023-02-10
Attending: INTERNAL MEDICINE
Payer: MEDICARE

## 2023-02-10 VITALS
HEIGHT: 70 IN | BODY MASS INDEX: 27.49 KG/M2 | SYSTOLIC BLOOD PRESSURE: 130 MMHG | HEART RATE: 90 BPM | WEIGHT: 192 LBS | OXYGEN SATURATION: 99 % | TEMPERATURE: 97.1 F | DIASTOLIC BLOOD PRESSURE: 72 MMHG | RESPIRATION RATE: 16 BRPM

## 2023-02-10 DIAGNOSIS — G60.9 HEREDITARY AND IDIOPATHIC PERIPHERAL NEUROPATHY: ICD-10-CM

## 2023-02-10 DIAGNOSIS — N18.2 CKD (CHRONIC KIDNEY DISEASE) STAGE 2, GFR 60-89 ML/MIN: ICD-10-CM

## 2023-02-10 DIAGNOSIS — I10 BENIGN ESSENTIAL HYPERTENSION: ICD-10-CM

## 2023-02-10 DIAGNOSIS — E89.2 POSTPROCEDURAL HYPOPARATHYROIDISM (H): ICD-10-CM

## 2023-02-10 DIAGNOSIS — E11.8 CONTROLLED TYPE 2 DIABETES MELLITUS WITH COMPLICATION, WITH LONG-TERM CURRENT USE OF INSULIN (H): Primary | ICD-10-CM

## 2023-02-10 DIAGNOSIS — E78.2 MIXED HYPERLIPIDEMIA: ICD-10-CM

## 2023-02-10 DIAGNOSIS — E11.8 CONTROLLED TYPE 2 DIABETES MELLITUS WITH COMPLICATION, WITH LONG-TERM CURRENT USE OF INSULIN (H): ICD-10-CM

## 2023-02-10 DIAGNOSIS — Z79.4 CONTROLLED TYPE 2 DIABETES MELLITUS WITH COMPLICATION, WITH LONG-TERM CURRENT USE OF INSULIN (H): Primary | ICD-10-CM

## 2023-02-10 DIAGNOSIS — Z79.4 CONTROLLED TYPE 2 DIABETES MELLITUS WITH COMPLICATION, WITH LONG-TERM CURRENT USE OF INSULIN (H): ICD-10-CM

## 2023-02-10 DIAGNOSIS — E55.9 VITAMIN D DEFICIENCY: ICD-10-CM

## 2023-02-10 LAB
ALBUMIN SERPL BCG-MCNC: 4.5 G/DL (ref 3.5–5.2)
ALBUMIN UR-MCNC: NEGATIVE MG/DL
ALP SERPL-CCNC: 90 U/L (ref 40–129)
ALT SERPL W P-5'-P-CCNC: 23 U/L (ref 10–50)
ANION GAP SERPL CALCULATED.3IONS-SCNC: 8 MMOL/L (ref 7–15)
APPEARANCE UR: CLEAR
AST SERPL W P-5'-P-CCNC: 24 U/L (ref 10–50)
BILIRUB SERPL-MCNC: 0.5 MG/DL
BILIRUB UR QL STRIP: NEGATIVE
BUN SERPL-MCNC: 12.8 MG/DL (ref 8–23)
CALCIUM SERPL-MCNC: 9.6 MG/DL (ref 8.8–10.2)
CHLORIDE SERPL-SCNC: 100 MMOL/L (ref 98–107)
CHOLEST SERPL-MCNC: 132 MG/DL
COLOR UR AUTO: YELLOW
CREAT SERPL-MCNC: 0.93 MG/DL (ref 0.67–1.17)
CREAT UR-MCNC: 28 MG/DL
DEPRECATED HCO3 PLAS-SCNC: 29 MMOL/L (ref 22–29)
ERYTHROCYTE [DISTWIDTH] IN BLOOD BY AUTOMATED COUNT: 13.3 % (ref 10–15)
GFR SERPL CREATININE-BSD FRML MDRD: 89 ML/MIN/1.73M2
GLUCOSE SERPL-MCNC: 104 MG/DL (ref 70–99)
GLUCOSE UR STRIP-MCNC: NEGATIVE MG/DL
HBA1C MFR BLD: 7.5 % (ref 4–6.2)
HCT VFR BLD AUTO: 46.4 % (ref 40–53)
HDLC SERPL-MCNC: 51 MG/DL
HGB BLD-MCNC: 15.8 G/DL (ref 13.3–17.7)
HGB UR QL STRIP: NEGATIVE
KETONES UR STRIP-MCNC: NEGATIVE MG/DL
LDLC SERPL CALC-MCNC: 68 MG/DL
LEUKOCYTE ESTERASE UR QL STRIP: NEGATIVE
MCH RBC QN AUTO: 29.6 PG (ref 26.5–33)
MCHC RBC AUTO-ENTMCNC: 34.1 G/DL (ref 31.5–36.5)
MCV RBC AUTO: 87 FL (ref 78–100)
MICROALBUMIN UR-MCNC: <12 MG/L
MICROALBUMIN/CREAT UR: NORMAL MG/G{CREAT}
NITRATE UR QL: NEGATIVE
NONHDLC SERPL-MCNC: 81 MG/DL
PH UR STRIP: 7.5 [PH] (ref 5–9)
PLATELET # BLD AUTO: 233 10E3/UL (ref 150–450)
POTASSIUM SERPL-SCNC: 4.4 MMOL/L (ref 3.4–5.3)
PROT SERPL-MCNC: 7.5 G/DL (ref 6.4–8.3)
RBC # BLD AUTO: 5.33 10E6/UL (ref 4.4–5.9)
SODIUM SERPL-SCNC: 137 MMOL/L (ref 136–145)
SP GR UR STRIP: 1.01 (ref 1–1.03)
T4 FREE SERPL-MCNC: 1.16 NG/DL (ref 0.9–1.7)
TRIGL SERPL-MCNC: 66 MG/DL
TSH SERPL DL<=0.005 MIU/L-ACNC: 6.35 UIU/ML (ref 0.3–4.2)
UROBILINOGEN UR STRIP-MCNC: NORMAL MG/DL
WBC # BLD AUTO: 8.1 10E3/UL (ref 4–11)

## 2023-02-10 PROCEDURE — 80061 LIPID PANEL: CPT | Mod: ZL

## 2023-02-10 PROCEDURE — G0463 HOSPITAL OUTPT CLINIC VISIT: HCPCS

## 2023-02-10 PROCEDURE — 36415 COLL VENOUS BLD VENIPUNCTURE: CPT | Mod: ZL

## 2023-02-10 PROCEDURE — 99214 OFFICE O/P EST MOD 30 MIN: CPT | Performed by: INTERNAL MEDICINE

## 2023-02-10 PROCEDURE — 84443 ASSAY THYROID STIM HORMONE: CPT | Mod: ZL

## 2023-02-10 PROCEDURE — 80053 COMPREHEN METABOLIC PANEL: CPT | Mod: ZL

## 2023-02-10 PROCEDURE — 81003 URINALYSIS AUTO W/O SCOPE: CPT | Mod: ZL

## 2023-02-10 PROCEDURE — 83036 HEMOGLOBIN GLYCOSYLATED A1C: CPT | Mod: ZL

## 2023-02-10 PROCEDURE — 85014 HEMATOCRIT: CPT | Mod: ZL

## 2023-02-10 PROCEDURE — 84439 ASSAY OF FREE THYROXINE: CPT | Mod: ZL

## 2023-02-10 PROCEDURE — 82570 ASSAY OF URINE CREATININE: CPT | Mod: ZL

## 2023-02-10 RX ORDER — INSULIN GLARGINE 100 [IU]/ML
INJECTION, SOLUTION SUBCUTANEOUS
Qty: 15 ML | Refills: 1 | Status: SHIPPED | OUTPATIENT
Start: 2023-02-10 | End: 2023-05-31

## 2023-02-10 RX ORDER — ACETAMINOPHEN 160 MG
100 TABLET,DISINTEGRATING ORAL DAILY
Qty: 180 CAPSULE | Refills: 4 | Status: SHIPPED | OUTPATIENT
Start: 2023-02-10

## 2023-02-10 RX ORDER — LOSARTAN POTASSIUM 50 MG/1
50 TABLET ORAL DAILY
Qty: 90 TABLET | Refills: 1 | Status: SHIPPED | OUTPATIENT
Start: 2023-02-10 | End: 2023-05-31

## 2023-02-10 RX ORDER — ATORVASTATIN CALCIUM 40 MG/1
40 TABLET, FILM COATED ORAL DAILY
Qty: 90 TABLET | Refills: 4 | Status: SHIPPED | OUTPATIENT
Start: 2023-02-10 | End: 2023-09-07

## 2023-02-10 ASSESSMENT — PAIN SCALES - GENERAL: PAINLEVEL: SEVERE PAIN (6)

## 2023-02-10 ASSESSMENT — ENCOUNTER SYMPTOMS
FATIGUE: 0
MYALGIAS: 0
NUMBNESS: 1
ABDOMINAL PAIN: 0
BRUISES/BLEEDS EASILY: 0
LIGHT-HEADEDNESS: 0
CHILLS: 0
PALPITATIONS: 0
ARTHRALGIAS: 0
AGITATION: 0
NAUSEA: 0
VOMITING: 0
CONFUSION: 0
EYE PAIN: 0
WHEEZING: 0
DIARRHEA: 0
DIZZINESS: 0
DYSURIA: 0
FEVER: 0
HEMATURIA: 0
COUGH: 0
SHORTNESS OF BREATH: 0

## 2023-02-10 NOTE — PATIENT INSTRUCTIONS
Blood pressure is well controlled.   Diabetes is well controlled.     Medications refilled.   Labs are stable.     Results for orders placed or performed in visit on 02/10/23   Comprehensive metabolic panel     Status: Abnormal   Result Value Ref Range    Sodium 137 136 - 145 mmol/L    Potassium 4.4 3.4 - 5.3 mmol/L    Chloride 100 98 - 107 mmol/L    Carbon Dioxide (CO2) 29 22 - 29 mmol/L    Anion Gap 8 7 - 15 mmol/L    Urea Nitrogen 12.8 8.0 - 23.0 mg/dL    Creatinine 0.93 0.67 - 1.17 mg/dL    Calcium 9.6 8.8 - 10.2 mg/dL    Glucose 104 (H) 70 - 99 mg/dL    Alkaline Phosphatase 90 40 - 129 U/L    AST 24 10 - 50 U/L    ALT 23 10 - 50 U/L    Protein Total 7.5 6.4 - 8.3 g/dL    Albumin 4.5 3.5 - 5.2 g/dL    Bilirubin Total 0.5 <=1.2 mg/dL    GFR Estimate 89 >60 mL/min/1.73m2   Lipid Profile     Status: Normal   Result Value Ref Range    Cholesterol 132 <200 mg/dL    Triglycerides 66 <150 mg/dL    Direct Measure HDL 51 >=40 mg/dL    LDL Cholesterol Calculated 68 <=100 mg/dL    Non HDL Cholesterol 81 <130 mg/dL    Narrative    Cholesterol  Desirable:  <200 mg/dL    Triglycerides  Normal:  Less than 150 mg/dL  Borderline High:  150-199 mg/dL  High:  200-499 mg/dL  Very High:  Greater than or equal to 500 mg/dL    Direct Measure HDL  Female:  Greater than or equal to 50 mg/dL   Male:  Greater than or equal to 40 mg/dL    LDL Cholesterol  Desirable:  <100mg/dL  Above Desirable:  100-129 mg/dL   Borderline High:  130-159 mg/dL   High:  160-189 mg/dL   Very High:  >= 190 mg/dL    Non HDL Cholesterol  Desirable:  130 mg/dL  Above Desirable:  130-159 mg/dL  Borderline High:  160-189 mg/dL  High:  190-219 mg/dL  Very High:  Greater than or equal to 220 mg/dL   CBC with platelets     Status: Normal   Result Value Ref Range    WBC Count 8.1 4.0 - 11.0 10e3/uL    RBC Count 5.33 4.40 - 5.90 10e6/uL    Hemoglobin 15.8 13.3 - 17.7 g/dL    Hematocrit 46.4 40.0 - 53.0 %    MCV 87 78 - 100 fL    MCH 29.6 26.5 - 33.0 pg    MCHC 34.1 31.5  - 36.5 g/dL    RDW 13.3 10.0 - 15.0 %    Platelet Count 233 150 - 450 10e3/uL   Hemoglobin A1c     Status: Abnormal   Result Value Ref Range    Hemoglobin A1C 7.5 (H) 4.0 - 6.2 %   TSH with free T4 reflex     Status: Abnormal   Result Value Ref Range    TSH 6.35 (H) 0.30 - 4.20 uIU/mL   Albumin Random Urine Quantitative with Creat Ratio     Status: None   Result Value Ref Range    Creatinine Urine mg/dL 28.0 mg/dL    Albumin Urine mg/L <12.0 mg/L    Albumin Urine mg/g Cr     UA reflex to Microscopic     Status: Normal   Result Value Ref Range    Color Urine Yellow Colorless, Straw, Light Yellow, Yellow    Appearance Urine Clear Clear    Glucose Urine Negative Negative mg/dL    Bilirubin Urine Negative Negative    Ketones Urine Negative Negative mg/dL    Specific Gravity Urine 1.006 1.000 - 1.030    Blood Urine Negative Negative    pH Urine 7.5 5.0 - 9.0    Protein Albumin Urine Negative Negative mg/dL    Urobilinogen Urine Normal Normal, 2.0 mg/dL    Nitrite Urine Negative Negative    Leukocyte Esterase Urine Negative Negative    Narrative    Microscopic not indicated   T4 free     Status: Normal   Result Value Ref Range    Free T4 1.16 0.90 - 1.70 ng/dL      Aspects of Diabetes:   Recent Labs   Lab Test 02/10/23  0653 11/01/22  0649 07/20/22  0653   A1C 7.5* 7.6* 7.2*   LDL 68 61 61   HDL 51 42 40   TRIG 66 68 78   ALT 23 13 16   CR 0.93 0.96 0.96   GFRESTIMATED 89 87 87   POTASSIUM 4.4 4.5 4.1   TSH 6.35* 5.09* 5.27*   T4 1.16 0.90 0.83   WBC 8.1 7.5 8.2   HGB 15.8 15.5 15.5    250 236   ALBUMIN 4.5 4.4 4.4      Hemoglobin A1c  Goal range is under 8%. Best is 6.5 to 7   Blood Pressure 130/72 Goal to keep less than 140/90   Tobacco  reports that he has never smoked. He has never used smokeless tobacco. Goal to abstain from tobacco   Aspirin or Plavix Anti-platelet therapy Aspirin or Plavix reduces risk of heart disease and stroke  -- sometimes used with other blood thinners, depending on bleeding risk and  risk factors.    ACE/ARB Specific blood pressure meds These medications can reduce risk of kidney disease   Cholesterol Statins (Lipitor, Crestor, vs others) Statins reduce risk of heart disease and stroke   Eye Exam -- Do Yearly -- Annual diabetic eye exam   Healthy weight Wt Readings from Last 4 Encounters:   02/10/23 87.1 kg (192 lb)   11/01/22 87.7 kg (193 lb 6.4 oz)   07/20/22 88.1 kg (194 lb 3.2 oz)   03/18/22 89.4 kg (197 lb)      Body mass index is 27.75 kg/m .  Goal BMI under 30, best is under 25.      -- Trying to exercise daily (goal at least 20 min/day) with moderate aerobic activity   -- Eat healthy (resources from ADA at http://www.diabetes.org/)   -- Taking good care of my feet. Consider seeing the Podiatrist   -- Check blood sugars as directed, record in log book and bring to every appointment    Insurance companies are grading you and I on your blood sugar control -- Goal is to get your A1c down to 7.9% or lower and NO Smoking!  -- Medicare and most insurance companies, will only cover Hemoglobin A1c labs to be rechecked every 91+ days.      Return for Diabetes labs and clinic follow-up appointment every 3 to 4 months.    Schedule lab only appointment --- A few days AFTER: 5/11/23   Schedule clinic appointment with Dr. Wilson -- Same day as labs, or 1-2 days later.

## 2023-02-10 NOTE — NURSING NOTE
"Chief Complaint   Patient presents with     Diabetes         Initial /72 (BP Location: Right arm, Patient Position: Sitting, Cuff Size: Adult Regular)   Pulse 90   Temp 97.1  F (36.2  C) (Temporal)   Resp 16   Ht 1.772 m (5' 9.75\")   Wt 87.1 kg (192 lb)   SpO2 99%   BMI 27.75 kg/m   Estimated body mass index is 27.75 kg/m  as calculated from the following:    Height as of this encounter: 1.772 m (5' 9.75\").    Weight as of this encounter: 87.1 kg (192 lb).       FOOD SECURITY SCREENING QUESTIONS:    The next two questions are to help us understand your food security.  If you are feeling you need any assistance in this area, we have resources available to support you today.    Hunger Vital Signs:  Within the past 12 months we worried whether our food would run out before we got money to buy more. Never  Within the past 12 months the food we bought just didn't last and we didn't have money to get more. Never    Advance Care Directive on file? no      Medication reconciliation complete.      Primo Romero,on 2/10/2023 at 7:52 AM        "

## 2023-02-10 NOTE — PROGRESS NOTES
Assessment & Plan   Oh Delarosa is a 68 year old, presenting for the following health issues:      ICD-10-CM    1. Controlled type 2 diabetes mellitus with complication, with long-term current use of insulin (H)  E11.8 insulin glargine (LANTUS SOLOSTAR) 100 UNIT/ML pen    Z79.4 metFORMIN (GLUCOPHAGE) 1000 MG tablet      2. Postprocedural hypoparathyroidism (H)  E89.2       3. Hereditary and idiopathic peripheral neuropathy  G60.9       4. CKD (chronic kidney disease) stage 2, GFR 60-89 ml/min  N18.2       5. Mixed hyperlipidemia  E78.2 atorvastatin (LIPITOR) 40 MG tablet      6. Benign essential hypertension  I10 losartan (COZAAR) 50 MG tablet      7. Vitamin D deficiency  E55.9 Cholecalciferol (VITAMIN D3) 50 MCG (2000 UT) CAPS        Patient presents for diabetes follow-up as well as follow-up multiple issues.    Type 2 diabetes, has diabetic neuropathy.  0 out of 10 locations on his feet were noted with monofilament testing today.  Continues with Lantus, metformin.  Denies hypoglycemia.  Needs refills.    Postprocedural hypoparathyroidism.  Calcium levels are currently stable.    Elevated TSH.  Free T4 is within normal range.  Continue to monitor.    Peripheral neuropathy.  See above.  Checks his feet daily.  No foot sores.  Feet are not numb not painful.  Continue to monitor.  Recommend daily foot checks.    Stage II chronic kidney disease.  Kidney function has improved again slightly.  Encouraged NSAID avoidance.    Mixed hyperlipidemia.  Currently well controlled.  Cholesterol levels are all at goal.  Doing well with Lipitor.  Needs refills.    Hypertension, blood pressure is currently well controlled.  Doing well with current medication regimen.  Denies syncope or presyncope.  Continues on losartan.  Needs refills.    Vitamin D deficiency.  Ongoing.  Continues with oral replacement.  Needs refills.    Encouraged regular exercise.     Return in about 4 months (around 5/31/2023) for - Labs every 91+ days,  with DM Follow-up, Same Day or 1-2 days later with Dr. Wilson.    Moshe Wilson MD  [facility]     Subjective   Diabetes      History of Present Illness       Diabetes:   He presents for follow up of diabetes.  He is checking home blood glucose three times daily. He checks blood glucose before meals and before and after meals.  Blood glucose is never over 200 and sometimes under 70. He is aware of hypoglycemia symptoms including shakiness, weakness and blurred vision. He has no concerns regarding his diabetes at this time.  He is having numbness in feet, burning in feet and blurry vision. The patient has not had a diabetic eye exam in the last 12 months.         He eats 2-3 servings of fruits and vegetables daily.He consumes 0 sweetened beverage(s) daily.He exercises with enough effort to increase his heart rate 30 to 60 minutes per day.  He exercises with enough effort to increase his heart rate 7 days per week.   He is taking medications regularly.     Review of Systems   Constitutional: Negative for chills, fatigue and fever.   HENT: Negative for congestion and hearing loss.    Eyes: Negative for pain and visual disturbance.   Respiratory: Negative for cough, shortness of breath and wheezing.    Cardiovascular: Negative for chest pain and palpitations.   Gastrointestinal: Negative for abdominal pain, diarrhea, nausea and vomiting.   Endocrine: Negative for cold intolerance and heat intolerance.   Genitourinary: Negative for dysuria and hematuria.   Musculoskeletal: Negative for arthralgias and myalgias.        Right 4th finger, in palm. starting to develop trigger finger, improved with regular home therapy   Skin: Negative for pallor.   Allergic/Immunologic: Negative for immunocompromised state.   Neurological: Positive for numbness (+ bilateral feet, comes and goes -- better with better glucose control). Negative for dizziness and light-headedness.   Hematological: Does not bruise/bleed  "easily.   Psychiatric/Behavioral: Negative for agitation and confusion.          Objective    /72 (BP Location: Right arm, Patient Position: Sitting, Cuff Size: Adult Regular)   Pulse 90   Temp 97.1  F (36.2  C) (Temporal)   Resp 16   Ht 1.772 m (5' 9.75\")   Wt 87.1 kg (192 lb)   SpO2 99%   BMI 27.75 kg/m    Body mass index is 27.75 kg/m .  Physical Exam  Constitutional:       General: He is not in acute distress.     Appearance: He is well-developed. He is not diaphoretic.   HENT:      Head: Normocephalic and atraumatic.   Eyes:      General: No scleral icterus.     Conjunctiva/sclera: Conjunctivae normal.   Neck:      Vascular: No carotid bruit.   Cardiovascular:      Rate and Rhythm: Normal rate and regular rhythm.      Pulses: Normal pulses.           Dorsalis pedis pulses are 2+ on the right side and 2+ on the left side.        Posterior tibial pulses are 2+ on the right side and 2+ on the left side.   Pulmonary:      Effort: Pulmonary effort is normal.      Breath sounds: Normal breath sounds.   Abdominal:      Palpations: Abdomen is soft.      Tenderness: There is no abdominal tenderness.   Musculoskeletal:         General: No tenderness.      Cervical back: Neck supple.      Right lower leg: No edema.      Left lower leg: No edema.      Right foot: Deformity (Flat footed) present.      Left foot: Deformity (Flat footed) present.   Feet:      Right foot:      Protective Sensation: 10 sites tested. 0 sites sensed.      Skin integrity: Skin integrity normal.      Toenail Condition: Right toenails are long.      Left foot:      Protective Sensation: 10 sites tested. 0 sites sensed.      Skin integrity: Skin integrity normal.      Toenail Condition: Left toenails are long.   Lymphadenopathy:      Cervical: No cervical adenopathy.   Skin:     General: Skin is warm and dry.      Findings: No rash.   Neurological:      Mental Status: He is alert and oriented to person, place, and time. Mental status is " at baseline.   Psychiatric:         Mood and Affect: Mood normal.         Behavior: Behavior normal.          Recent Labs   Lab Test 02/10/23  0653 11/01/22  0649 07/20/22  0653   A1C 7.5* 7.6* 7.2*   LDL 68 61 61   HDL 51 42 40   TRIG 66 68 78   ALT 23 13 16   CR 0.93 0.96 0.96   GFRESTIMATED 89 87 87   POTASSIUM 4.4 4.5 4.1   TSH 6.35* 5.09* 5.27*   T4 1.16 0.90 0.83   WBC 8.1 7.5 8.2   HGB 15.8 15.5 15.5    250 236   ALBUMIN 4.5 4.4 4.4     Hemoglobin A1c is well controlled.  LDL HDL and triglycerides are at goal cardiology is normal.  Creatinine has improved.  Potassium normal.  TSH is slightly elevated.  Free T4 normal.  CBC normal.  Albumin normal.

## 2023-04-01 ENCOUNTER — TRANSFERRED RECORDS (OUTPATIENT)
Dept: MULTI SPECIALTY CLINIC | Facility: CLINIC | Age: 68
End: 2023-04-01

## 2023-04-01 LAB — RETINOPATHY: NORMAL

## 2023-05-31 ENCOUNTER — OFFICE VISIT (OUTPATIENT)
Dept: INTERNAL MEDICINE | Facility: OTHER | Age: 68
End: 2023-05-31
Attending: INTERNAL MEDICINE
Payer: MEDICARE

## 2023-05-31 ENCOUNTER — LAB (OUTPATIENT)
Dept: LAB | Facility: OTHER | Age: 68
End: 2023-05-31
Attending: INTERNAL MEDICINE
Payer: COMMERCIAL

## 2023-05-31 VITALS
WEIGHT: 192.6 LBS | DIASTOLIC BLOOD PRESSURE: 74 MMHG | TEMPERATURE: 97 F | RESPIRATION RATE: 20 BRPM | OXYGEN SATURATION: 97 % | SYSTOLIC BLOOD PRESSURE: 138 MMHG | HEIGHT: 69 IN | HEART RATE: 93 BPM | BODY MASS INDEX: 28.53 KG/M2

## 2023-05-31 DIAGNOSIS — Z00.00 ENCOUNTER FOR MEDICARE ANNUAL WELLNESS EXAM: ICD-10-CM

## 2023-05-31 DIAGNOSIS — I10 BENIGN ESSENTIAL HYPERTENSION: ICD-10-CM

## 2023-05-31 DIAGNOSIS — N18.2 CKD (CHRONIC KIDNEY DISEASE) STAGE 2, GFR 60-89 ML/MIN: ICD-10-CM

## 2023-05-31 DIAGNOSIS — E78.2 MIXED HYPERLIPIDEMIA: ICD-10-CM

## 2023-05-31 DIAGNOSIS — E11.8 CONTROLLED TYPE 2 DIABETES MELLITUS WITH COMPLICATION, WITH LONG-TERM CURRENT USE OF INSULIN (H): ICD-10-CM

## 2023-05-31 DIAGNOSIS — E55.9 VITAMIN D DEFICIENCY: ICD-10-CM

## 2023-05-31 DIAGNOSIS — Z71.85 VACCINE COUNSELING: ICD-10-CM

## 2023-05-31 DIAGNOSIS — Z79.4 CONTROLLED TYPE 2 DIABETES MELLITUS WITH COMPLICATION, WITH LONG-TERM CURRENT USE OF INSULIN (H): ICD-10-CM

## 2023-05-31 DIAGNOSIS — Z12.5 ENCOUNTER FOR SCREENING FOR MALIGNANT NEOPLASM OF PROSTATE: ICD-10-CM

## 2023-05-31 DIAGNOSIS — E11.8 CONTROLLED TYPE 2 DIABETES MELLITUS WITH COMPLICATION, WITH LONG-TERM CURRENT USE OF INSULIN (H): Primary | ICD-10-CM

## 2023-05-31 DIAGNOSIS — E11.3292 MILD NONPROLIFERATIVE DIABETIC RETINOPATHY OF LEFT EYE WITHOUT MACULAR EDEMA ASSOCIATED WITH TYPE 2 DIABETES MELLITUS (H): ICD-10-CM

## 2023-05-31 DIAGNOSIS — Z79.4 CONTROLLED TYPE 2 DIABETES MELLITUS WITH COMPLICATION, WITH LONG-TERM CURRENT USE OF INSULIN (H): Primary | ICD-10-CM

## 2023-05-31 LAB
ALBUMIN SERPL BCG-MCNC: 4.4 G/DL (ref 3.5–5.2)
ALBUMIN UR-MCNC: NEGATIVE MG/DL
ALP SERPL-CCNC: 89 U/L (ref 40–129)
ALT SERPL W P-5'-P-CCNC: 24 U/L (ref 10–50)
ANION GAP SERPL CALCULATED.3IONS-SCNC: 9 MMOL/L (ref 7–15)
APPEARANCE UR: CLEAR
AST SERPL W P-5'-P-CCNC: 26 U/L (ref 10–50)
BILIRUB SERPL-MCNC: 0.4 MG/DL
BILIRUB UR QL STRIP: NEGATIVE
BUN SERPL-MCNC: 14.4 MG/DL (ref 8–23)
CALCIUM SERPL-MCNC: 9.8 MG/DL (ref 8.8–10.2)
CHLORIDE SERPL-SCNC: 101 MMOL/L (ref 98–107)
CHOLEST SERPL-MCNC: 129 MG/DL
COLOR UR AUTO: YELLOW
CREAT SERPL-MCNC: 0.9 MG/DL (ref 0.67–1.17)
CREAT UR-MCNC: 30.8 MG/DL
DEPRECATED HCO3 PLAS-SCNC: 29 MMOL/L (ref 22–29)
ERYTHROCYTE [DISTWIDTH] IN BLOOD BY AUTOMATED COUNT: 13.6 % (ref 10–15)
GFR SERPL CREATININE-BSD FRML MDRD: >90 ML/MIN/1.73M2
GLUCOSE SERPL-MCNC: 92 MG/DL (ref 70–99)
GLUCOSE UR STRIP-MCNC: NEGATIVE MG/DL
HBA1C MFR BLD: 7.3 % (ref 4–6.2)
HCT VFR BLD AUTO: 46.1 % (ref 40–53)
HDLC SERPL-MCNC: 45 MG/DL
HGB BLD-MCNC: 15.7 G/DL (ref 13.3–17.7)
HGB UR QL STRIP: NEGATIVE
KETONES UR STRIP-MCNC: NEGATIVE MG/DL
LDLC SERPL CALC-MCNC: 72 MG/DL
LEUKOCYTE ESTERASE UR QL STRIP: NEGATIVE
MCH RBC QN AUTO: 29.7 PG (ref 26.5–33)
MCHC RBC AUTO-ENTMCNC: 34.1 G/DL (ref 31.5–36.5)
MCV RBC AUTO: 87 FL (ref 78–100)
MICROALBUMIN UR-MCNC: <12 MG/L
MICROALBUMIN/CREAT UR: NORMAL MG/G{CREAT}
NITRATE UR QL: NEGATIVE
NONHDLC SERPL-MCNC: 84 MG/DL
PH UR STRIP: 8 [PH] (ref 5–9)
PLATELET # BLD AUTO: 232 10E3/UL (ref 150–450)
POTASSIUM SERPL-SCNC: 4.4 MMOL/L (ref 3.4–5.3)
PROT SERPL-MCNC: 7.3 G/DL (ref 6.4–8.3)
PSA SERPL DL<=0.01 NG/ML-MCNC: 1.49 NG/ML (ref 0–4.5)
RBC # BLD AUTO: 5.28 10E6/UL (ref 4.4–5.9)
SODIUM SERPL-SCNC: 139 MMOL/L (ref 136–145)
SP GR UR STRIP: 1.01 (ref 1–1.03)
T4 FREE SERPL-MCNC: 1.13 NG/DL (ref 0.9–1.7)
TRIGL SERPL-MCNC: 62 MG/DL
TSH SERPL DL<=0.005 MIU/L-ACNC: 5.77 UIU/ML (ref 0.3–4.2)
UROBILINOGEN UR STRIP-MCNC: NORMAL MG/DL
WBC # BLD AUTO: 8.8 10E3/UL (ref 4–11)

## 2023-05-31 PROCEDURE — 82570 ASSAY OF URINE CREATININE: CPT | Mod: ZL

## 2023-05-31 PROCEDURE — 84443 ASSAY THYROID STIM HORMONE: CPT | Mod: ZL

## 2023-05-31 PROCEDURE — 36415 COLL VENOUS BLD VENIPUNCTURE: CPT | Mod: ZL

## 2023-05-31 PROCEDURE — G0463 HOSPITAL OUTPT CLINIC VISIT: HCPCS

## 2023-05-31 PROCEDURE — 83036 HEMOGLOBIN GLYCOSYLATED A1C: CPT | Mod: ZL

## 2023-05-31 PROCEDURE — G0103 PSA SCREENING: HCPCS | Mod: ZL

## 2023-05-31 PROCEDURE — 81003 URINALYSIS AUTO W/O SCOPE: CPT | Mod: ZL

## 2023-05-31 PROCEDURE — G0439 PPPS, SUBSEQ VISIT: HCPCS | Performed by: INTERNAL MEDICINE

## 2023-05-31 PROCEDURE — 84439 ASSAY OF FREE THYROXINE: CPT | Mod: ZL

## 2023-05-31 PROCEDURE — 85027 COMPLETE CBC AUTOMATED: CPT | Mod: ZL

## 2023-05-31 PROCEDURE — 80053 COMPREHEN METABOLIC PANEL: CPT | Mod: ZL

## 2023-05-31 PROCEDURE — 99214 OFFICE O/P EST MOD 30 MIN: CPT | Mod: 25 | Performed by: INTERNAL MEDICINE

## 2023-05-31 PROCEDURE — 80061 LIPID PANEL: CPT | Mod: ZL

## 2023-05-31 RX ORDER — LOSARTAN POTASSIUM 50 MG/1
50 TABLET ORAL DAILY
Qty: 90 TABLET | Refills: 1 | Status: SHIPPED | OUTPATIENT
Start: 2023-05-31 | End: 2023-09-07

## 2023-05-31 RX ORDER — INSULIN GLARGINE 100 [IU]/ML
INJECTION, SOLUTION SUBCUTANEOUS
Qty: 15 ML | Refills: 1 | Status: SHIPPED | OUTPATIENT
Start: 2023-05-31 | End: 2023-08-27

## 2023-05-31 ASSESSMENT — ENCOUNTER SYMPTOMS
SHORTNESS OF BREATH: 0
ABDOMINAL PAIN: 0
EYE PAIN: 0
DIARRHEA: 0
CHILLS: 0
NUMBNESS: 1
LIGHT-HEADEDNESS: 0
CONFUSION: 0
FATIGUE: 0
NAUSEA: 0
ARTHRALGIAS: 0
MYALGIAS: 0
WHEEZING: 0
HEMATURIA: 0
DYSURIA: 0
BRUISES/BLEEDS EASILY: 0
VOMITING: 0
AGITATION: 0
COUGH: 0
PALPITATIONS: 0
DIZZINESS: 0
FEVER: 0

## 2023-05-31 ASSESSMENT — ACTIVITIES OF DAILY LIVING (ADL): CURRENT_FUNCTION: NO ASSISTANCE NEEDED

## 2023-05-31 ASSESSMENT — PAIN SCALES - GENERAL: PAINLEVEL: SEVERE PAIN (6)

## 2023-05-31 NOTE — PATIENT INSTRUCTIONS
Blood pressure is well controlled.   Diabetes is well controlled.     Medications refilled.   Labs are stable.     Results for orders placed or performed in visit on 05/31/23   Comprehensive metabolic panel     Status: Normal   Result Value Ref Range    Sodium 139 136 - 145 mmol/L    Potassium 4.4 3.4 - 5.3 mmol/L    Chloride 101 98 - 107 mmol/L    Carbon Dioxide (CO2) 29 22 - 29 mmol/L    Anion Gap 9 7 - 15 mmol/L    Urea Nitrogen 14.4 8.0 - 23.0 mg/dL    Creatinine 0.90 0.67 - 1.17 mg/dL    Calcium 9.8 8.8 - 10.2 mg/dL    Glucose 92 70 - 99 mg/dL    Alkaline Phosphatase 89 40 - 129 U/L    AST 26 10 - 50 U/L    ALT 24 10 - 50 U/L    Protein Total 7.3 6.4 - 8.3 g/dL    Albumin 4.4 3.5 - 5.2 g/dL    Bilirubin Total 0.4 <=1.2 mg/dL    GFR Estimate >90 >60 mL/min/1.73m2   Lipid Profile     Status: Normal   Result Value Ref Range    Cholesterol 129 <200 mg/dL    Triglycerides 62 <150 mg/dL    Direct Measure HDL 45 >=40 mg/dL    LDL Cholesterol Calculated 72 <=100 mg/dL    Non HDL Cholesterol 84 <130 mg/dL    Narrative    Cholesterol  Desirable:  <200 mg/dL    Triglycerides  Normal:  Less than 150 mg/dL  Borderline High:  150-199 mg/dL  High:  200-499 mg/dL  Very High:  Greater than or equal to 500 mg/dL    Direct Measure HDL  Female:  Greater than or equal to 50 mg/dL   Male:  Greater than or equal to 40 mg/dL    LDL Cholesterol  Desirable:  <100mg/dL  Above Desirable:  100-129 mg/dL   Borderline High:  130-159 mg/dL   High:  160-189 mg/dL   Very High:  >= 190 mg/dL    Non HDL Cholesterol  Desirable:  130 mg/dL  Above Desirable:  130-159 mg/dL  Borderline High:  160-189 mg/dL  High:  190-219 mg/dL  Very High:  Greater than or equal to 220 mg/dL   Albumin Random Urine Quantitative with Creat Ratio     Status: None   Result Value Ref Range    Creatinine Urine mg/dL 30.8 mg/dL    Albumin Urine mg/L <12.0 mg/L    Albumin Urine mg/g Cr     CBC with platelets     Status: Normal   Result Value Ref Range    WBC Count 8.8 4.0 -  11.0 10e3/uL    RBC Count 5.28 4.40 - 5.90 10e6/uL    Hemoglobin 15.7 13.3 - 17.7 g/dL    Hematocrit 46.1 40.0 - 53.0 %    MCV 87 78 - 100 fL    MCH 29.7 26.5 - 33.0 pg    MCHC 34.1 31.5 - 36.5 g/dL    RDW 13.6 10.0 - 15.0 %    Platelet Count 232 150 - 450 10e3/uL   Hemoglobin A1c     Status: Abnormal   Result Value Ref Range    Hemoglobin A1C 7.3 (H) 4.0 - 6.2 %   TSH with free T4 reflex     Status: Abnormal   Result Value Ref Range    TSH 5.77 (H) 0.30 - 4.20 uIU/mL   UA reflex to Microscopic     Status: Normal   Result Value Ref Range    Color Urine Yellow Colorless, Straw, Light Yellow, Yellow    Appearance Urine Clear Clear    Glucose Urine Negative Negative mg/dL    Bilirubin Urine Negative Negative    Ketones Urine Negative Negative mg/dL    Specific Gravity Urine 1.007 1.000 - 1.030    Blood Urine Negative Negative    pH Urine 8.0 5.0 - 9.0    Protein Albumin Urine Negative Negative mg/dL    Urobilinogen Urine Normal Normal, 2.0 mg/dL    Nitrite Urine Negative Negative    Leukocyte Esterase Urine Negative Negative    Narrative    Microscopic not indicated   T4 free     Status: Normal   Result Value Ref Range    Free T4 1.13 0.90 - 1.70 ng/dL      Aspects of Diabetes:   Recent Labs   Lab Test 05/31/23  0649 02/10/23  0653 11/01/22  0649   A1C 7.3* 7.5* 7.6*   LDL 72 68 61   HDL 45 51 42   TRIG 62 66 68   ALT 24 23 13   CR 0.90 0.93 0.96   GFRESTIMATED >90 89 87   POTASSIUM 4.4 4.4 4.5   TSH 5.77* 6.35* 5.09*   T4 1.13 1.16 0.90   WBC 8.8 8.1 7.5   HGB 15.7 15.8 15.5    233 250   ALBUMIN 4.4 4.5 4.4      Hemoglobin A1c  Goal range is under 8%. Best is 6.5 to 7   Blood Pressure 138/74 Goal to keep less than 140/90   Tobacco  reports that he has never smoked. He has never used smokeless tobacco. Goal to abstain from tobacco   Aspirin or Plavix Anti-platelet therapy Aspirin or Plavix reduces risk of heart disease and stroke  -- sometimes used with other blood thinners, depending on bleeding risk and risk  factors.    ACE/ARB Specific blood pressure meds These medications can reduce risk of kidney disease   Cholesterol Statins (Lipitor, Crestor, vs others) Statins reduce risk of heart disease and stroke   Eye Exam -- Do Yearly -- Annual diabetic eye exam   Healthy weight Wt Readings from Last 4 Encounters:   05/31/23 87.4 kg (192 lb 9.6 oz)   02/10/23 87.1 kg (192 lb)   11/01/22 87.7 kg (193 lb 6.4 oz)   07/20/22 88.1 kg (194 lb 3.2 oz)      Body mass index is 28.24 kg/m .  Goal BMI under 30, best is under 25.      -- Trying to exercise daily (goal at least 20 min/day) with moderate aerobic activity   -- Eat healthy (resources from ADA at http://www.diabetes.org/)   -- Taking good care of my feet. Consider seeing the Podiatrist   -- Check blood sugars as directed, record in log book and bring to every appointment    Insurance companies are grading you and I on your blood sugar control -- Goal is to get your A1c down to 7.9% or lower and NO Smoking!  -- Medicare and most insurance companies, will only cover Hemoglobin A1c labs to be rechecked every 91+ days.      Return for Diabetes labs and clinic follow-up appointment every 3 to 4 months.    Schedule lab only appointment --- A few days AFTER: 8/29/23   Schedule clinic appointment with Dr. Wilson -- Same day as labs, or 1-2 days later.        Patient Education   Personalized Prevention Plan  You are due for the preventive services outlined below.  Your care team is available to assist you in scheduling these services.  If you have already completed any of these items, please share that information with your care team to update in your medical record.  Health Maintenance Due   Topic Date Due    Pneumococcal Vaccine (1 - PCV) Never done    Zoster (Shingles) Vaccine (1 of 2) Never done    COVID-19 Vaccine (4 - Moderna series) 01/17/2022    Eye Exam  02/11/2022    Annual Wellness Visit  03/18/2023     Preventive Health Recommendations  See your health care provider every  year to  Review health changes.   Discuss preventive care.    Review your medicines if your doctor has prescribed any.  Talk with your health care provider about whether you should have a test to screen for prostate cancer (PSA).  Every 3 years, have a diabetes test (fasting glucose). If you are at risk for diabetes, you should have this test more often.  Every 5 years, have a cholesterol test. Have this test more often if you are at risk for high cholesterol or heart disease.   Every 10 years, have a colonoscopy. Or, have a yearly FIT test (stool test). These exams will check for colon cancer.  Talk to with your health care provider about screening for Abdominal Aortic Aneurysm if you have a family history of AAA or have a history of smoking.    Shots:   Get a flu shot each year.   Get a tetanus shot every 10 years.   Talk to your doctor about your pneumonia vaccines. There are now two you should receive - Pneumovax (PPSV 23) and Prevnar (PCV 13).  Talk to your pharmacist about a shingles vaccine.   Talk to your doctor about the hepatitis B vaccine.    Nutrition:   Eat at least 5 servings of fruits and vegetables each day.   Eat whole-grain bread, whole-wheat pasta and brown rice instead of white grains and rice.   Get adequate Calcium and Vitamin D.     Lifestyle  Exercise for at least 150 minutes a week (30 minutes a day, 5 days a week). This will help you control your weight and prevent disease.   Limit alcohol to one drink per day.   No smoking.   Wear sunscreen to prevent skin cancer.   See your dentist every six months for an exam and cleaning.   See your eye doctor every 1 to 2 years to screen for conditions such as glaucoma, macular degeneration and cataracts.    Personalized Prevention Plan  You are due for the preventive services outlined below.  Your care team is available to assist you in scheduling these services.  If you have already completed any of these items, please share that information with  your care team to update in your medical record.  Health Maintenance   Topic Date Due    Pneumococcal Vaccine: 65+ Years (1 - PCV) Never done    ZOSTER IMMUNIZATION (1 of 2) Never done    COVID-19 Vaccine (4 - Moderna series) 01/17/2022    EYE EXAM  02/11/2022    MEDICARE ANNUAL WELLNESS VISIT  03/18/2023    DTAP/TDAP/TD IMMUNIZATION (2 - Td or Tdap) 11/25/2023    A1C  11/30/2023    DIABETIC FOOT EXAM  02/10/2024    BMP  05/31/2024    LIPID  05/31/2024    MICROALBUMIN  05/31/2024    FALL RISK ASSESSMENT  05/31/2024    COLORECTAL CANCER SCREENING  11/28/2027    ADVANCE CARE PLANNING  05/31/2028    PHQ-2 (once per calendar year)  Completed    INFLUENZA VACCINE  Completed    URINALYSIS  Completed    AORTIC ANEURYSM SCREENING (SYSTEM ASSIGNED)  Completed    HEPATITIS C SCREENING  Addressed    IPV IMMUNIZATION  Aged Out    MENINGITIS IMMUNIZATION  Aged Out       Urinary Incontinence (Male)  We understand that gender is a spectrum. We may use gendered terms to talk about anatomy and health risk. Please use this sheet in a way that works best for you and your provider as you talk about your care.     Urinary incontinence means not being able to control the release of urine from the bladder.   Causes  Common causes of urinary incontinence in men include:  Infection  Certain medicines  Aging  Poor pelvic muscle tone  Bladder spasms  Obesity  Enlarged prostate  Trouble urinating and fully emptying the bladder (urinary retention)  Other things that can cause incontinence are:   Nervous system diseases  Diabetes  Sleep apnea  Urinary tract infections  Prostate surgery  Pelvic injury  Constipation and smoking have also been identified as risk factors.   Symptoms  Urge incontinence (overactive bladder). This is a sudden urge to urinate. It occurs even though there may not be much urine in the bladder. The need to urinate often during the night is common. It's due to overactive bladder muscles.  Stress incontinence. This is urine  leakage that you can't control. It can occur with sneezing, coughing, and other actions that put stress on the bladder.    Treatment  Treatment depends on what is causing the condition. Bladder infections are treated with antibiotics. Urinary retention is treated with a bladder catheter.   Home care  Follow these guidelines when caring for yourself at home:  Don't have any foods and drinks that may irritate the bladder. This includes:  Chocolate  Alcohol  Caffeine  Carbonated drinks  Acidic fruits and juices  Limit fluids to 6 to 8 cups a day.  Lose weight if you are overweight. This may reduce your symptoms.  If advised, do regular pelvic muscle-strengthening exercises such as Kegel exercises.  If needed, wear absorbent pads to catch urine. Change the pads often. This is for good hygiene and to prevent skin and bladder infections.  Bathe daily for good hygiene.  If an antibiotic was prescribed to treat a bladder infection, take it until it's finished. Keep taking it even if you are feeling better. This is to make sure your infection has cleared.  If a catheter was left in place, keep bacteria from getting into the collection bag. Don't disconnect the catheter from the collection bag.  Use a leg band to secure the catheter drainage tube, so it does not pull on the catheter. Drain the collection bag when it becomes full. To do this, use the drain spout at the bottom of the bag. Don't disconnect the bag from the catheter.  Don't pull on or try to remove a catheter. The catheter must be removed by a healthcare provider.  If you smoke, stop. Ask your provider for help if you can't do this on your own.  Follow-up care  Follow up with your healthcare provider, or as advised.  When to get medical advice  Call your healthcare provider right away if any of these occur:   Fever over 100.4 F (38 C), or as directed by your provider  Bladder pain or fullness  Belly swelling, nausea, or vomiting  Back pain  Weakness, dizziness,  or fainting  If a catheter was left in place, return if:  The catheter falls out  The catheter stops draining for 6 hours  Your urine gets cloudy or smells bad  Gay last reviewed this educational content on 6/1/2022 2000-2022 [employer]. All rights reserved. This information is not intended as a substitute for professional medical care. Always follow your healthcare professional's instructions.

## 2023-05-31 NOTE — NURSING NOTE
"Chief Complaint   Patient presents with     Diabetes     Medicare Wellness Visit         Initial /74 (BP Location: Right arm, Patient Position: Sitting, Cuff Size: Adult Regular)   Pulse 93   Temp 97  F (36.1  C)   Resp 20   Ht 1.759 m (5' 9.25\")   Wt 87.4 kg (192 lb 9.6 oz)   SpO2 97%   BMI 28.24 kg/m   Estimated body mass index is 28.24 kg/m  as calculated from the following:    Height as of this encounter: 1.759 m (5' 9.25\").    Weight as of this encounter: 87.4 kg (192 lb 9.6 oz).       FOOD SECURITY SCREENING QUESTIONS:    The next two questions are to help us understand your food security.  If you are feeling you need any assistance in this area, we have resources available to support you today.    Hunger Vital Signs:  Within the past 12 months we worried whether our food would run out before we got money to buy more. Never  Within the past 12 months the food we bought just didn't last and we didn't have money to get more. Never    Advance Care Directive on file? yes      Medication reconciliation complete.      Primo Sheth,on 5/31/2023 at 7:50 AM        "

## 2023-05-31 NOTE — PROGRESS NOTES
Assessment & Plan     ICD-10-CM    1. Controlled type 2 diabetes mellitus with complication, with long-term current use of insulin (H)  E11.8 insulin glargine (LANTUS SOLOSTAR) 100 UNIT/ML pen    Z79.4 metFORMIN (GLUCOPHAGE) 1000 MG tablet      2. Mild nonproliferative diabetic retinopathy of left eye without macular edema associated with type 2 diabetes mellitus (H)  E11.3292       3. CKD (chronic kidney disease) stage 2, GFR 60-89 ml/min  N18.2       4. Benign essential hypertension  I10 losartan (COZAAR) 50 MG tablet      5. Mixed hyperlipidemia  E78.2       6. Vitamin D deficiency  E55.9       7. Encounter for screening for malignant neoplasm of prostate  Z12.5 PSA Screen GH      8. Vaccine counseling  Z71.85       9. Encounter for Medicare annual wellness exam  Z00.00       Patient presents for Medicare annual wellness visit as well as follow-up multiple issues.    Type 2 diabetes with stage II chronic kidney disease.  Hemoglobin A1c is well controlled.  Continues with Lantus, metformin.  Denies significant or frequent hypoglycemia.  Doing well with current medication regimen.  Needs medication refills.  Has diabetic retinopathy, follows regularly with his ophthalmologist.    Stage II chronic kidney disease, kidney function has been slowly declining.  Encourage NSAID avoidance.    Vitamin D deficiency.  Ongoing.  Encourage continued oral supplementation.    Prostate lab cancer screening, check PSA.    HYPERTENSION - Ongoing. Blood pressure is currently well controlled.  Medication side effects: None. Denies syncope or presyncope.  No changes for now. Continue - Losartan.   Medication list reviewed/updated. Refills completed as needed.      MIXED HYPERLIPIDEMIA.  Ongoing. LDL is at goal: No. Triglycerides are at goal: Yes.  Hopefully lifestyle modifications will improve cholesterol levels, otherwise we will need to consider additional medication dose adjustments or medication changes.  Medication side effects  reported: None.   Continue current medications for now - Lipitor. Medication list reviewed/updated. Refills completed as needed.  Recent Labs   Lab Test 05/31/23  0649 02/10/23  0653   CHOL 129 132   HDL 45 51   LDL 72 68   TRIG 62 66       Vaccine counseling completed.  Encouraged annual vaccinations.    Encouraged regular exercise.     Return in about 3 months (around 8/31/2023) for - Labs every 91+ days, with DM Follow-up, Same Day or 1-2 days later with Dr. Wilson.    Moshe Wilson MD  [facility] AND [facility]     SUBJECTIVE:   Vicente is a 68 year old who presents for Preventive Visit.      5/31/2023     7:44 AM   Additional Questions   Roomed by Primo MICHEL / SHAKIRA   Accompanied by n/a   Patient has been advised of split billing requirements and indicates understanding: Yes  Are you in the first 12 months of your Medicare coverage?  No    History of Present Illness       Diabetes:   He presents for follow up of diabetes.  He is checking home blood glucose four or more times daily. He checks blood glucose before and after meals and before meals.  Blood glucose is sometimes over 200 and sometimes under 70. He is aware of hypoglycemia symptoms including weakness and blurred vision. He has no concerns regarding his diabetes at this time.  He is having burning in feet and blurry vision. The patient has had a diabetic eye exam in the last 12 months. Eye exam performed on 04/2023. Location of last eye exam [facility].        He eats 4 or more servings of fruits and vegetables daily.He consumes 1 sweetened beverage(s) daily.He exercises with enough effort to increase his heart rate 60 or more minutes per day.  He exercises with enough effort to increase his heart rate 7 days per week.   He is not taking prescribed medications regularly due to None.  Healthy Habits:     In general, how would you rate your overall health?  Good    Frequency of exercise:  6-7 days/week    Duration of exercise:  45-60 minutes    Do  "you usually eat at least 4 servings of fruit and vegetables a day, include whole grains    & fiber and avoid regularly eating high fat or \"junk\" foods?  Yes    Taking medications regularly:  Yes    Barriers to taking medications:  None    Medication side effects:  None    Ability to successfully perform activities of daily living:  No assistance needed    Home Safety:  Lack of grab bars in the bathroom    Hearing Impairment:  No hearing concerns    In the past 6 months, have you been bothered by leaking of urine? Yes    In general, how would you rate your overall mental or emotional health?  Excellent      PHQ-2 Total Score: 0    Additional concerns today:  Yes      Have you ever done Advance Care Planning? (For example, a Health Directive, POLST, or a discussion with a medical provider or your loved ones about your wishes): Yes, advance care planning is on file.     Fall risk  Fallen 2 or more times in the past year?: No  Any fall with injury in the past year?: No    Cognitive Screening   1) Repeat 3 items (Leader, Season, Table)    2) Clock draw: NORMAL  3) 3 item recall: Recalls 3 objects  Results: 3 items recalled: COGNITIVE IMPAIRMENT LESS LIKELY    Mini-CogTM Copyright S Antonia. Licensed by the author for use in [facility]; reprinted with permission (soob@[facility]). All rights reserved.      Do you have sleep apnea, excessive snoring or daytime drowsiness?: no    Reviewed and updated as needed this visit by clinical staff   Tobacco  Allergies  Meds  Problems  Med Hx  Surg Hx  Fam Hx  Soc   Hx        Reviewed and updated as needed this visit by Provider   Tobacco  Allergies  Meds  Problems  Med Hx  Surg Hx  Fam Hx         Social History     Tobacco Use     Smoking status: Never     Smokeless tobacco: Never   Vaping Use     Vaping status: Never Used   Substance Use Topics     Alcohol use: No         3/18/2022     7:44 AM   Alcohol Use   Prescreen: >3 drinks/day or >7 drinks/week? Not " Applicable     Do you have a current opioid prescription? No  Do you use any other controlled substances or medications that are not prescribed by a provider? None    Current providers sharing in care for this patient include:   Patient Care Team:  Moshe Wilson MD as PCP - General (Internal Medicine)  Moshe Wilson MD as Assigned PCP    The following health maintenance items are reviewed in Epic and correct as of today:  Health Maintenance   Topic Date Due     Pneumococcal Vaccine: 65+ Years (1 - PCV) Never done     ZOSTER IMMUNIZATION (1 of 2) Never done     COVID-19 Vaccine (4 - Moderna series) 01/17/2022     EYE EXAM  02/11/2022     DTAP/TDAP/TD IMMUNIZATION (2 - Td or Tdap) 11/25/2023     A1C  11/30/2023     DIABETIC FOOT EXAM  02/10/2024     MEDICARE ANNUAL WELLNESS VISIT  05/31/2024     BMP  05/31/2024     LIPID  05/31/2024     MICROALBUMIN  05/31/2024     FALL RISK ASSESSMENT  05/31/2024     COLORECTAL CANCER SCREENING  11/28/2027     ADVANCE CARE PLANNING  05/31/2028     PHQ-2 (once per calendar year)  Completed     INFLUENZA VACCINE  Completed     URINALYSIS  Completed     AORTIC ANEURYSM SCREENING (SYSTEM ASSIGNED)  Completed     HEPATITIS C SCREENING  Addressed     IPV IMMUNIZATION  Aged Out     MENINGITIS IMMUNIZATION  Aged Out     Review of Systems   Constitutional: Negative for chills, fatigue and fever.   HENT: Negative for congestion and hearing loss.    Eyes: Negative for pain and visual disturbance.   Respiratory: Negative for cough, shortness of breath and wheezing.    Cardiovascular: Negative for chest pain and palpitations.   Gastrointestinal: Negative for abdominal pain, diarrhea, nausea and vomiting.   Endocrine: Negative for cold intolerance and heat intolerance.   Genitourinary: Negative for dysuria and hematuria.   Musculoskeletal: Negative for arthralgias and myalgias.        Right 4th finger, in palm. starting to develop trigger finger, improved with regular home therapy   Skin:  "Negative for pallor.   Allergic/Immunologic: Negative for immunocompromised state.   Neurological: Positive for numbness (+ bilateral feet, comes and goes -- better with better glucose control). Negative for dizziness and light-headedness.   Hematological: Does not bruise/bleed easily.   Psychiatric/Behavioral: Negative for agitation and confusion.     OBJECTIVE:   /74 (BP Location: Right arm, Patient Position: Sitting, Cuff Size: Adult Regular)   Pulse 93   Temp 97  F (36.1  C)   Resp 20   Ht 1.759 m (5' 9.25\")   Wt 87.4 kg (192 lb 9.6 oz)   SpO2 97%   BMI 28.24 kg/m   Estimated body mass index is 28.24 kg/m  as calculated from the following:    Height as of this encounter: 1.759 m (5' 9.25\").    Weight as of this encounter: 87.4 kg (192 lb 9.6 oz).  Physical Exam  Constitutional:       General: He is not in acute distress.     Appearance: He is well-developed. He is not diaphoretic.   HENT:      Head: Normocephalic and atraumatic.   Eyes:      General: No scleral icterus.     Conjunctiva/sclera: Conjunctivae normal.   Neck:      Vascular: No carotid bruit.   Cardiovascular:      Rate and Rhythm: Normal rate and regular rhythm.      Pulses: Normal pulses.   Pulmonary:      Effort: Pulmonary effort is normal.      Breath sounds: Normal breath sounds.   Abdominal:      Palpations: Abdomen is soft.      Tenderness: There is no abdominal tenderness.   Musculoskeletal:         General: No tenderness or deformity.      Cervical back: Neck supple.      Right lower leg: No edema.      Left lower leg: No edema.   Lymphadenopathy:      Cervical: No cervical adenopathy.   Skin:     General: Skin is warm and dry.      Findings: No rash.   Neurological:      Mental Status: He is alert and oriented to person, place, and time. Mental status is at baseline.   Psychiatric:         Mood and Affect: Mood normal.         Behavior: Behavior normal.       Recent Labs   Lab Test 05/31/23  0649 02/10/23  0653 11/01/22  0649 " "  A1C 7.3* 7.5* 7.6*   LDL 72 68 61   HDL 45 51 42   TRIG 62 66 68   ALT 24 23 13   CR 0.90 0.93 0.96   GFRESTIMATED >90 89 87   POTASSIUM 4.4 4.4 4.5   TSH 5.77* 6.35* 5.09*   T4 1.13 1.16 0.90   WBC 8.8 8.1 7.5   HGB 15.7 15.8 15.5    233 250   ALBUMIN 4.4 4.5 4.4     Hemoglobin A1c is well controlled.  LDL is high and not at goal.  HDL and triglycerides are at goal.  ALT normal.  Creatinine is at baseline.  Potassium normal.  TSH is elevated, free T4 is within normal range.  CBC normal.  Albumin normal.    Patient has been advised of split billing requirements and indicates understanding: Yes      COUNSELING:  Reviewed preventive health counseling, as reflected in patient instructions  Special attention given to:       Consider AAA screening for ages 65-75 and smoking history       Regular exercise       Healthy diet/nutrition       Vision screening       Hearing screening       Dental care       Bladder control       Fall risk prevention       Immunizations    BMI:   Estimated body mass index is 28.24 kg/m  as calculated from the following:    Height as of this encounter: 1.759 m (5' 9.25\").    Weight as of this encounter: 87.4 kg (192 lb 9.6 oz).         He reports that he has never smoked. He has never used smokeless tobacco.      Appropriate preventive services were discussed with this patient, including applicable screening as appropriate for cardiovascular disease, diabetes, osteopenia/osteoporosis, and glaucoma.  As appropriate for age/gender, discussed screening for colorectal cancer, prostate cancer, breast cancer, and cervical cancer. Checklist reviewing preventive services available has been given to the patient.    Reviewed patients plan of care and provided an AVS. The Complex Care Plan (for patients with higher acuity and needing more deliberate coordination of services) for Oh meets the Care Plan requirement. This Care Plan has been established and reviewed with the " Patient.          Moshe Wilson MD  [facility]    Identified Health Risks:    I have reviewed Opioid Use Disorder and Substance Use Disorder risk factors and made any needed referrals.       Information on urinary incontinence and treatment options given to patient.

## 2023-06-13 NOTE — NURSING NOTE
"Chief Complaint   Patient presents with     Diabetes         Initial /74 (BP Location: Right arm, Patient Position: Sitting, Cuff Size: Adult Large)   Pulse 91   Temp 97.4  F (36.3  C) (Temporal)   Resp 16   Ht 1.778 m (5' 10\")   Wt 88.1 kg (194 lb 3.2 oz)   SpO2 98%   BMI 27.86 kg/m   Estimated body mass index is 27.86 kg/m  as calculated from the following:    Height as of this encounter: 1.778 m (5' 10\").    Weight as of this encounter: 88.1 kg (194 lb 3.2 oz).       FOOD SECURITY SCREENING QUESTIONS:    The next two questions are to help us understand your food security.  If you are feeling you need any assistance in this area, we have resources available to support you today.    Hunger Vital Signs:  Within the past 12 months we worried whether our food would run out before we got money to buy more. Never  Within the past 12 months the food we bought just didn't last and we didn't have money to get more. Never    Advance Care Directive on file? no      Medication reconciliation complete.      Primo Romero,on 7/20/2022 at 7:56 AM        " PAST SURGICAL HISTORY:  S/P triple vessel bypass

## 2023-08-01 DIAGNOSIS — E11.8 CONTROLLED TYPE 2 DIABETES MELLITUS WITH COMPLICATION, WITH LONG-TERM CURRENT USE OF INSULIN (H): ICD-10-CM

## 2023-08-01 DIAGNOSIS — Z79.4 CONTROLLED TYPE 2 DIABETES MELLITUS WITH COMPLICATION, WITH LONG-TERM CURRENT USE OF INSULIN (H): ICD-10-CM

## 2023-08-01 NOTE — TELEPHONE ENCOUNTER
Last Prescription Date: 5/31/23  Last Qty/Refills: 180 / 1  Last Office Visit: 5/31/23  Future Office Visit: 9/7/23     Corinne R Thayer, RN on 8/1/2023 at 9:44 AM    Requested Prescriptions   Pending Prescriptions Disp Refills    metFORMIN (GLUCOPHAGE) 1000 MG tablet [Pharmacy Med Name: METFORMIN 1000MG TABLET] 180 tablet 1     Sig: TAKE 1 TABLET (1,000 MG) BYMOUTH 2 TIMES DAILY (WITH MEALS)       Biguanide Agents Passed - 8/1/2023  6:42 AM        Passed - Patient is age 10 or older        Passed - Patient has documented A1c within the specified period of time.     If HgbA1C is 8 or greater, it needs to be on file within the past 3 months.  If less than 8, must be on file within the past 6 months.     Recent Labs   Lab Test 05/31/23  0649 03/12/18  0745 12/06/17  0853   A1C 7.3*   < >  --    [id number]  --   --  7.2*    < > = values in this interval not displayed.             Passed - Patient's CR is NOT>1.4 OR Patient's EGFR is NOT<45 within past 12 mos.     Recent Labs   Lab Test 05/31/23  0649 08/04/21  0716 04/09/21  1029   GFRESTIMATED >90   < > 77   GFRESTBLACK  --   --  >90    < > = values in this interval not displayed.       Recent Labs   Lab Test 05/31/23  0649   CR 0.90

## 2023-08-22 DIAGNOSIS — E11.8 CONTROLLED TYPE 2 DIABETES MELLITUS WITH COMPLICATION, WITH LONG-TERM CURRENT USE OF INSULIN (H): ICD-10-CM

## 2023-08-22 DIAGNOSIS — Z79.4 CONTROLLED TYPE 2 DIABETES MELLITUS WITH COMPLICATION, WITH LONG-TERM CURRENT USE OF INSULIN (H): ICD-10-CM

## 2023-08-27 RX ORDER — INSULIN GLARGINE 100 [IU]/ML
INJECTION, SOLUTION SUBCUTANEOUS
Qty: 15 ML | Refills: 1 | Status: SHIPPED | OUTPATIENT
Start: 2023-08-27 | End: 2023-09-07

## 2023-09-07 ENCOUNTER — LAB (OUTPATIENT)
Dept: LAB | Facility: OTHER | Age: 68
End: 2023-09-07
Attending: NURSE PRACTITIONER
Payer: MEDICARE

## 2023-09-07 ENCOUNTER — OFFICE VISIT (OUTPATIENT)
Dept: INTERNAL MEDICINE | Facility: OTHER | Age: 68
End: 2023-09-07
Attending: NURSE PRACTITIONER
Payer: MEDICARE

## 2023-09-07 VITALS
TEMPERATURE: 96.9 F | BODY MASS INDEX: 28.91 KG/M2 | HEIGHT: 69 IN | SYSTOLIC BLOOD PRESSURE: 164 MMHG | RESPIRATION RATE: 17 BRPM | HEART RATE: 86 BPM | DIASTOLIC BLOOD PRESSURE: 92 MMHG | OXYGEN SATURATION: 98 % | WEIGHT: 195.2 LBS

## 2023-09-07 DIAGNOSIS — E11.8 CONTROLLED TYPE 2 DIABETES MELLITUS WITH COMPLICATION, WITH LONG-TERM CURRENT USE OF INSULIN (H): Primary | ICD-10-CM

## 2023-09-07 DIAGNOSIS — Z79.4 CONTROLLED TYPE 2 DIABETES MELLITUS WITH COMPLICATION, WITH LONG-TERM CURRENT USE OF INSULIN (H): ICD-10-CM

## 2023-09-07 DIAGNOSIS — E78.2 MIXED HYPERLIPIDEMIA: ICD-10-CM

## 2023-09-07 DIAGNOSIS — N18.2 CKD (CHRONIC KIDNEY DISEASE) STAGE 2, GFR 60-89 ML/MIN: ICD-10-CM

## 2023-09-07 DIAGNOSIS — E11.8 CONTROLLED TYPE 2 DIABETES MELLITUS WITH COMPLICATION, WITH LONG-TERM CURRENT USE OF INSULIN (H): ICD-10-CM

## 2023-09-07 DIAGNOSIS — I10 BENIGN ESSENTIAL HYPERTENSION: ICD-10-CM

## 2023-09-07 DIAGNOSIS — E55.9 VITAMIN D DEFICIENCY: ICD-10-CM

## 2023-09-07 DIAGNOSIS — Z79.4 CONTROLLED TYPE 2 DIABETES MELLITUS WITH COMPLICATION, WITH LONG-TERM CURRENT USE OF INSULIN (H): Primary | ICD-10-CM

## 2023-09-07 LAB
ALBUMIN SERPL BCG-MCNC: 4.6 G/DL (ref 3.5–5.2)
ALBUMIN UR-MCNC: NEGATIVE MG/DL
ALP SERPL-CCNC: 104 U/L (ref 40–129)
ALT SERPL W P-5'-P-CCNC: 22 U/L (ref 0–70)
ANION GAP SERPL CALCULATED.3IONS-SCNC: 8 MMOL/L (ref 7–15)
APPEARANCE UR: CLEAR
AST SERPL W P-5'-P-CCNC: 20 U/L (ref 0–45)
BILIRUB SERPL-MCNC: 0.5 MG/DL
BILIRUB UR QL STRIP: NEGATIVE
BUN SERPL-MCNC: 12.3 MG/DL (ref 8–23)
CALCIUM SERPL-MCNC: 9.6 MG/DL (ref 8.8–10.2)
CHLORIDE SERPL-SCNC: 102 MMOL/L (ref 98–107)
CHOLEST SERPL-MCNC: 121 MG/DL
COLOR UR AUTO: YELLOW
CREAT SERPL-MCNC: 0.88 MG/DL (ref 0.67–1.17)
CREAT UR-MCNC: 18.9 MG/DL
DEPRECATED HCO3 PLAS-SCNC: 27 MMOL/L (ref 22–29)
EGFRCR SERPLBLD CKD-EPI 2021: >90 ML/MIN/1.73M2
ERYTHROCYTE [DISTWIDTH] IN BLOOD BY AUTOMATED COUNT: 13.3 % (ref 10–15)
GLUCOSE SERPL-MCNC: 164 MG/DL (ref 70–99)
GLUCOSE UR STRIP-MCNC: NEGATIVE MG/DL
HBA1C MFR BLD: 7.5 % (ref 4–6.2)
HCT VFR BLD AUTO: 46.5 % (ref 40–53)
HDLC SERPL-MCNC: 45 MG/DL
HGB BLD-MCNC: 15.9 G/DL (ref 13.3–17.7)
HGB UR QL STRIP: NEGATIVE
KETONES UR STRIP-MCNC: NEGATIVE MG/DL
LDLC SERPL CALC-MCNC: 63 MG/DL
LEUKOCYTE ESTERASE UR QL STRIP: NEGATIVE
MCH RBC QN AUTO: 29.6 PG (ref 26.5–33)
MCHC RBC AUTO-ENTMCNC: 34.2 G/DL (ref 31.5–36.5)
MCV RBC AUTO: 87 FL (ref 78–100)
MICROALBUMIN UR-MCNC: <12 MG/L
MICROALBUMIN/CREAT UR: NORMAL MG/G{CREAT}
NITRATE UR QL: NEGATIVE
NONHDLC SERPL-MCNC: 76 MG/DL
PH UR STRIP: 7.5 [PH] (ref 5–9)
PLATELET # BLD AUTO: 221 10E3/UL (ref 150–450)
POTASSIUM SERPL-SCNC: 4.6 MMOL/L (ref 3.4–5.3)
PROT SERPL-MCNC: 7.4 G/DL (ref 6.4–8.3)
RBC # BLD AUTO: 5.37 10E6/UL (ref 4.4–5.9)
SODIUM SERPL-SCNC: 137 MMOL/L (ref 136–145)
SP GR UR STRIP: 1 (ref 1–1.03)
T4 FREE SERPL-MCNC: 1.11 NG/DL (ref 0.9–1.7)
TRIGL SERPL-MCNC: 64 MG/DL
TSH SERPL DL<=0.005 MIU/L-ACNC: 6.44 UIU/ML (ref 0.3–4.2)
UROBILINOGEN UR STRIP-MCNC: NORMAL MG/DL
WBC # BLD AUTO: 7.6 10E3/UL (ref 4–11)

## 2023-09-07 PROCEDURE — 80053 COMPREHEN METABOLIC PANEL: CPT | Mod: ZL

## 2023-09-07 PROCEDURE — 81003 URINALYSIS AUTO W/O SCOPE: CPT | Mod: ZL

## 2023-09-07 PROCEDURE — 83036 HEMOGLOBIN GLYCOSYLATED A1C: CPT | Mod: ZL

## 2023-09-07 PROCEDURE — 82570 ASSAY OF URINE CREATININE: CPT | Mod: ZL

## 2023-09-07 PROCEDURE — 80061 LIPID PANEL: CPT | Mod: ZL

## 2023-09-07 PROCEDURE — 84443 ASSAY THYROID STIM HORMONE: CPT | Mod: ZL

## 2023-09-07 PROCEDURE — 36415 COLL VENOUS BLD VENIPUNCTURE: CPT | Mod: ZL

## 2023-09-07 PROCEDURE — 99214 OFFICE O/P EST MOD 30 MIN: CPT | Performed by: INTERNAL MEDICINE

## 2023-09-07 PROCEDURE — 84439 ASSAY OF FREE THYROXINE: CPT | Mod: ZL

## 2023-09-07 PROCEDURE — 85027 COMPLETE CBC AUTOMATED: CPT | Mod: ZL

## 2023-09-07 PROCEDURE — G0463 HOSPITAL OUTPT CLINIC VISIT: HCPCS

## 2023-09-07 RX ORDER — GLIPIZIDE 10 MG/1
TABLET ORAL
Qty: 270 TABLET | Refills: 1 | Status: SHIPPED | OUTPATIENT
Start: 2023-09-07 | End: 2024-01-09

## 2023-09-07 RX ORDER — LOSARTAN POTASSIUM 50 MG/1
50 TABLET ORAL DAILY
Qty: 90 TABLET | Refills: 1 | Status: SHIPPED | OUTPATIENT
Start: 2023-09-07 | End: 2024-01-09

## 2023-09-07 RX ORDER — ATORVASTATIN CALCIUM 40 MG/1
40 TABLET, FILM COATED ORAL DAILY
Qty: 90 TABLET | Refills: 1 | Status: SHIPPED | OUTPATIENT
Start: 2023-09-07 | End: 2024-01-09

## 2023-09-07 RX ORDER — INSULIN GLARGINE 100 [IU]/ML
INJECTION, SOLUTION SUBCUTANEOUS
Qty: 15 ML | Refills: 1 | Status: SHIPPED | OUTPATIENT
Start: 2023-09-07 | End: 2024-01-09

## 2023-09-07 ASSESSMENT — ENCOUNTER SYMPTOMS
CHILLS: 0
COUGH: 0
WHEEZING: 0
HEMATURIA: 0
ARTHRALGIAS: 0
FEVER: 0
NAUSEA: 0
MYALGIAS: 0
DYSURIA: 0
FATIGUE: 0
BRUISES/BLEEDS EASILY: 0
DIZZINESS: 0
PALPITATIONS: 0
LIGHT-HEADEDNESS: 0
ABDOMINAL PAIN: 0
EYE PAIN: 0
NUMBNESS: 1
DIARRHEA: 0
CONFUSION: 0
SHORTNESS OF BREATH: 0
AGITATION: 0
VOMITING: 0

## 2023-09-07 ASSESSMENT — PAIN SCALES - GENERAL: PAINLEVEL: SEVERE PAIN (7)

## 2023-09-07 NOTE — PROGRESS NOTES
Assessment & Plan     ICD-10-CM    1. Controlled type 2 diabetes mellitus with complication, with long-term current use of insulin (H)  E11.8 glipiZIDE (GLUCOTROL) 10 MG tablet    Z79.4 insulin glargine (LANTUS SOLOSTAR) 100 UNIT/ML pen     metFORMIN (GLUCOPHAGE) 1000 MG tablet      2. CKD (chronic kidney disease) stage 2, GFR 60-89 ml/min  N18.2       3. Benign essential hypertension  I10 losartan (COZAAR) 50 MG tablet      4. Mixed hyperlipidemia  E78.2 atorvastatin (LIPITOR) 40 MG tablet      5. Vitamin D deficiency  E55.9         Patient presents for follow-up multiple issues.    Vitamin D deficiency.  Ongoing.  Encourage continued oral replacement.    HYPERTENSION - Ongoing. Blood pressure is currently well controlled.  Medication side effects: None. Denies syncope or presyncope.  No changes for now. Continue - Losartan.   Medication list reviewed/updated. Refills completed as needed.      MIXED HYPERLIPIDEMIA.  Ongoing. LDL is at goal: Yes. Triglycerides are at goal: Yes.  Medication side effects reported: None.   Continue current medications for now - Insulin, glipizide, metformin. Medication list reviewed/updated. Refills completed as needed.  Recent Labs   Lab Test 09/07/23  0655 05/31/23  0649   CHOL 121 129   HDL 45 45   LDL 63 72   TRIG 64 62        HYPOTHYROIDISM - Patient has a been having progressive drop in T4 / Sub-clinical hypothyroidism. Patient has been doing reasonably well. No symptoms reported. Reports: denies fatigue, weight changes, heat/cold intolerance, bowel/skin changes or CVS symptoms.                       TSH   Date Value Ref Range Status   09/07/2023 6.44 (H) 0.30 - 4.20 uIU/mL Final   11/01/2022 5.09 (H) 0.40 - 4.00 mU/L Final        Type 2 Diabetes Mellitus, with nephropathy, with neuropathy, Reports ongoing/previous: numbness and burning.  Blood sugar control has been good with minimal hyperglycemia. Doing well with oral agents and insulin injections - Lantus, Glipizide,  Metformin.  Medication list reviewed/updated. Refills completed as needed.    Complicating factors include but are not limited to: hypertension, hyperlipidemia, neuropathy, and chronic kidney disease.        Chronic Kidney Disease, Stage 2 (GFR 60-89), chronic, ongoing.  Kidney function has been slowly declining.  Encouraged NSAID avoidance.          Encouraged regular exercise.     Return in about 3 months (around 12/7/2023) for - Labs every 91+ days, with DM Follow-up, Same Day or 1-2 days later with Dr. Wilson.    Moshe Wilson MD  [facility] AND [facility]     Kendal Zhang is a 68 year old, presenting for the following health issues:  Diabetes        9/7/2023     7:53 AM   Additional Questions   Roomed by Beatris ROSENBERG       History of Present Illness       Diabetes:   He presents for follow up of diabetes.  He is checking home blood glucose three times daily.   He checks blood glucose before meals, after meals and before and after meals.  Blood glucose is sometimes over 200 and sometimes under 70. He is aware of hypoglycemia symptoms including shakiness, weakness and blurred vision.   He is concerned about other.   He is having numbness in feet and burning in feet.            He eats 4 or more servings of fruits and vegetables daily.He consumes 0 sweetened beverage(s) daily.He exercises with enough effort to increase his heart rate 60 or more minutes per day.  He exercises with enough effort to increase his heart rate 7 days per week.   He is taking medications regularly.     Review of Systems   Constitutional:  Negative for chills, fatigue and fever.   HENT:  Negative for congestion and hearing loss.    Eyes:  Negative for pain and visual disturbance.   Respiratory:  Negative for cough, shortness of breath and wheezing.    Cardiovascular:  Negative for chest pain and palpitations.   Gastrointestinal:  Negative for abdominal pain, diarrhea, nausea and vomiting.   Endocrine: Negative for cold  "intolerance and heat intolerance.   Genitourinary:  Negative for dysuria and hematuria.   Musculoskeletal:  Negative for arthralgias and myalgias.        Right 4th finger, in palm. starting to develop trigger finger, improved with regular home therapy   Skin:  Negative for pallor.   Allergic/Immunologic: Negative for immunocompromised state.   Neurological:  Positive for numbness (+ bilateral feet, comes and goes -- better with better glucose control). Negative for dizziness and light-headedness.   Hematological:  Does not bruise/bleed easily.   Psychiatric/Behavioral:  Negative for agitation and confusion.           Objective    BP (!) 164/96 (BP Location: Right arm, Patient Position: Sitting, Cuff Size: Adult Regular)   Pulse 86   Temp 96.9  F (36.1  C) (Tympanic)   Resp 17   Ht 1.755 m (5' 9.09\")   Wt 88.5 kg (195 lb 3.2 oz)   SpO2 98%   BMI 28.75 kg/m    Body mass index is 28.75 kg/m .  Physical Exam  Constitutional:       General: He is not in acute distress.     Appearance: He is well-developed. He is not diaphoretic.   HENT:      Head: Normocephalic and atraumatic.   Eyes:      General: No scleral icterus.     Conjunctiva/sclera: Conjunctivae normal.   Neck:      Vascular: No carotid bruit.   Cardiovascular:      Rate and Rhythm: Normal rate and regular rhythm.      Pulses: Normal pulses.   Pulmonary:      Effort: Pulmonary effort is normal.      Breath sounds: Normal breath sounds.   Abdominal:      Palpations: Abdomen is soft.      Tenderness: There is no abdominal tenderness.   Musculoskeletal:         General: No tenderness or deformity.      Cervical back: Neck supple.      Right lower leg: No edema.      Left lower leg: No edema.   Lymphadenopathy:      Cervical: No cervical adenopathy.   Skin:     General: Skin is warm and dry.      Findings: No rash.   Neurological:      Mental Status: He is alert. Mental status is at baseline.   Psychiatric:         Mood and Affect: Mood normal.         " Behavior: Behavior normal.          Recent Labs   Lab Test 09/07/23  0655 05/31/23  0649 02/10/23  0653   A1C 7.5* 7.3* 7.5*   LDL 63 72 68   HDL 45 45 51   TRIG 64 62 66   ALT 22 24 23   CR 0.88 0.90 0.93   GFRESTIMATED >90 >90 89   POTASSIUM 4.6 4.4 4.4   TSH 6.44* 5.77* 6.35*   T4 1.11 1.13 1.16   WBC 7.6 8.8 8.1   HGB 15.9 15.7 15.8    232 233   ALBUMIN 4.6 4.4 4.5     Hemoglobin A1c is well controlled.  LDL HDL and triglycerides are at goal.  ALT normal.  Creatinine is at baseline.  Potassium normal.  TSH has gone up slightly.  Free T4 slowly going down.  CBC normal.  Albumin normal.

## 2023-09-07 NOTE — PATIENT INSTRUCTIONS
Blood pressure is well controlled.   Diabetes is well controlled.     Medications refilled.   Labs are stable.     Results for orders placed or performed in visit on 09/07/23   Comprehensive metabolic panel     Status: Abnormal   Result Value Ref Range    Sodium 137 136 - 145 mmol/L    Potassium 4.6 3.4 - 5.3 mmol/L    Chloride 102 98 - 107 mmol/L    Carbon Dioxide (CO2) 27 22 - 29 mmol/L    Anion Gap 8 7 - 15 mmol/L    Urea Nitrogen 12.3 8.0 - 23.0 mg/dL    Creatinine 0.88 0.67 - 1.17 mg/dL    Calcium 9.6 8.8 - 10.2 mg/dL    Glucose 164 (H) 70 - 99 mg/dL    Alkaline Phosphatase 104 40 - 129 U/L    AST 20 0 - 45 U/L    ALT 22 0 - 70 U/L    Protein Total 7.4 6.4 - 8.3 g/dL    Albumin 4.6 3.5 - 5.2 g/dL    Bilirubin Total 0.5 <=1.2 mg/dL    GFR Estimate >90 >60 mL/min/1.73m2   Lipid Profile     Status: Normal   Result Value Ref Range    Cholesterol 121 <200 mg/dL    Triglycerides 64 <150 mg/dL    Direct Measure HDL 45 >=40 mg/dL    LDL Cholesterol Calculated 63 <=100 mg/dL    Non HDL Cholesterol 76 <130 mg/dL    Narrative    Cholesterol  Desirable:  <200 mg/dL    Triglycerides  Normal:  Less than 150 mg/dL  Borderline High:  150-199 mg/dL  High:  200-499 mg/dL  Very High:  Greater than or equal to 500 mg/dL    Direct Measure HDL  Female:  Greater than or equal to 50 mg/dL   Male:  Greater than or equal to 40 mg/dL    LDL Cholesterol  Desirable:  <100mg/dL  Above Desirable:  100-129 mg/dL   Borderline High:  130-159 mg/dL   High:  160-189 mg/dL   Very High:  >= 190 mg/dL    Non HDL Cholesterol  Desirable:  130 mg/dL  Above Desirable:  130-159 mg/dL  Borderline High:  160-189 mg/dL  High:  190-219 mg/dL  Very High:  Greater than or equal to 220 mg/dL   CBC with platelets     Status: Normal   Result Value Ref Range    WBC Count 7.6 4.0 - 11.0 10e3/uL    RBC Count 5.37 4.40 - 5.90 10e6/uL    Hemoglobin 15.9 13.3 - 17.7 g/dL    Hematocrit 46.5 40.0 - 53.0 %    MCV 87 78 - 100 fL    MCH 29.6 26.5 - 33.0 pg    MCHC 34.2 31.5  - 36.5 g/dL    RDW 13.3 10.0 - 15.0 %    Platelet Count 221 150 - 450 10e3/uL   Hemoglobin A1c     Status: Abnormal   Result Value Ref Range    Hemoglobin A1C 7.5 (H) 4.0 - 6.2 %   TSH with free T4 reflex     Status: Abnormal   Result Value Ref Range    TSH 6.44 (H) 0.30 - 4.20 uIU/mL   UA reflex to Microscopic     Status: Normal   Result Value Ref Range    Color Urine Yellow Colorless, Straw, Light Yellow, Yellow    Appearance Urine Clear Clear    Glucose Urine Negative Negative mg/dL    Bilirubin Urine Negative Negative    Ketones Urine Negative Negative mg/dL    Specific Gravity Urine 1.004 1.000 - 1.030    Blood Urine Negative Negative    pH Urine 7.5 5.0 - 9.0    Protein Albumin Urine Negative Negative mg/dL    Urobilinogen Urine Normal Normal, 2.0 mg/dL    Nitrite Urine Negative Negative    Leukocyte Esterase Urine Negative Negative    Narrative    Microscopic not indicated   Albumin Random Urine Quantitative with Creat Ratio     Status: None   Result Value Ref Range    Creatinine Urine mg/dL 18.9 mg/dL    Albumin Urine mg/L <12.0 mg/L    Albumin Urine mg/g Cr     T4 free     Status: Normal   Result Value Ref Range    Free T4 1.11 0.90 - 1.70 ng/dL      Aspects of Diabetes:   Recent Labs   Lab Test 09/07/23  0655 05/31/23  0649 02/10/23  0653   A1C 7.5* 7.3* 7.5*   LDL 63 72 68   HDL 45 45 51   TRIG 64 62 66   ALT 22 24 23   CR 0.88 0.90 0.93   GFRESTIMATED >90 >90 89   POTASSIUM 4.6 4.4 4.4   TSH 6.44* 5.77* 6.35*   T4 1.11 1.13 1.16   WBC 7.6 8.8 8.1   HGB 15.9 15.7 15.8    232 233   ALBUMIN 4.6 4.4 4.5      Hemoglobin A1c  Goal range is under 8%. Best is 6.5 to 7   Blood Pressure 164/96 Goal to keep less than 140/90   Tobacco  reports that he has never smoked. He has never used smokeless tobacco. Goal to abstain from tobacco   Aspirin or Plavix Anti-platelet therapy Aspirin or Plavix reduces risk of heart disease and stroke  -- sometimes used with other blood thinners, depending on bleeding risk and  risk factors.    ACE/ARB Specific blood pressure meds These medications can reduce risk of kidney disease   Cholesterol Statins (Lipitor, Crestor, vs others) Statins reduce risk of heart disease and stroke   Eye Exam -- Do Yearly -- Annual diabetic eye exam   Healthy weight Wt Readings from Last 4 Encounters:   09/07/23 88.5 kg (195 lb 3.2 oz)   05/31/23 87.4 kg (192 lb 9.6 oz)   02/10/23 87.1 kg (192 lb)   11/01/22 87.7 kg (193 lb 6.4 oz)      Body mass index is 28.75 kg/m .  Goal BMI under 30, best is under 25.      -- Trying to exercise daily (goal at least 20 min/day) with moderate aerobic activity   -- Eat healthy (resources from ADA at http://www.diabetes.org/)   -- Taking good care of my feet. Consider seeing the Podiatrist   -- Check blood sugars as directed, record in log book and bring to every appointment    Insurance companies are grading you and I on your blood sugar control -- Goal is to get your A1c down to 7.9% or lower and NO Smoking!  -- Medicare and most insurance companies, will only cover Hemoglobin A1c labs to be rechecked every 91+ days.      Return for Diabetes labs and clinic follow-up appointment every 3 to 4 months.    Schedule lab only appointment --- A few days AFTER: 12/6/23   Schedule clinic appointment with Dr. Wilson -- Same day as labs, or 1-2 days later.

## 2023-09-07 NOTE — NURSING NOTE
"Chief Complaint   Patient presents with    Diabetes       FOOD SECURITY SCREENING QUESTIONS  Hunger Vital Signs:  Within the past 12 months we worried whether our food would run out before we got money to buy more. Never  Within the past 12 months the food we bought just didn't last and we didn't have money to get more. Never  Beatris Nava LPN 9/7/2023 7:54 AM      Initial BP (!) 164/96 (BP Location: Right arm, Patient Position: Sitting, Cuff Size: Adult Regular)   Pulse 86   Temp 96.9  F (36.1  C) (Tympanic)   Resp 17   Ht 1.755 m (5' 9.09\")   Wt 88.5 kg (195 lb 3.2 oz)   SpO2 98%   BMI 28.75 kg/m   Estimated body mass index is 28.75 kg/m  as calculated from the following:    Height as of this encounter: 1.755 m (5' 9.09\").    Weight as of this encounter: 88.5 kg (195 lb 3.2 oz).  Medication Reconciliation: complete    Beatris Nava LPN    "

## 2023-10-06 ENCOUNTER — OFFICE VISIT (OUTPATIENT)
Dept: FAMILY MEDICINE | Facility: OTHER | Age: 68
End: 2023-10-06
Attending: FAMILY MEDICINE
Payer: MEDICARE

## 2023-10-06 VITALS
WEIGHT: 192 LBS | DIASTOLIC BLOOD PRESSURE: 95 MMHG | HEART RATE: 85 BPM | SYSTOLIC BLOOD PRESSURE: 160 MMHG | RESPIRATION RATE: 18 BRPM | TEMPERATURE: 96.9 F | BODY MASS INDEX: 27.49 KG/M2 | OXYGEN SATURATION: 98 % | HEIGHT: 70 IN

## 2023-10-06 DIAGNOSIS — Z79.4 CONTROLLED TYPE 2 DIABETES MELLITUS WITH COMPLICATION, WITH LONG-TERM CURRENT USE OF INSULIN (H): ICD-10-CM

## 2023-10-06 DIAGNOSIS — E11.8 CONTROLLED TYPE 2 DIABETES MELLITUS WITH COMPLICATION, WITH LONG-TERM CURRENT USE OF INSULIN (H): ICD-10-CM

## 2023-10-06 DIAGNOSIS — S30.861A TICK BITE OF ABDOMINAL WALL, INITIAL ENCOUNTER: Primary | ICD-10-CM

## 2023-10-06 DIAGNOSIS — Z23 NEEDS FLU SHOT: ICD-10-CM
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
## 2023-10-06 DIAGNOSIS — I10 BENIGN ESSENTIAL HYPERTENSION: ICD-10-CM

## 2023-10-06 PROCEDURE — 99213 OFFICE O/P EST LOW 20 MIN: CPT | Performed by: FAMILY MEDICINE

## 2023-10-06 PROCEDURE — 90662 IIV NO PRSV INCREASED AG IM: CPT

## 2023-10-06 PROCEDURE — G0463 HOSPITAL OUTPT CLINIC VISIT: HCPCS | Mod: 25

## 2023-10-06 RX ORDER — DOXYCYCLINE HYCLATE 100 MG
200 TABLET ORAL ONCE
Qty: 2 TABLET | Refills: 0 | Status: SHIPPED | OUTPATIENT
Start: 2023-10-06 | End: 2023-10-06

## 2023-10-06 ASSESSMENT — PAIN SCALES - GENERAL: PAINLEVEL: NO PAIN (0)

## 2023-10-06 NOTE — PROGRESS NOTES
"    Assessment & Plan       ICD-10-CM    1. Tick bite of abdominal wall, initial encounter  S30.861A doxycycline hyclate (VIBRA-TABS) 100 MG tablet    W57.XXXA       2. Needs flu shot  Z23 INFLUENZA VACCINE 65+ (FLUZONE HD)      3. Controlled type 2 diabetes mellitus with complication, with long-term current use of insulin (H)  E11.8     Z79.4       4. Benign essential hypertension  I10           Tick removed as noted below.  I do feel he is an appropriate candidate for Lyme prophylaxis and prescription for doxycycline 200 mg times single dose provided today.  Influenza vaccine updated today  Blood pressure not adequately controlled, reviewed with patient, follow-up with primary care provider.    PDMP Review       None            Prescription drug management             No follow-ups on file.    HANNA CRESPO MD  [facility]    Subjective   Oh Delarosa is a 68 year old male  presenting for the following health issues: Nursing Notes:   Corinna Alford  10/6/2023  9:25 AM  Signed  Chief Complaint   Patient presents with    Tick Removal     [employer]            Initial BP (!) 160/95 (BP Location: Right arm, Patient Position: Sitting, Cuff Size: Adult Regular)   Pulse 85   Temp 96.9  F (36.1  C) (Tympanic)   Resp 18   Ht 1.772 m (5' 9.75\")   Wt 87.1 kg (192 lb)   SpO2 98%   BMI 27.75 kg/m   Estimated body mass index is 27.75 kg/m  as calculated from the following:    Height as of this encounter: 1.772 m (5' 9.75\").    Weight as of this encounter: 87.1 kg (192 lb).     FOOD SECURITY SCREENING QUESTIONS:    The next two questions are to help us understand your food security.  If you are feeling you need any assistance in this area, we have resources available to support you today.    Hunger Vital Signs:  Within the past 12 months we worried whether our food would run out before we got money to buy more. Never  Within the past 12 months the food we bought just didn't last and we " "didn't have money to get more. Never      Corinna Alford                                HPI Oh Delarosa is a 68 year old male presents for evaluation of tick bite in his belly button.         Current Outpatient Medications   Medication    aspirin (ASA) 325 MG tablet    atorvastatin (LIPITOR) 40 MG tablet    blood glucose (NO BRAND SPECIFIED) lancets standard    blood glucose monitoring (NO BRAND SPECIFIED) meter device kit    Calcium Carb-Cholecalciferol (CALCIUM 500 +D PO)    Cholecalciferol (VITAMIN D3) 50 MCG (2000 UT) CAPS    Contour Next EZ (CONTOUR NEXT EZ W/DEVICE KIT) w/Device KIT    CONTOUR NEXT TEST test strip    glipiZIDE (GLUCOTROL) 10 MG tablet    insulin glargine (LANTUS SOLOSTAR) 100 UNIT/ML pen    insulin pen needle (cFares SAFEPACK PEN NEEDLE) 32G X 4 MM miscellaneous    losartan (COZAAR) 50 MG tablet    metFORMIN (GLUCOPHAGE) 1000 MG tablet    Microlet Lancets MISC     No current facility-administered medications for this visit.     Past Medical History:   Diagnosis Date    Actinic keratosis of right temple 12/17/2014    Other ehrlichiosis     11/2007    Unspecified injury of left wrist, hand and finger(s), initial encounter     scaphoid bruise               Review of Systems           11/6/2019     8:10 AM 2/6/2020     8:24 AM 3/18/2022     7:46 AM   PHQ   PHQ-9 Total Score 0 0 4   Q9: Thoughts of better off dead/self-harm past 2 weeks Not at all Not at all Not at all         11/6/2019     8:10 AM 2/6/2020     8:24 AM 3/18/2022     7:46 AM   SHIRLENE-7 SCORE   Total Score   0 (minimal anxiety)   Total Score 0 0 0             Objective  BP (!) 160/95 (BP Location: Right arm, Patient Position: Sitting, Cuff Size: Adult Regular)   Pulse 85   Temp 96.9  F (36.1  C) (Tympanic)   Resp 18   Ht 1.772 m (5' 9.75\")   Wt 87.1 kg (192 lb)   SpO2 98%   BMI 27.75 kg/m     Physical Exam   GENERAL: healthy, alert and no distress  In his umbilicus is a small tic/foreign body.  Area was cleansed with " alcohol swab, then take grabbed with forceps, removed.            Answers submitted by the patient for this visit:  General Questionnaire (Submitted on 10/6/2023)  Chief Complaint: Chronic problems general questions HPI Form  What is the reason for your visit today? : wood tick embedded  How many servings of fruits and vegetables do you eat daily?: 4 or more  On average, how many sweetened beverages do you drink each day (Examples: soda, juice, sweet tea, etc.  Do NOT count diet or artificially sweetened beverages)?: 1  How many minutes a day do you exercise enough to make your heart beat faster?: 60 or more  How many days a week do you exercise enough to make your heart beat faster?: 7  How many days per week do you miss taking your medication?: 0

## 2023-10-06 NOTE — PROGRESS NOTES
"  {PROVIDER CHARTING PREFERENCE:759106}    Subjective   Vicente is a 68 year old, presenting for the following health issues:  Tick Removal ([employer] )      10/6/2023     9:20 AM   Additional Questions   Roomed by Corinna   Accompanied by self       History of Present Illness       Reason for visit:  Wood tick embedded    He eats 4 or more servings of fruits and vegetables daily.He consumes 1 sweetened beverage(s) daily.He exercises with enough effort to increase his heart rate 60 or more minutes per day.  He exercises with enough effort to increase his heart rate 7 days per week.   He is taking medications regularly.       {MA/LPN/RN Pre-Provider Visit Orders- hCG/UA/Strep (Optional):142183}  {SUPERLIST (Optional):334824}  {additonal problems for provider to add (Optional):364582}      Review of Systems   {ROS COMP (Optional):539059}      Objective    BP (!) 160/95 (BP Location: Right arm, Patient Position: Sitting, Cuff Size: Adult Regular)   Pulse 85   Temp 96.9  F (36.1  C) (Tympanic)   Resp 18   Ht 1.772 m (5' 9.75\")   Wt 87.1 kg (192 lb)   SpO2 98%   BMI 27.75 kg/m    Body mass index is 27.75 kg/m .  Physical Exam   {Exam List (Optional):224765}    {Diagnostic Test Results (Optional):614253}    {AMBULATORY ATTESTATION (Optional):889086}              "

## 2023-10-06 NOTE — NURSING NOTE
"Chief Complaint   Patient presents with    Tick Removal     [employer]            Initial BP (!) 160/95 (BP Location: Right arm, Patient Position: Sitting, Cuff Size: Adult Regular)   Pulse 85   Temp 96.9  F (36.1  C) (Tympanic)   Resp 18   Ht 1.772 m (5' 9.75\")   Wt 87.1 kg (192 lb)   SpO2 98%   BMI 27.75 kg/m   Estimated body mass index is 27.75 kg/m  as calculated from the following:    Height as of this encounter: 1.772 m (5' 9.75\").    Weight as of this encounter: 87.1 kg (192 lb).     FOOD SECURITY SCREENING QUESTIONS:    The next two questions are to help us understand your food security.  If you are feeling you need any assistance in this area, we have resources available to support you today.    Hunger Vital Signs:  Within the past 12 months we worried whether our food would run out before we got money to buy more. Never  Within the past 12 months the food we bought just didn't last and we didn't have money to get more. Never      Corinna Alford     "

## 2023-10-24 DIAGNOSIS — Z79.4 CONTROLLED TYPE 2 DIABETES MELLITUS WITH COMPLICATION, WITH LONG-TERM CURRENT USE OF INSULIN (H): ICD-10-CM

## 2023-10-24 DIAGNOSIS — E11.8 CONTROLLED TYPE 2 DIABETES MELLITUS WITH COMPLICATION, WITH LONG-TERM CURRENT USE OF INSULIN (H): ICD-10-CM

## 2023-10-30 NOTE — TELEPHONE ENCOUNTER
[facility] sent Rx request for the following:      Requested Prescriptions   Pending Prescriptions Disp Refills    CONTOUR NEXT TEST test strip [Pharmacy Med Name: CONTOUR NEXT TEST STRIP] 400 strip 3     Sig: USE TO TEST BLOOD SUGAR 4 TIMES DAILY.   Passed RN refill protocol    Last Prescription Date:   8/1/22  Last Fill Qty/Refills:         400, R-3    Last Office Visit:              10/6/23   Future Office visit:           1/9/24    Micki Dumont RN on 10/30/2023 at 6:07 PM

## 2023-11-02 DIAGNOSIS — Z79.4 CONTROLLED TYPE 2 DIABETES MELLITUS WITH COMPLICATION, WITH LONG-TERM CURRENT USE OF INSULIN (H): ICD-10-CM

## 2023-11-02 DIAGNOSIS — E11.8 CONTROLLED TYPE 2 DIABETES MELLITUS WITH COMPLICATION, WITH LONG-TERM CURRENT USE OF INSULIN (H): ICD-10-CM

## 2023-11-03 NOTE — TELEPHONE ENCOUNTER
[facility] sent Rx request for the following:      Requested Prescriptions   Pending Prescriptions Disp Refills    blood glucose (CONTOUR NEXT TEST) test strip 400 strip 3     Sig: USE TO TEST BLOOD SUGAR 4 TIMES DAILY.       Diabetic Supplies Protocol Passed - 11/2/2023  3:11 PM   Last Prescription Date:   10/30/23  Last Fill Qty/Refills:         400, R-3    Last Office Visit:              10/6/23   Future Office visit:           1/9/23    Redundant refill request refused: Too soon:  Leatha Valderrama RN on 11/3/2023 at 2:47 PM

## 2023-12-21 DIAGNOSIS — E11.8 CONTROLLED TYPE 2 DIABETES MELLITUS WITH COMPLICATION, WITH LONG-TERM CURRENT USE OF INSULIN (H): ICD-10-CM

## 2023-12-21 DIAGNOSIS — Z79.4 CONTROLLED TYPE 2 DIABETES MELLITUS WITH COMPLICATION, WITH LONG-TERM CURRENT USE OF INSULIN (H): ICD-10-CM

## 2023-12-26 RX ORDER — FLURBIPROFEN SODIUM 0.3 MG/ML
SOLUTION/ DROPS OPHTHALMIC DAILY
Qty: 200 EACH | Refills: 4 | Status: SHIPPED | OUTPATIENT
Start: 2023-12-26

## 2023-12-26 NOTE — TELEPHONE ENCOUNTER
"[id number] sent Rx request for the following:      Requested Prescriptions   Pending Prescriptions Disp Refills    ULTIGUARD SAFEPACK PEN NEEDLE 32G X 4 MM miscellaneous [Pharmacy Med Name: ULTICARE PEN NEEDLES 4MM]  4     Sig: INJECT SUBCUTANEOUS DAILY       Diabetic Supplies Protocol Passed - 12/21/2023  6:44 AM        Passed - Medication is active on med list        Passed - Patient is 18 years of age or older        Passed - Recent (6 mo) or future (30 days) visit within the authorizing provider's specialty     Patient had office visit in the last 6 months or has a visit in the next 30 days with authorizing provider.  See \"Patient Info\" tab in inbasket, or \"Choose Columns\" in Meds & Orders section of the refill encounter.                 Last Prescription Date:   11/1/22  Last Fill Qty/Refills:         200, R-4    Last Office Visit:              10/6/23   Future Office visit:           1/9/24    Prescription approved per [facility] Refill Protocol.  Jesse Daniels RN on 12/26/2023 at 3:28 PM      "

## 2024-01-03 ENCOUNTER — TELEPHONE (OUTPATIENT)
Dept: INTERNAL MEDICINE | Facility: OTHER | Age: 69
End: 2024-01-03
Payer: COMMERCIAL

## 2024-01-03 DIAGNOSIS — E11.8 CONTROLLED TYPE 2 DIABETES MELLITUS WITH COMPLICATION, WITH LONG-TERM CURRENT USE OF INSULIN (H): Primary | ICD-10-CM

## 2024-01-03 DIAGNOSIS — Z79.4 CONTROLLED TYPE 2 DIABETES MELLITUS WITH COMPLICATION, WITH LONG-TERM CURRENT USE OF INSULIN (H): Primary | ICD-10-CM

## 2024-01-03 DIAGNOSIS — Z12.5 ENCOUNTER FOR SCREENING FOR MALIGNANT NEOPLASM OF PROSTATE: ICD-10-CM

## 2024-01-09 ENCOUNTER — LAB (OUTPATIENT)
Dept: LAB | Facility: OTHER | Age: 69
End: 2024-01-09
Attending: INTERNAL MEDICINE
Payer: MEDICARE

## 2024-01-09 ENCOUNTER — OFFICE VISIT (OUTPATIENT)
Dept: INTERNAL MEDICINE | Facility: OTHER | Age: 69
End: 2024-01-09
Attending: INTERNAL MEDICINE
Payer: MEDICARE

## 2024-01-09 VITALS
HEIGHT: 70 IN | RESPIRATION RATE: 18 BRPM | SYSTOLIC BLOOD PRESSURE: 138 MMHG | BODY MASS INDEX: 28.06 KG/M2 | DIASTOLIC BLOOD PRESSURE: 88 MMHG | HEART RATE: 85 BPM | WEIGHT: 196 LBS | TEMPERATURE: 97.6 F

## 2024-01-09 DIAGNOSIS — Z12.5 ENCOUNTER FOR SCREENING FOR MALIGNANT NEOPLASM OF PROSTATE: ICD-10-CM

## 2024-01-09 DIAGNOSIS — Z98.890 S/P SUBTOTAL PARATHYROIDECTOMY: ICD-10-CM

## 2024-01-09 DIAGNOSIS — I10 BENIGN ESSENTIAL HYPERTENSION: ICD-10-CM

## 2024-01-09 DIAGNOSIS — E11.8 CONTROLLED TYPE 2 DIABETES MELLITUS WITH COMPLICATION, WITH LONG-TERM CURRENT USE OF INSULIN (H): ICD-10-CM

## 2024-01-09 DIAGNOSIS — E11.3292 MILD NONPROLIFERATIVE DIABETIC RETINOPATHY OF LEFT EYE WITHOUT MACULAR EDEMA ASSOCIATED WITH TYPE 2 DIABETES MELLITUS (H): ICD-10-CM

## 2024-01-09 DIAGNOSIS — Z90.89 S/P SUBTOTAL PARATHYROIDECTOMY: ICD-10-CM

## 2024-01-09 DIAGNOSIS — Z79.4 CONTROLLED TYPE 2 DIABETES MELLITUS WITH COMPLICATION, WITH LONG-TERM CURRENT USE OF INSULIN (H): Primary | ICD-10-CM

## 2024-01-09 DIAGNOSIS — E11.8 CONTROLLED TYPE 2 DIABETES MELLITUS WITH COMPLICATION, WITH LONG-TERM CURRENT USE OF INSULIN (H): Primary | ICD-10-CM

## 2024-01-09 DIAGNOSIS — Z79.4 CONTROLLED TYPE 2 DIABETES MELLITUS WITH COMPLICATION, WITH LONG-TERM CURRENT USE OF INSULIN (H): ICD-10-CM

## 2024-01-09 DIAGNOSIS — N18.2 CKD (CHRONIC KIDNEY DISEASE) STAGE 2, GFR 60-89 ML/MIN: ICD-10-CM

## 2024-01-09 DIAGNOSIS — E78.2 MIXED HYPERLIPIDEMIA: ICD-10-CM

## 2024-01-09 LAB
ANION GAP SERPL CALCULATED.3IONS-SCNC: 9 MMOL/L (ref 7–15)
BASOPHILS # BLD AUTO: 0.1 10E3/UL (ref 0–0.2)
BASOPHILS NFR BLD AUTO: 1 %
BUN SERPL-MCNC: 17.3 MG/DL (ref 8–23)
CALCIUM SERPL-MCNC: 10.2 MG/DL (ref 8.8–10.2)
CHLORIDE SERPL-SCNC: 101 MMOL/L (ref 98–107)
CHOLEST SERPL-MCNC: 134 MG/DL
CREAT SERPL-MCNC: 0.96 MG/DL (ref 0.67–1.17)
DEPRECATED HCO3 PLAS-SCNC: 27 MMOL/L (ref 22–29)
EGFRCR SERPLBLD CKD-EPI 2021: 86 ML/MIN/1.73M2
EOSINOPHIL # BLD AUTO: 0.2 10E3/UL (ref 0–0.7)
EOSINOPHIL NFR BLD AUTO: 3 %
ERYTHROCYTE [DISTWIDTH] IN BLOOD BY AUTOMATED COUNT: 13.2 % (ref 10–15)
FASTING STATUS PATIENT QL REPORTED: YES
GLUCOSE SERPL-MCNC: 74 MG/DL (ref 70–99)
HBA1C MFR BLD: 7.4 % (ref 4–6.2)
HCT VFR BLD AUTO: 45.1 % (ref 40–53)
HDLC SERPL-MCNC: 49 MG/DL
HGB BLD-MCNC: 15.6 G/DL (ref 13.3–17.7)
IMM GRANULOCYTES # BLD: 0 10E3/UL
IMM GRANULOCYTES NFR BLD: 0 %
LDLC SERPL CALC-MCNC: 75 MG/DL
LYMPHOCYTES # BLD AUTO: 3.3 10E3/UL (ref 0.8–5.3)
LYMPHOCYTES NFR BLD AUTO: 41 %
MCH RBC QN AUTO: 30.1 PG (ref 26.5–33)
MCHC RBC AUTO-ENTMCNC: 34.6 G/DL (ref 31.5–36.5)
MCV RBC AUTO: 87 FL (ref 78–100)
MONOCYTES # BLD AUTO: 0.7 10E3/UL (ref 0–1.3)
MONOCYTES NFR BLD AUTO: 8 %
NEUTROPHILS # BLD AUTO: 3.9 10E3/UL (ref 1.6–8.3)
NEUTROPHILS NFR BLD AUTO: 47 %
NONHDLC SERPL-MCNC: 85 MG/DL
NRBC # BLD AUTO: 0 10E3/UL
NRBC BLD AUTO-RTO: 0 /100
PLATELET # BLD AUTO: 240 10E3/UL (ref 150–450)
POTASSIUM SERPL-SCNC: 4.8 MMOL/L (ref 3.4–5.3)
PSA SERPL DL<=0.01 NG/ML-MCNC: 1.79 NG/ML (ref 0–4.5)
RBC # BLD AUTO: 5.18 10E6/UL (ref 4.4–5.9)
SODIUM SERPL-SCNC: 137 MMOL/L (ref 135–145)
T4 FREE SERPL-MCNC: 1.12 NG/DL (ref 0.9–1.7)
TRIGL SERPL-MCNC: 50 MG/DL
TSH SERPL DL<=0.005 MIU/L-ACNC: 6.15 UIU/ML (ref 0.3–4.2)
WBC # BLD AUTO: 8.2 10E3/UL (ref 4–11)

## 2024-01-09 PROCEDURE — 84439 ASSAY OF FREE THYROXINE: CPT | Mod: ZL

## 2024-01-09 PROCEDURE — 36415 COLL VENOUS BLD VENIPUNCTURE: CPT | Mod: ZL

## 2024-01-09 PROCEDURE — G0463 HOSPITAL OUTPT CLINIC VISIT: HCPCS

## 2024-01-09 PROCEDURE — 80061 LIPID PANEL: CPT | Mod: ZL

## 2024-01-09 PROCEDURE — 84443 ASSAY THYROID STIM HORMONE: CPT | Mod: ZL

## 2024-01-09 PROCEDURE — 99214 OFFICE O/P EST MOD 30 MIN: CPT | Performed by: INTERNAL MEDICINE

## 2024-01-09 PROCEDURE — 83036 HEMOGLOBIN GLYCOSYLATED A1C: CPT | Mod: ZL

## 2024-01-09 PROCEDURE — G0103 PSA SCREENING: HCPCS | Mod: ZL

## 2024-01-09 PROCEDURE — 80048 BASIC METABOLIC PNL TOTAL CA: CPT | Mod: ZL

## 2024-01-09 PROCEDURE — 85025 COMPLETE CBC W/AUTO DIFF WBC: CPT | Mod: ZL

## 2024-01-09 RX ORDER — INSULIN GLARGINE 100 [IU]/ML
INJECTION, SOLUTION SUBCUTANEOUS
Qty: 15 ML | Refills: 1 | Status: SHIPPED | OUTPATIENT
Start: 2024-01-09 | End: 2024-04-09

## 2024-01-09 RX ORDER — LOSARTAN POTASSIUM 100 MG/1
100 TABLET ORAL DAILY
Qty: 90 TABLET | Refills: 1 | Status: SHIPPED | OUTPATIENT
Start: 2024-01-09 | End: 2024-04-09

## 2024-01-09 RX ORDER — ATORVASTATIN CALCIUM 40 MG/1
40 TABLET, FILM COATED ORAL DAILY
Qty: 90 TABLET | Refills: 1 | Status: SHIPPED | OUTPATIENT
Start: 2024-01-09 | End: 2024-04-09

## 2024-01-09 RX ORDER — GLIPIZIDE 10 MG/1
TABLET ORAL
Qty: 270 TABLET | Refills: 1 | Status: SHIPPED | OUTPATIENT
Start: 2024-01-09 | End: 2024-04-09

## 2024-01-09 ASSESSMENT — ENCOUNTER SYMPTOMS
EYE PAIN: 0
HEMATURIA: 0
PALPITATIONS: 0
AGITATION: 0
MYALGIAS: 0
FEVER: 0
COUGH: 0
LIGHT-HEADEDNESS: 0
CHILLS: 0
ABDOMINAL PAIN: 0
DIZZINESS: 0
NAUSEA: 0
VOMITING: 0
CONFUSION: 0
NUMBNESS: 1
DIARRHEA: 0
WHEEZING: 0
DYSURIA: 0
ARTHRALGIAS: 0
SHORTNESS OF BREATH: 0
FATIGUE: 0
BRUISES/BLEEDS EASILY: 0

## 2024-01-09 NOTE — PATIENT INSTRUCTIONS
Blood pressure is somewhat elevated.     -- Increase losartan up to 100 mg daily.    Diabetes is well controlled.     Medications refilled.   Labs are stable.    Results for orders placed or performed in visit on 01/09/24   Lipid Panel     Status: None   Result Value Ref Range    Cholesterol 134 <200 mg/dL    Triglycerides 50 <150 mg/dL    Direct Measure HDL 49 >=40 mg/dL    LDL Cholesterol Calculated 75 <=100 mg/dL    Non HDL Cholesterol 85 <130 mg/dL    Patient Fasting > 8hrs? Yes     Narrative    Cholesterol  Desirable:  <200 mg/dL    Triglycerides  Normal:  Less than 150 mg/dL  Borderline High:  150-199 mg/dL  High:  200-499 mg/dL  Very High:  Greater than or equal to 500 mg/dL    Direct Measure HDL  Female:  Greater than or equal to 50 mg/dL   Male:  Greater than or equal to 40 mg/dL    LDL Cholesterol  Desirable:  <100mg/dL  Above Desirable:  100-129 mg/dL   Borderline High:  130-159 mg/dL   High:  160-189 mg/dL   Very High:  >= 190 mg/dL    Non HDL Cholesterol  Desirable:  130 mg/dL  Above Desirable:  130-159 mg/dL  Borderline High:  160-189 mg/dL  High:  190-219 mg/dL  Very High:  Greater than or equal to 220 mg/dL   Hemoglobin A1c     Status: Abnormal   Result Value Ref Range    Hemoglobin A1C 7.4 (H) 4.0 - 6.2 %   TSH with free T4 reflex     Status: Abnormal   Result Value Ref Range    TSH 6.15 (H) 0.30 - 4.20 uIU/mL   Basic Metabolic Panel     Status: Normal   Result Value Ref Range    Sodium 137 135 - 145 mmol/L    Potassium 4.8 3.4 - 5.3 mmol/L    Chloride 101 98 - 107 mmol/L    Carbon Dioxide (CO2) 27 22 - 29 mmol/L    Anion Gap 9 7 - 15 mmol/L    Urea Nitrogen 17.3 8.0 - 23.0 mg/dL    Creatinine 0.96 0.67 - 1.17 mg/dL    GFR Estimate 86 >60 mL/min/1.73m2    Calcium 10.2 8.8 - 10.2 mg/dL    Glucose 74 70 - 99 mg/dL   PSA Screen GH     Status: Normal   Result Value Ref Range    Prostate Specific Antigen Screen 1.79 0.00 - 4.50 ng/mL    Narrative    This result is obtained using the Roche Elecsys total  PSA method on the chela e601 immunoassay analyzer. Results obtained with different assay methods or kits cannot be used interchangeably.   CBC with platelets and differential     Status: None   Result Value Ref Range    WBC Count 8.2 4.0 - 11.0 10e3/uL    RBC Count 5.18 4.40 - 5.90 10e6/uL    Hemoglobin 15.6 13.3 - 17.7 g/dL    Hematocrit 45.1 40.0 - 53.0 %    MCV 87 78 - 100 fL    MCH 30.1 26.5 - 33.0 pg    MCHC 34.6 31.5 - 36.5 g/dL    RDW 13.2 10.0 - 15.0 %    Platelet Count 240 150 - 450 10e3/uL    % Neutrophils 47 %    % Lymphocytes 41 %    % Monocytes 8 %    % Eosinophils 3 %    % Basophils 1 %    % Immature Granulocytes 0 %    NRBCs per 100 WBC 0 <1 /100    Absolute Neutrophils 3.9 1.6 - 8.3 10e3/uL    Absolute Lymphocytes 3.3 0.8 - 5.3 10e3/uL    Absolute Monocytes 0.7 0.0 - 1.3 10e3/uL    Absolute Eosinophils 0.2 0.0 - 0.7 10e3/uL    Absolute Basophils 0.1 0.0 - 0.2 10e3/uL    Absolute Immature Granulocytes 0.0 <=0.4 10e3/uL    Absolute NRBCs 0.0 10e3/uL   CBC with Platelets & Differential     Status: None    Narrative    The following orders were created for panel order CBC with Platelets & Differential.  Procedure                               Abnormality         Status                     ---------                               -----------         ------                     CBC with platelets and d...[[id number]]                      Final result                 Please view results for these tests on the individual orders.      Aspects of Diabetes:   Recent Labs   Lab Test 01/09/24  0644 09/07/23  0655 05/31/23  0649 02/10/23  0653   A1C 7.4* 7.5* 7.3* 7.5*   LDL 75 63 72 68   HDL 49 45 45 51   TRIG 50 64 62 66   ALT  --  22 24 23   CR 0.96 0.88 0.90 0.93   GFRESTIMATED 86 >90 >90 89   POTASSIUM 4.8 4.6 4.4 4.4   TSH 6.15* 6.44* 5.77* 6.35*   T4  --  1.11 1.13 1.16   WBC 8.2 7.6 8.8 8.1   HGB 15.6 15.9 15.7 15.8    221 232 233   ALBUMIN  --  4.6 4.4 4.5      Hemoglobin A1c  Goal range is under 8%.  Best is 6.5 to 7   Blood Pressure 138/88 Goal to keep less than 140/90   Tobacco  reports that he has never smoked. He has never used smokeless tobacco. Goal to abstain from tobacco   Aspirin or Plavix Anti-platelet therapy Aspirin or Plavix reduces risk of heart disease and stroke  -- sometimes used with other blood thinners, depending on bleeding risk and risk factors.    ACE/ARB Specific blood pressure meds These medications can reduce risk of kidney disease   Cholesterol Statins (Lipitor, Crestor, vs others) Statins reduce risk of heart disease and stroke   Eye Exam -- Do Yearly -- Annual diabetic eye exam   Healthy weight Wt Readings from Last 4 Encounters:   01/09/24 88.9 kg (196 lb)   10/06/23 87.1 kg (192 lb)   09/07/23 88.5 kg (195 lb 3.2 oz)   05/31/23 87.4 kg (192 lb 9.6 oz)      Body mass index is 28.53 kg/m .  Goal BMI under 30, best is under 25.      -- Trying to exercise daily (goal at least 20 min/day) with moderate aerobic activity   -- Eat healthy (resources from ADA at http://www.diabetes.org/)   -- Taking good care of my feet. Consider seeing the Podiatrist   -- Check blood sugars as directed, record in log book and bring to every appointment    Insurance companies are grading you and I on your blood sugar control -- Goal is to get your A1c down to 7.9% or lower and NO Smoking!  -- Medicare and most insurance companies, will only cover Hemoglobin A1c labs to be rechecked every 91+ days.      Return for Diabetes labs and clinic follow-up appointment every 3 to 4 months.    Schedule lab only appointment --- A few days AFTER: 4/8/24   Schedule clinic appointment with Dr. Wilson -- Same day as labs, or 1-2 days later.

## 2024-01-09 NOTE — NURSING NOTE
"Chief Complaint   Patient presents with    Diabetes       Initial There were no vitals taken for this visit. Estimated body mass index is 27.75 kg/m  as calculated from the following:    Height as of 10/6/23: 1.772 m (5' 9.75\").    Weight as of 10/6/23: 87.1 kg (192 lb).  Medication Review: complete    The next two questions are to help us understand your food security.  If you are feeling you need any assistance in this area, we have resources available to support you today.          1/9/2024   SDOH- Food Insecurity   Within the past 12 months, did you worry that your food would run out before you got money to buy more? N   Within the past 12 months, did the food you bought just not last and you didn t have money to get more? N         Health Care Directive:  Patient has a Health Care Directive on file      Primo Sheth      "

## 2024-01-09 NOTE — PROGRESS NOTES
Assessment & Plan     ICD-10-CM    1. Controlled type 2 diabetes mellitus with complication, with long-term current use of insulin (H)  E11.8 glipiZIDE (GLUCOTROL) 10 MG tablet    Z79.4 insulin glargine (LANTUS SOLOSTAR) 100 UNIT/ML pen     metFORMIN (GLUCOPHAGE) 1000 MG tablet      2. S/P subtotal parathyroidectomy (H24)  E89.2       3. Benign essential hypertension  I10 losartan (COZAAR) 100 MG tablet      4. CKD (chronic kidney disease) stage 2, GFR 60-89 ml/min  N18.2       5. Mixed hyperlipidemia  E78.2 atorvastatin (LIPITOR) 40 MG tablet      6. Mild nonproliferative diabetic retinopathy of left eye without macular edema associated with type 2 diabetes mellitus (H)  E11.3292         Patient presents for diabetes follow-up as well as follow-up multiple issues.    History of subtotal parathyroidectomy, calcium levels are currently within normal limits at this time.  Continues with routine lab work.    Nonproliferative diabetic retinopathy of the left eye.  Continues to follow with ophthalmology on a regular basis.    HYPERTENSION - Ongoing. Blood pressure is currently HIGH.  Medication side effects: None. Denies syncope or presyncope.    - Increase Losartan to 100 mg daily.   Medication list reviewed/updated. Refills completed as needed.      MIXED HYPERLIPIDEMIA.  Ongoing. LDL is at goal: No. Triglycerides are at goal: Yes.  Hopefully lifestyle modifications will improve cholesterol levels, otherwise we will need to consider additional medication dose adjustments or medication changes.  Medication side effects reported: None.   Continue current medications for now. Medication list reviewed/updated. Refills completed as needed.  Recent Labs   Lab Test 01/09/24  0644 09/07/23  0655   CHOL 134 121   HDL 49 45   LDL 75 63   TRIG 50 64        Chronic Kidney Disease, Stage 2 (GFR 60-89), chronic, ongoing.  Kidney function had been slowly declining.  Encouraged NSAID avoidance.       Vaccine counseling completed.   Encouraged routine / annual vaccinations.    Type 2 Diabetes Mellitus, Reports ongoing/previous: numbness and burning.  Blood sugar control has been good with minimal hyperglycemia. Doing well with diet, oral agents, exercise, and insulin injections.  Medication list reviewed/updated. Refills completed as needed.    Complicating factors include but are not limited to: hypertension, hyperlipidemia, neuropathy, and chronic kidney disease.     Recent Labs   Lab Test 01/09/24  0644 09/07/23  0655 05/31/23  0649 02/10/23  0653   A1C 7.4* 7.5* 7.3* 7.5*   LDL 75 63 72 68   HDL 49 45 45 51   TRIG 50 64 62 66   ALT  --  22 24 23   CR 0.96 0.88 0.90 0.93   GFRESTIMATED 86 >90 >90 89   POTASSIUM 4.8 4.6 4.4 4.4   TSH 6.15* 6.44* 5.77* 6.35*   T4 1.12 1.11 1.13 1.16   WBC 8.2 7.6 8.8 8.1   HGB 15.6 15.9 15.7 15.8    221 232 233   ALBUMIN  --  4.6 4.4 4.5     Hemoglobin A1c is down slightly.  Increased dose of glipizide up to 40 mg daily has helped.  LDL is high and not at goal.  HDL and triglycerides are at goal.  Creatinine has worsened slightly.  TSH is elevated, last free T4 was within normal limits.  CBC normal.        Return in about 3 months (around 4/9/2024) for - Labs every 91+ days, with DM Follow-up, Same Day or 1-2 days later with Dr. Wilson.    Moshe Wilson MD  [facility] AND [facility]    Kendla Zhang is a 68 year old, presenting for the following health issues:  Diabetes        1/9/2024     7:56 AM   Additional Questions   Roomed by Primo MICHEL / SHAKIRA   Accompanied by n/a       History of Present Illness       Diabetes:   He presents for follow up of diabetes.  He is checking home blood glucose four or more times daily.   He checks blood glucose before and after meals and at bedtime.  Blood glucose is never over 200 and sometimes under 70. He is aware of hypoglycemia symptoms including shakiness, blurred vision and weakness.    He has no concerns regarding his diabetes at this time.    The  "patient has had a diabetic eye exam in the last 12 months. Eye exam performed on 04/01/2023. Location of last eye exam [facility].        He eats 4 or more servings of fruits and vegetables daily.He consumes 1 sweetened beverage(s) daily.He exercises with enough effort to increase his heart rate 60 or more minutes per day.  He exercises with enough effort to increase his heart rate 7 days per week.   He is taking medications regularly.     Review of Systems   Constitutional:  Negative for chills, fatigue and fever.   HENT:  Negative for congestion and hearing loss.    Eyes:  Negative for pain and visual disturbance.   Respiratory:  Negative for cough, shortness of breath and wheezing.    Cardiovascular:  Negative for chest pain and palpitations.   Gastrointestinal:  Negative for abdominal pain, diarrhea, nausea and vomiting.   Endocrine: Negative for cold intolerance and heat intolerance.   Genitourinary:  Negative for dysuria and hematuria.   Musculoskeletal:  Negative for arthralgias and myalgias.        Right 4th finger, in palm. starting to develop trigger finger, improved with regular home therapy   Skin:  Negative for pallor.   Allergic/Immunologic: Negative for immunocompromised state.   Neurological:  Positive for numbness (+ bilateral feet, comes and goes -- better with better glucose control). Negative for dizziness and light-headedness.   Hematological:  Does not bruise/bleed easily.   Psychiatric/Behavioral:  Negative for agitation and confusion.             Objective    /88   Pulse 85   Temp 97.6  F (36.4  C)   Resp 18   Ht 1.765 m (5' 9.5\")   Wt 88.9 kg (196 lb)   BMI 28.53 kg/m    Body mass index is 28.53 kg/m .  Physical Exam  Constitutional:       General: He is not in acute distress.     Appearance: He is well-developed. He is not diaphoretic.   HENT:      Head: Normocephalic and atraumatic.   Eyes:      General: No scleral icterus.     Conjunctiva/sclera: Conjunctivae normal. "   Neck:      Vascular: No carotid bruit.   Cardiovascular:      Rate and Rhythm: Normal rate and regular rhythm.      Pulses: Normal pulses.   Pulmonary:      Effort: Pulmonary effort is normal.      Breath sounds: Normal breath sounds.   Abdominal:      Palpations: Abdomen is soft.      Tenderness: There is no abdominal tenderness.   Musculoskeletal:         General: No tenderness or deformity.      Cervical back: Neck supple.      Right lower leg: No edema.      Left lower leg: No edema.   Lymphadenopathy:      Cervical: No cervical adenopathy.   Skin:     General: Skin is warm and dry.      Findings: No rash.      Comments: Start of contracture of right hand - 4th finger.   Neurological:      Mental Status: He is alert. Mental status is at baseline.   Psychiatric:         Mood and Affect: Mood normal.         Behavior: Behavior normal.

## 2024-04-05 ENCOUNTER — DOCUMENTATION ONLY (OUTPATIENT)
Dept: LAB | Facility: OTHER | Age: 69
End: 2024-04-05
Payer: COMMERCIAL

## 2024-04-05 DIAGNOSIS — N18.2 CKD (CHRONIC KIDNEY DISEASE) STAGE 2, GFR 60-89 ML/MIN: ICD-10-CM

## 2024-04-05 DIAGNOSIS — E11.8 CONTROLLED TYPE 2 DIABETES MELLITUS WITH COMPLICATION, WITH LONG-TERM CURRENT USE OF INSULIN (H): Primary | ICD-10-CM

## 2024-04-05 DIAGNOSIS — E78.2 MIXED HYPERLIPIDEMIA: ICD-10-CM

## 2024-04-05 DIAGNOSIS — Z79.4 CONTROLLED TYPE 2 DIABETES MELLITUS WITH COMPLICATION, WITH LONG-TERM CURRENT USE OF INSULIN (H): Primary | ICD-10-CM

## 2024-04-10 ENCOUNTER — OFFICE VISIT (OUTPATIENT)
Dept: INTERNAL MEDICINE | Facility: OTHER | Age: 69
End: 2024-04-10
Attending: INTERNAL MEDICINE
Payer: MEDICARE

## 2024-04-10 ENCOUNTER — LAB (OUTPATIENT)
Dept: LAB | Facility: OTHER | Age: 69
End: 2024-04-10
Attending: INTERNAL MEDICINE
Payer: COMMERCIAL

## 2024-04-10 VITALS
BODY MASS INDEX: 27.84 KG/M2 | OXYGEN SATURATION: 96 % | RESPIRATION RATE: 18 BRPM | DIASTOLIC BLOOD PRESSURE: 84 MMHG | HEART RATE: 93 BPM | WEIGHT: 194.5 LBS | SYSTOLIC BLOOD PRESSURE: 138 MMHG | TEMPERATURE: 97.2 F | HEIGHT: 70 IN

## 2024-04-10 DIAGNOSIS — E11.8 CONTROLLED TYPE 2 DIABETES MELLITUS WITH COMPLICATION, WITH LONG-TERM CURRENT USE OF INSULIN (H): Primary | ICD-10-CM

## 2024-04-10 DIAGNOSIS — I10 BENIGN ESSENTIAL HYPERTENSION: ICD-10-CM

## 2024-04-10 DIAGNOSIS — E78.2 MIXED HYPERLIPIDEMIA: ICD-10-CM

## 2024-04-10 DIAGNOSIS — E11.42 DIABETIC POLYNEUROPATHY ASSOCIATED WITH TYPE 2 DIABETES MELLITUS (H): ICD-10-CM

## 2024-04-10 DIAGNOSIS — Z79.4 CONTROLLED TYPE 2 DIABETES MELLITUS WITH COMPLICATION, WITH LONG-TERM CURRENT USE OF INSULIN (H): ICD-10-CM

## 2024-04-10 DIAGNOSIS — E11.8 CONTROLLED TYPE 2 DIABETES MELLITUS WITH COMPLICATION, WITH LONG-TERM CURRENT USE OF INSULIN (H): ICD-10-CM

## 2024-04-10 DIAGNOSIS — N18.2 CKD (CHRONIC KIDNEY DISEASE) STAGE 2, GFR 60-89 ML/MIN: ICD-10-CM

## 2024-04-10 DIAGNOSIS — Z71.85 VACCINE COUNSELING: ICD-10-CM

## 2024-04-10 DIAGNOSIS — Z79.4 CONTROLLED TYPE 2 DIABETES MELLITUS WITH COMPLICATION, WITH LONG-TERM CURRENT USE OF INSULIN (H): Primary | ICD-10-CM

## 2024-04-10 LAB
ALBUMIN SERPL BCG-MCNC: 4.4 G/DL (ref 3.5–5.2)
ALBUMIN UR-MCNC: NEGATIVE MG/DL
ALP SERPL-CCNC: 99 U/L (ref 40–150)
ALT SERPL W P-5'-P-CCNC: 21 U/L (ref 0–70)
ANION GAP SERPL CALCULATED.3IONS-SCNC: 10 MMOL/L (ref 7–15)
APPEARANCE UR: CLEAR
AST SERPL W P-5'-P-CCNC: 22 U/L (ref 0–45)
BILIRUB SERPL-MCNC: 0.4 MG/DL
BILIRUB UR QL STRIP: NEGATIVE
BUN SERPL-MCNC: 13.3 MG/DL (ref 8–23)
CALCIUM SERPL-MCNC: 9.6 MG/DL (ref 8.8–10.2)
CHLORIDE SERPL-SCNC: 102 MMOL/L (ref 98–107)
CHOLEST SERPL-MCNC: 125 MG/DL
COLOR UR AUTO: YELLOW
CREAT SERPL-MCNC: 0.88 MG/DL (ref 0.67–1.17)
CREAT UR-MCNC: 30.3 MG/DL
DEPRECATED HCO3 PLAS-SCNC: 27 MMOL/L (ref 22–29)
EGFRCR SERPLBLD CKD-EPI 2021: >90 ML/MIN/1.73M2
ERYTHROCYTE [DISTWIDTH] IN BLOOD BY AUTOMATED COUNT: 13.2 % (ref 10–15)
FASTING STATUS PATIENT QL REPORTED: YES
GLUCOSE SERPL-MCNC: 113 MG/DL (ref 70–99)
GLUCOSE UR STRIP-MCNC: NEGATIVE MG/DL
HBA1C MFR BLD: 7.8 % (ref 4–6.2)
HCT VFR BLD AUTO: 45.3 % (ref 40–53)
HDLC SERPL-MCNC: 44 MG/DL
HGB BLD-MCNC: 15.3 G/DL (ref 13.3–17.7)
HGB UR QL STRIP: NEGATIVE
KETONES UR STRIP-MCNC: NEGATIVE MG/DL
LDLC SERPL CALC-MCNC: 69 MG/DL
LEUKOCYTE ESTERASE UR QL STRIP: NEGATIVE
MCH RBC QN AUTO: 29.8 PG (ref 26.5–33)
MCHC RBC AUTO-ENTMCNC: 33.8 G/DL (ref 31.5–36.5)
MCV RBC AUTO: 88 FL (ref 78–100)
MICROALBUMIN UR-MCNC: <12 MG/L
MICROALBUMIN/CREAT UR: NORMAL MG/G{CREAT}
NITRATE UR QL: NEGATIVE
NONHDLC SERPL-MCNC: 81 MG/DL
PH UR STRIP: 7.5 [PH] (ref 5–9)
PLATELET # BLD AUTO: 222 10E3/UL (ref 150–450)
POTASSIUM SERPL-SCNC: 4.4 MMOL/L (ref 3.4–5.3)
PROT SERPL-MCNC: 7.3 G/DL (ref 6.4–8.3)
RBC # BLD AUTO: 5.13 10E6/UL (ref 4.4–5.9)
SODIUM SERPL-SCNC: 139 MMOL/L (ref 135–145)
SP GR UR STRIP: 1.01 (ref 1–1.03)
T4 FREE SERPL-MCNC: 1.11 NG/DL (ref 0.9–1.7)
TRIGL SERPL-MCNC: 62 MG/DL
TSH SERPL DL<=0.005 MIU/L-ACNC: 6.59 UIU/ML (ref 0.3–4.2)
UROBILINOGEN UR STRIP-MCNC: NORMAL MG/DL
WBC # BLD AUTO: 8.2 10E3/UL (ref 4–11)

## 2024-04-10 PROCEDURE — 85018 HEMOGLOBIN: CPT | Mod: ZL

## 2024-04-10 PROCEDURE — 80053 COMPREHEN METABOLIC PANEL: CPT | Mod: ZL

## 2024-04-10 PROCEDURE — 36415 COLL VENOUS BLD VENIPUNCTURE: CPT | Mod: ZL

## 2024-04-10 PROCEDURE — 81003 URINALYSIS AUTO W/O SCOPE: CPT | Mod: ZL

## 2024-04-10 PROCEDURE — 80061 LIPID PANEL: CPT | Mod: ZL

## 2024-04-10 PROCEDURE — 84443 ASSAY THYROID STIM HORMONE: CPT | Mod: ZL

## 2024-04-10 PROCEDURE — G0463 HOSPITAL OUTPT CLINIC VISIT: HCPCS

## 2024-04-10 PROCEDURE — 99214 OFFICE O/P EST MOD 30 MIN: CPT | Performed by: INTERNAL MEDICINE

## 2024-04-10 PROCEDURE — 83036 HEMOGLOBIN GLYCOSYLATED A1C: CPT | Mod: ZL

## 2024-04-10 PROCEDURE — G2211 COMPLEX E/M VISIT ADD ON: HCPCS | Performed by: INTERNAL MEDICINE

## 2024-04-10 PROCEDURE — 82570 ASSAY OF URINE CREATININE: CPT | Mod: ZL

## 2024-04-10 PROCEDURE — 84439 ASSAY OF FREE THYROXINE: CPT | Mod: ZL

## 2024-04-10 RX ORDER — LOSARTAN POTASSIUM 100 MG/1
100 TABLET ORAL DAILY
Qty: 90 TABLET | Refills: 4 | Status: SHIPPED | OUTPATIENT
Start: 2024-04-10

## 2024-04-10 RX ORDER — ATORVASTATIN CALCIUM 40 MG/1
40 TABLET, FILM COATED ORAL DAILY
Qty: 90 TABLET | Refills: 4 | Status: SHIPPED | OUTPATIENT
Start: 2024-04-10

## 2024-04-10 RX ORDER — INSULIN GLARGINE 100 [IU]/ML
INJECTION, SOLUTION SUBCUTANEOUS
Qty: 15 ML | Refills: 4 | Status: SHIPPED | OUTPATIENT
Start: 2024-04-10

## 2024-04-10 RX ORDER — GLIPIZIDE 10 MG/1
TABLET ORAL
Qty: 270 TABLET | Refills: 4 | Status: SHIPPED | OUTPATIENT
Start: 2024-04-10

## 2024-04-10 ASSESSMENT — ENCOUNTER SYMPTOMS
MYALGIAS: 0
FATIGUE: 0
CHILLS: 0
HEMATURIA: 0
DIZZINESS: 0
EYE PAIN: 0
DYSURIA: 0
BRUISES/BLEEDS EASILY: 0
AGITATION: 0
PALPITATIONS: 0
NUMBNESS: 1
ARTHRALGIAS: 0
VOMITING: 0
ABDOMINAL PAIN: 0
NAUSEA: 0
CONFUSION: 0
COUGH: 0
SHORTNESS OF BREATH: 0
FEVER: 0
LIGHT-HEADEDNESS: 0
WHEEZING: 0
DIARRHEA: 0

## 2024-04-10 ASSESSMENT — PAIN SCALES - GENERAL: PAINLEVEL: SEVERE PAIN (7)

## 2024-04-10 NOTE — PROGRESS NOTES
Assessment & Plan     ICD-10-CM    1. Controlled type 2 diabetes mellitus with complication, with long-term current use of insulin (H)  E11.8 glipiZIDE (GLUCOTROL) 10 MG tablet    Z79.4 insulin glargine (LANTUS SOLOSTAR) 100 UNIT/ML pen     metFORMIN (GLUCOPHAGE) 1000 MG tablet     Hemoglobin A1c     Urine Macroscopic with reflex to Microscopic     TSH with free T4 reflex     Hemoglobin A1c     CBC with platelets     Albumin Random Urine Quantitative with Creat Ratio     Comprehensive metabolic panel      2. Benign essential hypertension  I10 losartan (COZAAR) 100 MG tablet      3. CKD (chronic kidney disease) stage 2, GFR 60-89 ml/min  N18.2       4. Diabetic polyneuropathy associated with type 2 diabetes mellitus (H)  E11.42       5. Mixed hyperlipidemia  E78.2 atorvastatin (LIPITOR) 40 MG tablet     Lipid Profile      6. Vaccine counseling  Z71.85          Patient presents for follow-up multiple issues.    Diabetic polyneuropathy.  Chronic.  Ongoing.  Numbness and burning fluctuates with blood sugar control.  Has noted improvement with improved blood sugar control.  Declines need for nerve pain medication at this time.    Vaccine counseling completed.  Patient is due for multiple vaccinations.  Plans to get his tetanus shot today.  Pneumonia vaccination with next clinic appointment.    HYPERTENSION - Ongoing. Blood pressure is currently well controlled.  Medication side effects: None. Denies syncope or presyncope.  Continue current medications.   Medication list reviewed/updated. Refills completed as needed.      MIXED HYPERLIPIDEMIA.  Ongoing. LDL is at goal: Yes. Triglycerides are at goal: Yes.   Medication side effects reported: None.   Continue current medications for now. Medication list reviewed/updated. Refills completed as needed.  Recent Labs   Lab Test 04/10/24  0658 01/09/24  0644   CHOL 125 134   HDL 44 49   LDL 69 75   TRIG 62 50        Chronic Kidney Disease, Stage 2 (GFR 60-89), chronic, ongoing.   "Kidney function had been slowly declining.  Encourage NSAID avoidance.       Type 2 Diabetes Mellitus, with neuropathy, Reports ongoing/previous: numbness and burning.  Blood sugar control has been good with mild hyperglycemia. Doing well with diet, oral agents, exercise, and insulin injections.  Medication list reviewed/updated. Refills completed as needed.    Complicating factors include but are not limited to: hypertension, hyperlipidemia, neuropathy, and chronic kidney disease.     Recent Labs   Lab Test 04/10/24  0658 01/09/24  0644 09/07/23  0655 05/31/23  0649   A1C  --  7.4* 7.5* 7.3*   LDL 69 75 63 72   HDL 44 49 45 45   TRIG 62 50 64 62   ALT 21  --  22 24   CR 0.88 0.96 0.88 0.90   GFRESTIMATED >90 86 >90 >90   POTASSIUM 4.4 4.8 4.6 4.4   TSH 6.59* 6.15* 6.44* 5.77*   T4 1.11 1.12 1.11 1.13   WBC 8.2 8.2 7.6 8.8   HGB 15.3 15.6 15.9 15.7    240 221 232   ALBUMIN 4.4  --  4.6 4.4     Hemoglobin A1c has been controlled.  LDL HDL and triglycerides are at goal.  ALT normal.  Creatinine has improved.  Potassium normal.  TSH is elevated, free T4 normal.  CBC normal.  Albumin normal.       The longitudinal plan of care for the diagnosis(es)/condition(s) as documented were addressed during this visit. Due to the added complexity in care, I will continue to support Vicente in the subsequent management and with ongoing continuity of care.               BMI  Estimated body mass index is 28.31 kg/m  as calculated from the following:    Height as of this encounter: 1.765 m (5' 9.5\").    Weight as of this encounter: 88.2 kg (194 lb 8 oz).         Return in about 3 months (around 7/10/2024) for Annual Medicare Wellness Visit, + Get Diabetic labs prior to clinic appointment.      Moshe Wilson MD  [facility]    Review of Systems   Constitutional:  Negative for chills, fatigue and fever.   HENT:  Negative for congestion and hearing loss.    Eyes:  Negative for pain and visual disturbance. " "  Respiratory:  Negative for cough, shortness of breath and wheezing.    Cardiovascular:  Negative for chest pain and palpitations.   Gastrointestinal:  Negative for abdominal pain, diarrhea, nausea and vomiting.   Endocrine: Negative for cold intolerance and heat intolerance.   Genitourinary:  Negative for dysuria and hematuria.   Musculoskeletal:  Negative for arthralgias and myalgias.        Right 4th finger, in palm. starting to develop trigger finger, improved with regular home therapy   Skin:  Negative for pallor.   Allergic/Immunologic: Negative for immunocompromised state.   Neurological:  Positive for numbness (+ bilateral feet, comes and goes -- better with better glucose control). Negative for dizziness and light-headedness.   Hematological:  Does not bruise/bleed easily.   Psychiatric/Behavioral:  Negative for agitation and confusion.          Subjective   Vicente is a 69 year old, presenting for the following health issues:  Diabetes (Follow up)    Via the Health Maintenance questionnaire, the patient has reported the following services have been completed -Eye Exam, this information has been sent to the abstraction team.  History of Present Illness       He eats 4 or more servings of fruits and vegetables daily.He consumes 0 sweetened beverage(s) daily.He exercises with enough effort to increase his heart rate 60 or more minutes per day.  He exercises with enough effort to increase his heart rate 7 days per week.   He is taking medications regularly.                     Objective    /84   Pulse 93   Temp 97.2  F (36.2  C) (Tympanic)   Resp 18   Ht 1.765 m (5' 9.5\")   Wt 88.2 kg (194 lb 8 oz)   SpO2 96%   BMI 28.31 kg/m    Body mass index is 28.31 kg/m .  Physical Exam  Constitutional:       General: He is not in acute distress.     Appearance: He is well-developed. He is not diaphoretic.   HENT:      Head: Normocephalic and atraumatic.   Eyes:      General: No scleral icterus.     " Conjunctiva/sclera: Conjunctivae normal.   Neck:      Vascular: No carotid bruit.   Cardiovascular:      Rate and Rhythm: Normal rate and regular rhythm.      Pulses: Normal pulses.   Pulmonary:      Effort: Pulmonary effort is normal.      Breath sounds: Normal breath sounds.   Abdominal:      Palpations: Abdomen is soft.      Tenderness: There is no abdominal tenderness.   Musculoskeletal:         General: No tenderness or deformity.      Cervical back: Neck supple.      Right lower leg: No edema.      Left lower leg: No edema.   Lymphadenopathy:      Cervical: No cervical adenopathy.   Skin:     General: Skin is warm and dry.      Findings: No rash.      Comments: Start of contracture of right hand - 4th finger.   Neurological:      Mental Status: He is alert. Mental status is at baseline.   Psychiatric:         Mood and Affect: Mood normal.         Behavior: Behavior normal.                    Signed Electronically by: Moshe Wilson MD

## 2024-04-10 NOTE — NURSING NOTE
"Medication Reconciliation: Complete    Meena Robles LPN  4/10/2024 7:59 AM  Chief Complaint   Patient presents with    Diabetes     Follow up       Initial BP (!) 156/84 (BP Location: Right arm, Patient Position: Sitting, Cuff Size: Adult Regular)   Pulse 93   Temp 97.2  F (36.2  C) (Tympanic)   Resp 18   Ht 1.765 m (5' 9.5\")   Wt 88.2 kg (194 lb 8 oz)   SpO2 96%   BMI 28.31 kg/m   Estimated body mass index is 28.31 kg/m  as calculated from the following:    Height as of this encounter: 1.765 m (5' 9.5\").    Weight as of this encounter: 88.2 kg (194 lb 8 oz).  Medication Review: complete    The next two questions are to help us understand your food security.  If you are feeling you need any assistance in this area, we have resources available to support you today.          1/9/2024   SDOH- Food Insecurity   Within the past 12 months, did you worry that your food would run out before you got money to buy more? N   Within the past 12 months, did the food you bought just not last and you didn t have money to get more? N         Health Care Directive:  Patient has a Health Care Directive on file      Meena Robles LPN      "

## 2024-04-10 NOTE — PATIENT INSTRUCTIONS
Blood pressure is well controlled.   Diabetes is well controlled.     Medications refilled.   Labs are stable.       Immunization History   Administered Date(s) Administered    COVID-19 Monovalent 18+ (Moderna) 03/01/2021, 03/29/2021, 11/22/2021    Influenza (IIV3) PF 11/02/2006, 10/26/2007    Influenza Vaccine 65+ (Fluzone HD) 12/09/2020, 11/01/2022, 10/06/2023    Influenza Vaccine >6 months,quad, PF 11/06/2019, 11/22/2021    TDAP Vaccine (Adacel) 11/25/2013      Consider:  -- Yearly - COVID-19 Vaccination shot   -- Annual Flu / Influenza vaccination - Every Fall (Starting about October 1st)    Consider:  -- RSV vaccine for age 60+   (If Medicare insurance - get vaccine at the Pharmacy.     Private insurance may be able to get in clinic with clinic shot nurse.)  --- Check Cost $$$    -- Get your tetanus shot updated - from one of the local pharmacies, at your convenience -- Check cost/coverage.     -- Get the new shingles shot (2 in series) (Shingrix) - from one of the local pharmacies, at your convenience -- Check cost/coverage.       -- Pneumococcal PCV shot --- x1 --- anytime.         Results for orders placed or performed in visit on 04/10/24   Comprehensive metabolic panel     Status: Abnormal   Result Value Ref Range    Sodium 139 135 - 145 mmol/L    Potassium 4.4 3.4 - 5.3 mmol/L    Carbon Dioxide (CO2) 27 22 - 29 mmol/L    Anion Gap 10 7 - 15 mmol/L    Urea Nitrogen 13.3 8.0 - 23.0 mg/dL    Creatinine 0.88 0.67 - 1.17 mg/dL    GFR Estimate >90 >60 mL/min/1.73m2    Calcium 9.6 8.8 - 10.2 mg/dL    Chloride 102 98 - 107 mmol/L    Glucose 113 (H) 70 - 99 mg/dL    Alkaline Phosphatase 99 40 - 150 U/L    AST 22 0 - 45 U/L    ALT 21 0 - 70 U/L    Protein Total 7.3 6.4 - 8.3 g/dL    Albumin 4.4 3.5 - 5.2 g/dL    Bilirubin Total 0.4 <=1.2 mg/dL   Albumin Random Urine Quantitative with Creat Ratio     Status: None   Result Value Ref Range    Creatinine Urine mg/dL 30.3 mg/dL    Albumin Urine mg/L <12.0 mg/L     Albumin Urine mg/g Cr     Lipid Profile     Status: None   Result Value Ref Range    Cholesterol 125 <200 mg/dL    Triglycerides 62 <150 mg/dL    Direct Measure HDL 44 >=40 mg/dL    LDL Cholesterol Calculated 69 <=100 mg/dL    Non HDL Cholesterol 81 <130 mg/dL    Patient Fasting > 8hrs? Yes     Narrative    Cholesterol  Desirable:  <200 mg/dL    Triglycerides  Normal:  Less than 150 mg/dL  Borderline High:  150-199 mg/dL  High:  200-499 mg/dL  Very High:  Greater than or equal to 500 mg/dL    Direct Measure HDL  Female:  Greater than or equal to 50 mg/dL   Male:  Greater than or equal to 40 mg/dL    LDL Cholesterol  Desirable:  <100mg/dL  Above Desirable:  100-129 mg/dL   Borderline High:  130-159 mg/dL   High:  160-189 mg/dL   Very High:  >= 190 mg/dL    Non HDL Cholesterol  Desirable:  130 mg/dL  Above Desirable:  130-159 mg/dL  Borderline High:  160-189 mg/dL  High:  190-219 mg/dL  Very High:  Greater than or equal to 220 mg/dL   CBC with platelets     Status: Normal   Result Value Ref Range    WBC Count 8.2 4.0 - 11.0 10e3/uL    RBC Count 5.13 4.40 - 5.90 10e6/uL    Hemoglobin 15.3 13.3 - 17.7 g/dL    Hematocrit 45.3 40.0 - 53.0 %    MCV 88 78 - 100 fL    MCH 29.8 26.5 - 33.0 pg    MCHC 33.8 31.5 - 36.5 g/dL    RDW 13.2 10.0 - 15.0 %    Platelet Count 222 150 - 450 10e3/uL   TSH with free T4 reflex     Status: Abnormal   Result Value Ref Range    TSH 6.59 (H) 0.30 - 4.20 uIU/mL   Urine Macroscopic with reflex to Microscopic     Status: Normal   Result Value Ref Range    Color Urine Yellow Colorless, Straw, Light Yellow, Yellow    Appearance Urine Clear Clear    Glucose Urine Negative Negative mg/dL    Bilirubin Urine Negative Negative    Ketones Urine Negative Negative mg/dL    Specific Gravity Urine 1.007 1.000 - 1.030    Blood Urine Negative Negative    pH Urine 7.5 5.0 - 9.0    Protein Albumin Urine Negative Negative mg/dL    Urobilinogen Urine Normal Normal, 2.0 mg/dL    Nitrite Urine Negative Negative     Leukocyte Esterase Urine Negative Negative    Narrative    Microscopic not indicated        Aspects of Diabetes:   Recent Labs   Lab Test 04/10/24  0658 01/09/24  0644 09/07/23  0655 05/31/23  0649   A1C  --  7.4* 7.5* 7.3*   LDL 69 75 63 72   HDL 44 49 45 45   TRIG 62 50 64 62   ALT 21  --  22 24   CR 0.88 0.96 0.88 0.90   GFRESTIMATED >90 86 >90 >90   POTASSIUM 4.4 4.8 4.6 4.4   TSH 6.59* 6.15* 6.44* 5.77*   T4  --  1.12 1.11 1.13   WBC 8.2 8.2 7.6 8.8   HGB 15.3 15.6 15.9 15.7    240 221 232   ALBUMIN 4.4  --  4.6 4.4      Hemoglobin A1c  Goal range is under 8%. Best is 6.5 to 7   Blood Pressure 138/84 Goal to keep less than 140/90   Tobacco  reports that he has never smoked. He has never used smokeless tobacco. Goal to abstain from tobacco   Aspirin or Plavix Anti-platelet therapy Aspirin or Plavix reduces risk of heart disease and stroke  -- sometimes used with other blood thinners, depending on bleeding risk and risk factors.    ACE/ARB Specific blood pressure meds These medications can reduce risk of kidney disease   Cholesterol Statins (Lipitor, Crestor, vs others) Statins reduce risk of heart disease and stroke   Eye Exam -- Do Yearly -- Annual diabetic eye exam   Healthy weight Wt Readings from Last 4 Encounters:   04/10/24 88.2 kg (194 lb 8 oz)   01/09/24 88.9 kg (196 lb)   10/06/23 87.1 kg (192 lb)   09/07/23 88.5 kg (195 lb 3.2 oz)      Body mass index is 28.31 kg/m .  Goal BMI under 30, best is under 25.      -- Trying to exercise daily (goal at least 20 min/day) with moderate aerobic activity   -- Eat healthy (resources from ADA at http://www.diabetes.org/)   -- Taking good care of my feet. Consider seeing the Podiatrist   -- Check blood sugars as directed, record in log book and bring to every appointment    Insurance companies are grading you and I on your blood sugar control -- Goal is to get your A1c down to 7.9% or lower and NO Smoking!  -- Medicare and most insurance companies, will only  cover Hemoglobin A1c labs to be rechecked every 91+ days.      Return for Diabetes labs and clinic follow-up appointment every 3 to 4 months.    Schedule lab only appointment --- A few days AFTER: 7/9/24   Schedule clinic appointment with Dr. Wilson -- Same day as labs, or 1-2 days later.

## 2024-07-11 ENCOUNTER — OFFICE VISIT (OUTPATIENT)
Dept: FAMILY MEDICINE | Facility: OTHER | Age: 69
End: 2024-07-11
Attending: NURSE PRACTITIONER
Payer: COMMERCIAL

## 2024-07-11 VITALS
TEMPERATURE: 96.5 F | HEIGHT: 70 IN | RESPIRATION RATE: 16 BRPM | DIASTOLIC BLOOD PRESSURE: 68 MMHG | WEIGHT: 192.6 LBS | SYSTOLIC BLOOD PRESSURE: 136 MMHG | OXYGEN SATURATION: 99 % | HEART RATE: 108 BPM | BODY MASS INDEX: 27.57 KG/M2

## 2024-07-11 DIAGNOSIS — N18.2 CKD (CHRONIC KIDNEY DISEASE) STAGE 2, GFR 60-89 ML/MIN: ICD-10-CM

## 2024-07-11 DIAGNOSIS — E11.8 CONTROLLED TYPE 2 DIABETES MELLITUS WITH COMPLICATION, WITH LONG-TERM CURRENT USE OF INSULIN (H): ICD-10-CM

## 2024-07-11 DIAGNOSIS — M79.89 FINGER SWELLING: Primary | ICD-10-CM

## 2024-07-11 DIAGNOSIS — M79.645 PAIN OF FINGER OF LEFT HAND: ICD-10-CM

## 2024-07-11 DIAGNOSIS — L03.012 CELLULITIS OF FINGER OF LEFT HAND: ICD-10-CM

## 2024-07-11 DIAGNOSIS — Z79.4 CONTROLLED TYPE 2 DIABETES MELLITUS WITH COMPLICATION, WITH LONG-TERM CURRENT USE OF INSULIN (H): ICD-10-CM

## 2024-07-11 LAB
BASOPHILS # BLD AUTO: 0.1 10E3/UL (ref 0–0.2)
BASOPHILS NFR BLD AUTO: 1 %
CRP SERPL-MCNC: 5.16 MG/L
EOSINOPHIL # BLD AUTO: 0.2 10E3/UL (ref 0–0.7)
EOSINOPHIL NFR BLD AUTO: 2 %
ERYTHROCYTE [DISTWIDTH] IN BLOOD BY AUTOMATED COUNT: 13.1 % (ref 10–15)
ERYTHROCYTE [SEDIMENTATION RATE] IN BLOOD BY WESTERGREN METHOD: 8 MM/HR (ref 0–20)
HCT VFR BLD AUTO: 45.8 % (ref 40–53)
HGB BLD-MCNC: 15.2 G/DL (ref 13.3–17.7)
IMM GRANULOCYTES # BLD: 0 10E3/UL
IMM GRANULOCYTES NFR BLD: 0 %
LYMPHOCYTES # BLD AUTO: 1.7 10E3/UL (ref 0.8–5.3)
LYMPHOCYTES NFR BLD AUTO: 19 %
MCH RBC QN AUTO: 29.6 PG (ref 26.5–33)
MCHC RBC AUTO-ENTMCNC: 33.2 G/DL (ref 31.5–36.5)
MCV RBC AUTO: 89 FL (ref 78–100)
MONOCYTES # BLD AUTO: 0.6 10E3/UL (ref 0–1.3)
MONOCYTES NFR BLD AUTO: 7 %
NEUTROPHILS # BLD AUTO: 6.3 10E3/UL (ref 1.6–8.3)
NEUTROPHILS NFR BLD AUTO: 71 %
NRBC # BLD AUTO: 0 10E3/UL
NRBC BLD AUTO-RTO: 0 /100
PLATELET # BLD AUTO: 242 10E3/UL (ref 150–450)
RBC # BLD AUTO: 5.14 10E6/UL (ref 4.4–5.9)
URATE SERPL-MCNC: 3.8 MG/DL (ref 3.4–7)
WBC # BLD AUTO: 8.9 10E3/UL (ref 4–11)

## 2024-07-11 PROCEDURE — 36415 COLL VENOUS BLD VENIPUNCTURE: CPT | Mod: ZL | Performed by: NURSE PRACTITIONER

## 2024-07-11 PROCEDURE — 99214 OFFICE O/P EST MOD 30 MIN: CPT | Performed by: NURSE PRACTITIONER

## 2024-07-11 PROCEDURE — 84550 ASSAY OF BLOOD/URIC ACID: CPT | Mod: ZL | Performed by: NURSE PRACTITIONER

## 2024-07-11 PROCEDURE — 85652 RBC SED RATE AUTOMATED: CPT | Mod: ZL | Performed by: NURSE PRACTITIONER

## 2024-07-11 PROCEDURE — G0463 HOSPITAL OUTPT CLINIC VISIT: HCPCS

## 2024-07-11 PROCEDURE — 85004 AUTOMATED DIFF WBC COUNT: CPT | Mod: ZL | Performed by: NURSE PRACTITIONER

## 2024-07-11 PROCEDURE — 86140 C-REACTIVE PROTEIN: CPT | Mod: ZL | Performed by: NURSE PRACTITIONER

## 2024-07-11 RX ORDER — CEPHALEXIN 500 MG/1
500 CAPSULE ORAL 3 TIMES DAILY
Qty: 21 CAPSULE | Refills: 0 | Status: SHIPPED | OUTPATIENT
Start: 2024-07-11 | End: 2024-07-18

## 2024-07-11 ASSESSMENT — PAIN SCALES - GENERAL: PAINLEVEL: EXTREME PAIN (9)

## 2024-07-11 NOTE — NURSING NOTE
"Chief Complaint   Patient presents with    Finger       Initial /68   Pulse 108   Temp (!) 96.5  F (35.8  C) (Tympanic)   Resp 16   Ht 1.765 m (5' 9.5\")   Wt 87.4 kg (192 lb 9.6 oz)   SpO2 99%   BMI 28.03 kg/m   Estimated body mass index is 28.03 kg/m  as calculated from the following:    Height as of this encounter: 1.765 m (5' 9.5\").    Weight as of this encounter: 87.4 kg (192 lb 9.6 oz).  Medication Review: complete    The next two questions are to help us understand your food security.  If you are feeling you need any assistance in this area, we have resources available to support you today.          1/9/2024   SDOH- Food Insecurity   Within the past 12 months, did you worry that your food would run out before you got money to buy more? N   Within the past 12 months, did the food you bought just not last and you didn t have money to get more? N            Health Care Directive:  Patient has a Health Care Directive on file      Sasha Lea [facility]        "

## 2024-07-11 NOTE — PATIENT INSTRUCTIONS
Suspected infection of the finger, soft tissue. Will check labs including white blood cell count, inflammatory markers, and uric acid (to check for gout) and treat accordingly. I will call you with results and send proper treatment to the pharmacy once labs return.

## 2024-07-11 NOTE — PROGRESS NOTES
Assessment & Plan   Problem List Items Addressed This Visit       Controlled type 2 diabetes mellitus with complication, with long-term current use of insulin (H)    CKD (chronic kidney disease) stage 2, GFR 60-89 ml/min     Other Visit Diagnoses       Finger swelling    -  Primary    Relevant Medications    cephALEXin (KEFLEX) 500 MG capsule    Other Relevant Orders    CBC and Differential (Completed)    Uric acid (Completed)    CRP inflammation (Completed)    Sedimentation Rate (ESR) (Completed)    Pain of finger of left hand        Relevant Medications    cephALEXin (KEFLEX) 500 MG capsule    Other Relevant Orders    CBC and Differential (Completed)    Uric acid (Completed)    CRP inflammation (Completed)    Sedimentation Rate (ESR) (Completed)    Cellulitis of finger of left hand        Relevant Medications    cephALEXin (KEFLEX) 500 MG capsule           1. Finger swelling  2. Pain of finger of left hand  - CBC and Differential  - Uric acid  - CRP inflammation  - Sedimentation Rate (ESR)  - cephALEXin (KEFLEX) 500 MG capsule; Take 1 capsule (500 mg) by mouth 3 times daily for 7 days  Dispense: 21 capsule; Refill: 0  Unremarkable laboratory workup today including normal uric acid, CRP, sed rate, and CBC.  Clinically he is positive for soft tissue infection of the left fifth digit.      3. Cellulitis of finger of left hand  - cephALEXin (KEFLEX) 500 MG capsule; Take 1 capsule (500 mg) by mouth 3 times daily for 7 days  Dispense: 21 capsule; Refill: 0  Wbc is normal along with crp and sed rate. Uric acid also normal - ruled out gout. Treat with cephalexin for soft tissue infection of the finger without evidence of systemic infection. PMH includes DM with last A1c of 7.8 3 months ago. Recommend warm water soaks with epsom salts 3-4 times daily. There is no area that would benefit from incision and drainage at this time.     4. CKD (chronic kidney disease) stage 2, GFR 60-89 ml/min  5. Controlled type 2 diabetes  "mellitus with complication, with long-term current use of insulin (H)  Comorbidities that could contribute to infection and outcomes. Last GFR was >90 when checked 3 months ago; stable. A1C at 7.4 when last checked.   -glipizide  -metformin  -insulin      BMI  Estimated body mass index is 28.03 kg/m  as calculated from the following:    Height as of this encounter: 1.765 m (5' 9.5\").    Weight as of this encounter: 87.4 kg (192 lb 9.6 oz).       No follow-ups on file.      Subjective   Vicente is a 69 year old, presenting for the following health issues:  Finger        7/11/2024     8:30 AM   Additional Questions   Roomed by RICK Baez   Accompanied by Self     History of Present Illness       Reason for visit:  Little finger pain  acute  Symptom onset:  3-7 days ago  Symptom intensity:  Severe  Symptom progression:  Worsening  Had these symptoms before:  No  What makes it worse:  Moviing finger  What makes it better:  Elevate an not moving it    He eats 4 or more servings of fruits and vegetables daily.He consumes 0 sweetened beverage(s) daily.He exercises with enough effort to increase his heart rate 60 or more minutes per day.  He exercises with enough effort to increase his heart rate 7 days per week.   He is taking medications regularly.     Vicente presents today for concerns of left fifth digit being red, swollen, tender, and warm.  He denies any injury to the finger.  No history of gout.  He denies any alcohol intake or increased intake of red meat.  He is not a smoker.  He reports that he does not feel sick but he does feel slightly off.  Last night his blood sugar was 250 which is elevated for him.  He does use a glucometer and sticks his fingers for blood sugar checks.  He denies any fevers.  There is no drainage from the finger or the skin surrounding the affected area.        Review of Systems  Constitutional, HEENT, cardiovascular, pulmonary, GI, , musculoskeletal, neuro, skin, endocrine and psych systems " "are negative, except as otherwise noted.      Objective    /68   Pulse 108   Temp (!) 96.5  F (35.8  C) (Tympanic)   Resp 16   Ht 1.765 m (5' 9.5\")   Wt 87.4 kg (192 lb 9.6 oz)   SpO2 99%   BMI 28.03 kg/m    Body mass index is 28.03 kg/m .  Physical Exam  Vitals and nursing note reviewed.   Constitutional:       General: He is not in acute distress.     Appearance: Normal appearance. He is not ill-appearing.   Cardiovascular:      Rate and Rhythm: Normal rate and regular rhythm.      Heart sounds: No murmur heard.  Pulmonary:      Effort: Pulmonary effort is normal. No respiratory distress.      Breath sounds: Normal breath sounds. No wheezing.   Musculoskeletal:         General: Normal range of motion.   Skin:     General: Skin is warm and dry.      Capillary Refill: Capillary refill takes less than 2 seconds.      Findings: Erythema present.          Neurological:      General: No focal deficit present.      Mental Status: He is alert and oriented to person, place, and time.   Psychiatric:         Mood and Affect: Mood normal.         Behavior: Behavior normal.                Results for orders placed or performed in visit on 07/11/24   Uric acid     Status: Normal   Result Value Ref Range    Uric Acid 3.8 3.4 - 7.0 mg/dL   CRP inflammation     Status: Abnormal   Result Value Ref Range    CRP Inflammation 5.16 (H) <5.00 mg/L   Sedimentation Rate (ESR)     Status: Normal   Result Value Ref Range    Erythrocyte Sedimentation Rate 8 0 - 20 mm/hr   CBC with platelets and differential     Status: None   Result Value Ref Range    WBC Count 8.9 4.0 - 11.0 10e3/uL    RBC Count 5.14 4.40 - 5.90 10e6/uL    Hemoglobin 15.2 13.3 - 17.7 g/dL    Hematocrit 45.8 40.0 - 53.0 %    MCV 89 78 - 100 fL    MCH 29.6 26.5 - 33.0 pg    MCHC 33.2 31.5 - 36.5 g/dL    RDW 13.1 10.0 - 15.0 %    Platelet Count 242 150 - 450 10e3/uL    % Neutrophils 71 %    % Lymphocytes 19 %    % Monocytes 7 %    % Eosinophils 2 %    % Basophils " 1 %    % Immature Granulocytes 0 %    NRBCs per 100 WBC 0 <1 /100    Absolute Neutrophils 6.3 1.6 - 8.3 10e3/uL    Absolute Lymphocytes 1.7 0.8 - 5.3 10e3/uL    Absolute Monocytes 0.6 0.0 - 1.3 10e3/uL    Absolute Eosinophils 0.2 0.0 - 0.7 10e3/uL    Absolute Basophils 0.1 0.0 - 0.2 10e3/uL    Absolute Immature Granulocytes 0.0 <=0.4 10e3/uL    Absolute NRBCs 0.0 10e3/uL   CBC and Differential     Status: None    Narrative    The following orders were created for panel order CBC and Differential.  Procedure                               Abnormality         Status                     ---------                               -----------         ------                     CBC with platelets and d...[[id number]]                      Final result                 Please view results for these tests on the individual orders.         Signed Electronically by: Sasha Morales NP

## 2024-07-24 ENCOUNTER — LAB (OUTPATIENT)
Dept: LAB | Facility: OTHER | Age: 69
End: 2024-07-24
Attending: INTERNAL MEDICINE
Payer: COMMERCIAL

## 2024-07-24 ENCOUNTER — OFFICE VISIT (OUTPATIENT)
Dept: INTERNAL MEDICINE | Facility: OTHER | Age: 69
End: 2024-07-24
Attending: INTERNAL MEDICINE
Payer: MEDICARE

## 2024-07-24 VITALS
WEIGHT: 193 LBS | HEART RATE: 84 BPM | SYSTOLIC BLOOD PRESSURE: 134 MMHG | HEIGHT: 70 IN | DIASTOLIC BLOOD PRESSURE: 78 MMHG | TEMPERATURE: 97.6 F | RESPIRATION RATE: 16 BRPM | BODY MASS INDEX: 27.63 KG/M2 | OXYGEN SATURATION: 98 %

## 2024-07-24 DIAGNOSIS — N18.2 CKD (CHRONIC KIDNEY DISEASE) STAGE 2, GFR 60-89 ML/MIN: ICD-10-CM

## 2024-07-24 DIAGNOSIS — E78.2 MIXED HYPERLIPIDEMIA: ICD-10-CM

## 2024-07-24 DIAGNOSIS — E11.65 TYPE 2 DIABETES MELLITUS WITH HYPERGLYCEMIA, WITH LONG-TERM CURRENT USE OF INSULIN (H): ICD-10-CM

## 2024-07-24 DIAGNOSIS — Z71.85 VACCINE COUNSELING: ICD-10-CM

## 2024-07-24 DIAGNOSIS — Z86.39 H/O HYPERPARATHYROIDISM: ICD-10-CM

## 2024-07-24 DIAGNOSIS — Z90.89 S/P SUBTOTAL PARATHYROIDECTOMY: ICD-10-CM

## 2024-07-24 DIAGNOSIS — E11.8 CONTROLLED TYPE 2 DIABETES MELLITUS WITH COMPLICATION, WITH LONG-TERM CURRENT USE OF INSULIN (H): ICD-10-CM

## 2024-07-24 DIAGNOSIS — E11.8 CONTROLLED TYPE 2 DIABETES MELLITUS WITH COMPLICATION, WITH LONG-TERM CURRENT USE OF INSULIN (H): Primary | ICD-10-CM

## 2024-07-24 DIAGNOSIS — Z79.4 CONTROLLED TYPE 2 DIABETES MELLITUS WITH COMPLICATION, WITH LONG-TERM CURRENT USE OF INSULIN (H): ICD-10-CM

## 2024-07-24 DIAGNOSIS — Z79.4 TYPE 2 DIABETES MELLITUS WITH HYPERGLYCEMIA, WITH LONG-TERM CURRENT USE OF INSULIN (H): ICD-10-CM

## 2024-07-24 DIAGNOSIS — I10 BENIGN ESSENTIAL HYPERTENSION: ICD-10-CM

## 2024-07-24 DIAGNOSIS — Z79.4 CONTROLLED TYPE 2 DIABETES MELLITUS WITH COMPLICATION, WITH LONG-TERM CURRENT USE OF INSULIN (H): Primary | ICD-10-CM

## 2024-07-24 DIAGNOSIS — Z98.890 S/P SUBTOTAL PARATHYROIDECTOMY: ICD-10-CM

## 2024-07-24 DIAGNOSIS — E11.42 DIABETIC POLYNEUROPATHY ASSOCIATED WITH TYPE 2 DIABETES MELLITUS (H): ICD-10-CM

## 2024-07-24 DIAGNOSIS — Z00.00 MEDICARE ANNUAL WELLNESS VISIT, SUBSEQUENT: ICD-10-CM

## 2024-07-24 LAB
ALBUMIN SERPL BCG-MCNC: 4.5 G/DL (ref 3.5–5.2)
ALBUMIN UR-MCNC: NEGATIVE MG/DL
ALP SERPL-CCNC: 104 U/L (ref 40–150)
ALT SERPL W P-5'-P-CCNC: 16 U/L (ref 0–70)
ANION GAP SERPL CALCULATED.3IONS-SCNC: 8 MMOL/L (ref 7–15)
APPEARANCE UR: CLEAR
AST SERPL W P-5'-P-CCNC: 20 U/L (ref 0–45)
BILIRUB SERPL-MCNC: 0.5 MG/DL
BILIRUB UR QL STRIP: NEGATIVE
BUN SERPL-MCNC: 10.9 MG/DL (ref 8–23)
CALCIUM SERPL-MCNC: 9.7 MG/DL (ref 8.8–10.4)
CHLORIDE SERPL-SCNC: 101 MMOL/L (ref 98–107)
CHOLEST SERPL-MCNC: 129 MG/DL
COLOR UR AUTO: YELLOW
CREAT SERPL-MCNC: 0.9 MG/DL (ref 0.67–1.17)
CREAT UR-MCNC: 18.1 MG/DL
EGFRCR SERPLBLD CKD-EPI 2021: >90 ML/MIN/1.73M2
ERYTHROCYTE [DISTWIDTH] IN BLOOD BY AUTOMATED COUNT: 13 % (ref 10–15)
FASTING STATUS PATIENT QL REPORTED: YES
FASTING STATUS PATIENT QL REPORTED: YES
GLUCOSE SERPL-MCNC: 113 MG/DL (ref 70–99)
GLUCOSE UR STRIP-MCNC: NEGATIVE MG/DL
HBA1C MFR BLD: 8 % (ref 4–6.2)
HCO3 SERPL-SCNC: 29 MMOL/L (ref 22–29)
HCT VFR BLD AUTO: 46.9 % (ref 40–53)
HDLC SERPL-MCNC: 44 MG/DL
HGB BLD-MCNC: 16.2 G/DL (ref 13.3–17.7)
HGB UR QL STRIP: NEGATIVE
KETONES UR STRIP-MCNC: NEGATIVE MG/DL
LDLC SERPL CALC-MCNC: 72 MG/DL
LEUKOCYTE ESTERASE UR QL STRIP: NEGATIVE
MCH RBC QN AUTO: 30.4 PG (ref 26.5–33)
MCHC RBC AUTO-ENTMCNC: 34.5 G/DL (ref 31.5–36.5)
MCV RBC AUTO: 88 FL (ref 78–100)
MICROALBUMIN UR-MCNC: <12 MG/L
MICROALBUMIN/CREAT UR: NORMAL MG/G{CREAT}
NITRATE UR QL: NEGATIVE
NONHDLC SERPL-MCNC: 85 MG/DL
PH UR STRIP: 7.5 [PH] (ref 5–9)
PLATELET # BLD AUTO: 230 10E3/UL (ref 150–450)
POTASSIUM SERPL-SCNC: 4.6 MMOL/L (ref 3.4–5.3)
PROT SERPL-MCNC: 7.8 G/DL (ref 6.4–8.3)
RBC # BLD AUTO: 5.33 10E6/UL (ref 4.4–5.9)
SODIUM SERPL-SCNC: 138 MMOL/L (ref 135–145)
SP GR UR STRIP: 1 (ref 1–1.03)
T4 FREE SERPL-MCNC: 1.15 NG/DL (ref 0.9–1.7)
TRIGL SERPL-MCNC: 63 MG/DL
TSH SERPL DL<=0.005 MIU/L-ACNC: 7.14 UIU/ML (ref 0.3–4.2)
UROBILINOGEN UR STRIP-MCNC: NORMAL MG/DL
WBC # BLD AUTO: 7.7 10E3/UL (ref 4–11)

## 2024-07-24 PROCEDURE — 83036 HEMOGLOBIN GLYCOSYLATED A1C: CPT | Mod: ZL

## 2024-07-24 PROCEDURE — 85027 COMPLETE CBC AUTOMATED: CPT | Mod: ZL

## 2024-07-24 PROCEDURE — 82465 ASSAY BLD/SERUM CHOLESTEROL: CPT | Mod: ZL

## 2024-07-24 PROCEDURE — 84439 ASSAY OF FREE THYROXINE: CPT | Mod: ZL

## 2024-07-24 PROCEDURE — 81003 URINALYSIS AUTO W/O SCOPE: CPT | Mod: ZL

## 2024-07-24 PROCEDURE — 36415 COLL VENOUS BLD VENIPUNCTURE: CPT | Mod: ZL

## 2024-07-24 PROCEDURE — G0463 HOSPITAL OUTPT CLINIC VISIT: HCPCS

## 2024-07-24 PROCEDURE — 99214 OFFICE O/P EST MOD 30 MIN: CPT | Mod: 25 | Performed by: INTERNAL MEDICINE

## 2024-07-24 PROCEDURE — 84443 ASSAY THYROID STIM HORMONE: CPT | Mod: ZL

## 2024-07-24 PROCEDURE — 82043 UR ALBUMIN QUANTITATIVE: CPT | Mod: ZL

## 2024-07-24 PROCEDURE — 84295 ASSAY OF SERUM SODIUM: CPT | Mod: ZL

## 2024-07-24 PROCEDURE — 99207 PR FOOT EXAM NO CHARGE: CPT | Performed by: INTERNAL MEDICINE

## 2024-07-24 PROCEDURE — G0439 PPPS, SUBSEQ VISIT: HCPCS | Performed by: INTERNAL MEDICINE

## 2024-07-24 RX ORDER — RESPIRATORY SYNCYTIAL VIRUS VACCINE 120MCG/0.5
0.5 KIT INTRAMUSCULAR ONCE
Qty: 1 EACH | Refills: 0 | Status: SHIPPED | OUTPATIENT
Start: 2024-07-24 | End: 2024-07-24

## 2024-07-24 RX ORDER — ACYCLOVIR 400 MG/1
1 TABLET ORAL
Qty: 3 EACH | Refills: 11 | Status: SHIPPED | OUTPATIENT
Start: 2024-07-24

## 2024-07-24 RX ORDER — ACYCLOVIR 400 MG/1
1 TABLET ORAL DAILY
Qty: 1 EACH | Refills: 0 | Status: SHIPPED | OUTPATIENT
Start: 2024-07-24

## 2024-07-24 SDOH — HEALTH STABILITY: PHYSICAL HEALTH: ON AVERAGE, HOW MANY DAYS PER WEEK DO YOU ENGAGE IN MODERATE TO STRENUOUS EXERCISE (LIKE A BRISK WALK)?: 7 DAYS

## 2024-07-24 ASSESSMENT — ENCOUNTER SYMPTOMS
WHEEZING: 0
DYSURIA: 0
VOMITING: 0
MYALGIAS: 0
AGITATION: 0
BRUISES/BLEEDS EASILY: 0
ABDOMINAL PAIN: 0
FEVER: 0
ENDOCRINE COMMENTS: + HYPERGLYCEMIA
ARTHRALGIAS: 0
SHORTNESS OF BREATH: 0
FATIGUE: 0
DIARRHEA: 0
CONFUSION: 0
EYE PAIN: 0
DIZZINESS: 0
PALPITATIONS: 0
NUMBNESS: 1
LIGHT-HEADEDNESS: 0
CHILLS: 0
NAUSEA: 0
HEMATURIA: 0
COUGH: 0

## 2024-07-24 ASSESSMENT — SOCIAL DETERMINANTS OF HEALTH (SDOH): HOW OFTEN DO YOU GET TOGETHER WITH FRIENDS OR RELATIVES?: MORE THAN THREE TIMES A WEEK

## 2024-07-24 ASSESSMENT — PAIN SCALES - GENERAL: PAINLEVEL: SEVERE PAIN (7)

## 2024-07-24 NOTE — PROGRESS NOTES
Assessment & Plan     ICD-10-CM    1. Controlled type 2 diabetes mellitus with complication, with long-term current use of insulin (H)  E11.8 Adult Eye  Referral    Z79.4 metFORMIN (GLUCOPHAGE) 1000 MG tablet     Continuous Glucose  (DEXCOM G7 ) JACKSON     Continuous Glucose Sensor (DEXCOM G7 SENSOR) MISC      2. Diabetic polyneuropathy associated with type 2 diabetes mellitus (H)  E11.42 UT FOOT EXAM NO CHARGE      3. Benign essential hypertension  I10       4. CKD (chronic kidney disease) stage 2, GFR 60-89 ml/min  N18.2       5. Mixed hyperlipidemia  E78.2       6. H/O hyperparathyroidism  Z86.39       7. S/P subtotal parathyroidectomy  Z98.890     Z90.89       8. Vaccine counseling  Z71.85 zoster vaccine recombinant adjuvanted (SHINGRIX) injection     Tdap, tetanus-diptheria-acell pertussis, (BOOSTRIX) 5-2.5-18.5 LF-MCG/0.5 SREEKANTH injection     RSV vaccine, bivalent, ABRYSVO, (ABRYSVO) injection      9. Medicare annual wellness visit, subsequent  Z00.00       10. Type 2 diabetes mellitus with hyperglycemia, with long-term current use of insulin (H)  E11.65 Continuous Glucose  (DEXCOM G7 ) JACKSON    Z79.4 Continuous Glucose Sensor (DEXCOM G7 SENSOR) [facility]         Patient presents for Medicare annual wellness visit as well as follow-up multiple issues.    Blood sugar levels have gone up.  States that he has been eating a lot of fresh fruit.  Hemoglobin A1c now high and not at goal.  -Start Dexcom G7.  Prescription sent to pharmacy.  Advised to increase insulin dosing from 18 units at bedtime up to 22-25 units at bedtime.    History of hypercalcemia with hyperparathyroidism, now status post partial parathyroidectomy.  Doing well.  Calcium levels are now well-controlled.    Vaccines are due, orders placed.    Hyperglycemia due to type 2 diabetes.  See above and below.  Start Dexcom G7.    HYPERTENSION - Ongoing. Blood pressure is currently well controlled.  Medication side effects:  None. Denies syncope or presyncope.  Continue current medications.   Medication list reviewed/updated. Refills completed as needed.      MIXED HYPERLIPIDEMIA.  Ongoing. LDL is at goal: No. Triglycerides are at goal: Yes.  Hopefully lifestyle modifications will improve cholesterol levels, otherwise will consider additional medication dose adjustments or medication changes.  Medication side effects reported: None.   Continue current medications for now. Medication list reviewed/updated. Refills completed as needed.  Recent Labs   Lab Test 07/24/24  0659 04/10/24  0658   CHOL 129 125   HDL 44 44   LDL 72 69   TRIG 63 62        Chronic Kidney Disease, Stage 1 (GFR >/= 90), chronic, ongoing.  Kidney function had been slowly declining.  Encourage NSAID avoidance.       Vaccine counseling completed.  Encourage routine / annual vaccinations.      I have evaluated this patient in-person today and I am prescribing continuous glucose monitor for the following reasons:  -The patient has a diagnosis of diabetes mellitus.  -The patient's medication regimen requires frequent adjustments due to hyperglycemia.        Type 2 Diabetes Mellitus, with nephropathy, with retinopathy, with neuropathy, Reports ongoing/previous: numbness and burning.  Blood sugar control has been poor with moderate hyperglycemia. Doing well with diet, oral agents, exercise, and insulin injections.  Medication list reviewed/updated. Refills completed as needed.    Complicating factors include but are not limited to: hypertension, hyperlipidemia, retinopathy, neuropathy, and chronic kidney disease.     Recent Labs   Lab Test 07/24/24  0659 07/11/24  0906 04/10/24  0658 01/09/24  0644 09/07/23  0655   A1C 8.0*  --  7.8* 7.4* 7.5*   LDL 72  --  69 75 63   HDL 44  --  44 49 45   TRIG 63  --  62 50 64   ALT 16  --  21  --  22   CR 0.90  --  0.88 0.96 0.88   GFRESTIMATED >90  --  >90 86 >90   POTASSIUM 4.6  --  4.4 4.8 4.6   TSH 7.14*  --  6.59* 6.15* 6.44*   T4  "1.15  --  1.11 1.12 1.11   WBC 7.7 8.9 8.2 8.2 7.6   HGB 16.2 15.2 15.3 15.6 15.9    242 222 240 221   ALBUMIN 4.5  --  4.4  --  4.6     Hemoglobin A1c is high and not at goal.  LDL is high and not at goal.  She will triglycerides are at goal.  ALT normal.  Creatinine is at baseline.  Potassium normal.  TSH is elevated, free T4 is within normal limits.  CBC normal.  Albumin normal.             BMI  Estimated body mass index is 28.09 kg/m  as calculated from the following:    Height as of this encounter: 1.765 m (5' 9.5\").    Weight as of this encounter: 87.5 kg (193 lb).     Blood sugar testing frequency justification:  Uncontrolled diabetes, Risk of hypoglycemia with medication(s), and Patient modifying lifestyle changes (diet, exercise) with blood sugars    Counseling  Appropriate preventive services were addressed with this patient via screening, questionnaire, or discussion as appropriate for fall prevention, nutrition, physical activity, Tobacco-use cessation, weight loss and cognition.  Checklist reviewing preventive services available has been given to the patient.  Reviewed patient's diet, addressing concerns and/or questions.   The patient was instructed to see the dentist every 6 months.         Return in about 3 months (around 10/24/2024) for - Labs every 91+ days, with DM Follow-up, Same Day or 1-2 days later with Dr. Wilson.      Moshe Wilson MD  [facility]    Review of Systems   Constitutional:  Negative for chills, fatigue and fever.   HENT:  Negative for congestion and hearing loss.    Eyes:  Negative for pain and visual disturbance.   Respiratory:  Negative for cough, shortness of breath and wheezing.    Cardiovascular:  Negative for chest pain and palpitations.   Gastrointestinal:  Negative for abdominal pain, diarrhea, nausea and vomiting.   Endocrine: Negative for cold intolerance and heat intolerance.        + Hyperglycemia   Genitourinary:  Negative for dysuria " and hematuria.   Musculoskeletal:  Negative for arthralgias and myalgias.        Right 4th finger, in palm. starting to develop trigger finger, improved with regular home therapy   Skin:  Negative for pallor.   Allergic/Immunologic: Negative for immunocompromised state.   Neurological:  Positive for numbness (+ bilateral feet, comes and goes -- better with better glucose control). Negative for dizziness and light-headedness.   Hematological:  Does not bruise/bleed easily.   Psychiatric/Behavioral:  Negative for agitation and confusion.        Preventive Care Visit  [facility]  Moshe Wilson MD, Internal Medicine  Jul 24, 2024      Subjective   Vicente is a 69 year old, presenting for the following:  Medicare Visit and Diabetes        7/24/2024     7:51 AM   Additional Questions   Roomed by RICK Baez   Accompanied by Self         Health Care Directive  Patient has a Health Care Directive on file  Advance care planning document is on file and is current.    History of Present Illness       Diabetes:   He presents for follow up of diabetes.  He is checking home blood glucose three times daily.   He checks blood glucose before meals.  Blood glucose is never over 200 and sometimes under 70. He is aware of hypoglycemia symptoms including shakiness, weakness and blurred vision.   He is concerned about other.   He is having numbness in feet and burning in feet.  The patient has had a diabetic eye exam in the last 12 months. Eye exam performed on have to check. Location of last eye exam [place].                     7/24/2024   General Health   How would you rate your overall physical health? Good   Feel stress (tense, anxious, or unable to sleep) Not at all            7/24/2024   Nutrition   Diet: Low salt    Low fat/cholesterol    Diabetic    Carbohydrate counting       Multiple values from one day are sorted in reverse-chronological order         7/24/2024   Exercise   Days per week of  moderate/strenous exercise 7 days            7/24/2024   Social Factors   Frequency of gathering with friends or relatives More than three times a week   Worry food won't last until get money to buy more No   Food not last or not have enough money for food? No   Do you have housing? (Housing is defined as stable permanent housing and does not include staying ouside in a car, in a tent, in an abandoned building, in an overnight shelter, or couch-surfing.) Yes   Are you worried about losing your housing? No   Lack of transportation? No   Unable to get utilities (heat,electricity)? No            7/24/2024   Fall Risk   Fallen 2 or more times in the past year? No   Trouble with walking or balance? No             7/24/2024   Activities of Daily Living- Home Safety   Needs help with the following daily activites None of the above   Safety concerns in the home None of the above            7/24/2024   Dental   Dentist two times every year? (!) NO            7/24/2024   Hearing Screening   Hearing concerns? None of the above            7/24/2024   Driving Risk Screening   Patient/family members have concerns about driving No            7/24/2024   General Alertness/Fatigue Screening   Have you been more tired than usual lately? No            7/24/2024   Urinary Incontinence Screening   Bothered by leaking urine in past 6 months No            7/24/2024   TB Screening   Were you born outside of the [place]? No            Today's PHQ-2 Score:       7/24/2024     7:40 AM   PHQ-2 ( 1999 [employer])   Q1: Little interest or pleasure in doing things 0   Q2: Feeling down, depressed or hopeless 0   PHQ-2 Score 0   Q1: Little interest or pleasure in doing things Not at all   Q2: Feeling down, depressed or hopeless Not at all   PHQ-2 Score 0           7/24/2024   Substance Use   Alcohol more than 3/day or more than 7/wk No   Do you have a current opioid prescription? No   How severe/bad is pain from 1 to 10? 7/10   Do you use any other  substances recreationally? No        Social History     Tobacco Use    Smoking status: Never    Smokeless tobacco: Never   Vaping Use    Vaping status: Never Used   Substance Use Topics    Alcohol use: No    Drug use: No           7/24/2024   AAA Screening   Family history of Abdominal Aortic Aneurysm (AAA)? (!) YES       Last PSA:   Prostate Specific Antigen Screen   Date Value Ref Range Status   01/09/2024 1.79 0.00 - 4.50 ng/mL Final     ASCVD Risk   The ASCVD Risk score (Wesotn DK, et al., 2019) failed to calculate for the following reasons:    The valid total cholesterol range is 130 to 320 mg/dL            Reviewed and updated as needed this visit by Provider   Tobacco  Allergies  Meds  Problems  Med Hx  Surg Hx  Fam Hx              Current providers sharing in care for this patient include:  Patient Care Team:  Moshe Wilson MD as PCP - General (Internal Medicine)  Moshe Wilson MD as Assigned PCP    The following health maintenance items are reviewed in Epic and correct as of today:  Health Maintenance   Topic Date Due    ZOSTER IMMUNIZATION (1 of 2) Never done    RSV VACCINE (Pregnancy & 60+) (1 - 1-dose 60+ series) Never done    EYE EXAM  04/01/2024    Pneumococcal Vaccine: 65+ Years (1 of 2 - PCV) 10/01/2024 (Originally 1/25/1961)    COVID-19 Vaccine (4 - 2023-24 season) 10/01/2024 (Originally 9/1/2023)    INFLUENZA VACCINE (1) 09/01/2024    A1C  01/24/2025    MEDICARE ANNUAL WELLNESS VISIT  07/24/2025    BMP  07/24/2025    LIPID  07/24/2025    MICROALBUMIN  07/24/2025    DIABETIC FOOT EXAM  07/24/2025    FALL RISK ASSESSMENT  07/24/2025    COLORECTAL CANCER SCREENING  11/28/2027    ADVANCE CARE PLANNING  07/24/2029    DTAP/TDAP/TD IMMUNIZATION (3 - Td or Tdap) 04/10/2034    PHQ-2 (once per calendar year)  Completed    URINALYSIS  Completed    HEPATITIS C SCREENING  Addressed    IPV IMMUNIZATION  Aged Out    HPV IMMUNIZATION  Aged Out    MENINGITIS IMMUNIZATION  Aged Out    RSV  "MONOCLONAL ANTIBODY  Aged Out            Objective    Exam  /78   Pulse 84   Temp 97.6  F (36.4  C) (Tympanic)   Resp 16   Ht 1.765 m (5' 9.5\")   Wt 87.5 kg (193 lb)   SpO2 98%   BMI 28.09 kg/m     Estimated body mass index is 28.09 kg/m  as calculated from the following:    Height as of this encounter: 1.765 m (5' 9.5\").    Weight as of this encounter: 87.5 kg (193 lb).    Physical Exam  Constitutional:       General: He is not in acute distress.     Appearance: He is well-developed. He is not diaphoretic.   HENT:      Head: Normocephalic and atraumatic.   Eyes:      General: No scleral icterus.     Conjunctiva/sclera: Conjunctivae normal.   Neck:      Vascular: No carotid bruit.   Cardiovascular:      Rate and Rhythm: Normal rate and regular rhythm.      Pulses: Normal pulses.           Dorsalis pedis pulses are 2+ on the right side and 2+ on the left side.        Posterior tibial pulses are 2+ on the right side and 2+ on the left side.   Pulmonary:      Effort: Pulmonary effort is normal.      Breath sounds: Normal breath sounds.   Abdominal:      Palpations: Abdomen is soft.      Tenderness: There is no abdominal tenderness.   Musculoskeletal:         General: No tenderness or deformity.      Cervical back: Neck supple.      Right lower leg: No edema.      Left lower leg: No edema.   Feet:      Right foot:      Protective Sensation: 10 sites tested.   1 site sensed.     Skin integrity: No ulcer, blister or callus.      Toenail Condition: Fungal disease present.     Left foot:      Protective Sensation: 10 sites tested.   1 site sensed.     Skin integrity: No ulcer, blister or callus.      Toenail Condition: Fungal disease present.  Lymphadenopathy:      Cervical: No cervical adenopathy.   Skin:     General: Skin is warm and dry.      Findings: No rash.      Comments: Start of contracture of right hand - 4th finger.   Neurological:      Mental Status: He is alert. Mental status is at baseline. "   Psychiatric:         Mood and Affect: Mood normal.         Behavior: Behavior normal.               7/24/2024   Mini Cog   Clock Draw Score 2 Normal   3 Item Recall 3 objects recalled   Mini Cog Total Score 5                 Signed Electronically by: Moshe Wilson MD

## 2024-07-24 NOTE — PATIENT INSTRUCTIONS
Blood pressure is well controlled.   Diabetes is well controlled.     Medications refilled.   Labs are stable.      To help improve blood sugar control....    -- Try to avoid Carbohydrates as much as possible -- breads, pasta, baked goods, cakes, oatmeal, cold cereal, potatoes.   -- Eat more lean meats, proteins, eggs, nuts, vegetables.    -- Start or Continue regular daily exercise.     Get out and exercise, bike ride, walk for 10 to 15 minutes after each meal -- this can significantly lowers the spike in blood sugar after eating.       A1c has gone up.  Now 8.0%.    -- Increase your insulin at bedtime --- up to 24 or 25 units. Especially while eating all the fresh fruit.       Orders placed for DEXCOM.   - Can schedule appointment with Clinic RN to help with placement and registration - or Tiana if needed.       Results for orders placed or performed in visit on 07/24/24   Urine Macroscopic with reflex to Microscopic     Status: Normal   Result Value Ref Range    Color Urine Yellow Colorless, Straw, Light Yellow, Yellow    Appearance Urine Clear Clear    Glucose Urine Negative Negative mg/dL    Bilirubin Urine Negative Negative    Ketones Urine Negative Negative mg/dL    Specific Gravity Urine 1.004 1.000 - 1.030    Blood Urine Negative Negative    pH Urine 7.5 5.0 - 9.0    Protein Albumin Urine Negative Negative mg/dL    Urobilinogen Urine Normal Normal, 2.0 mg/dL    Nitrite Urine Negative Negative    Leukocyte Esterase Urine Negative Negative    Narrative    Microscopic not indicated   TSH with free T4 reflex     Status: Abnormal   Result Value Ref Range    TSH 7.14 (H) 0.30 - 4.20 uIU/mL   Hemoglobin A1c     Status: Abnormal   Result Value Ref Range    Hemoglobin A1C 8.0 (H) 4.0 - 6.2 %   CBC with platelets     Status: Normal   Result Value Ref Range    WBC Count 7.7 4.0 - 11.0 10e3/uL    RBC Count 5.33 4.40 - 5.90 10e6/uL    Hemoglobin 16.2 13.3 - 17.7 g/dL    Hematocrit 46.9 40.0 - 53.0 %    MCV 88 78 - 100  fL    MCH 30.4 26.5 - 33.0 pg    MCHC 34.5 31.5 - 36.5 g/dL    RDW 13.0 10.0 - 15.0 %    Platelet Count 230 150 - 450 10e3/uL   Albumin Random Urine Quantitative with Creat Ratio     Status: None   Result Value Ref Range    Creatinine Urine mg/dL 18.1 mg/dL    Albumin Urine mg/L <12.0 mg/L    Albumin Urine mg/g Cr     Lipid Profile     Status: None   Result Value Ref Range    Cholesterol 129 <200 mg/dL    Triglycerides 63 <150 mg/dL    Direct Measure HDL 44 >=40 mg/dL    LDL Cholesterol Calculated 72 <=100 mg/dL    Non HDL Cholesterol 85 <130 mg/dL    Patient Fasting > 8hrs? Yes     Narrative    Cholesterol  Desirable:  <200 mg/dL    Triglycerides  Normal:  Less than 150 mg/dL  Borderline High:  150-199 mg/dL  High:  200-499 mg/dL  Very High:  Greater than or equal to 500 mg/dL    Direct Measure HDL  Female:  Greater than or equal to 50 mg/dL   Male:  Greater than or equal to 40 mg/dL    LDL Cholesterol  Desirable:  <100mg/dL  Above Desirable:  100-129 mg/dL   Borderline High:  130-159 mg/dL   High:  160-189 mg/dL   Very High:  >= 190 mg/dL    Non HDL Cholesterol  Desirable:  130 mg/dL  Above Desirable:  130-159 mg/dL  Borderline High:  160-189 mg/dL  High:  190-219 mg/dL  Very High:  Greater than or equal to 220 mg/dL   Comprehensive metabolic panel     Status: Abnormal   Result Value Ref Range    Sodium 138 135 - 145 mmol/L    Potassium 4.6 3.4 - 5.3 mmol/L    Carbon Dioxide (CO2) 29 22 - 29 mmol/L    Anion Gap 8 7 - 15 mmol/L    Urea Nitrogen 10.9 8.0 - 23.0 mg/dL    Creatinine 0.90 0.67 - 1.17 mg/dL    GFR Estimate >90 >60 mL/min/1.73m2    Calcium 9.7 8.8 - 10.4 mg/dL    Chloride 101 98 - 107 mmol/L    Glucose 113 (H) 70 - 99 mg/dL    Alkaline Phosphatase 104 40 - 150 U/L    AST 20 0 - 45 U/L    ALT 16 0 - 70 U/L    Protein Total 7.8 6.4 - 8.3 g/dL    Albumin 4.5 3.5 - 5.2 g/dL    Bilirubin Total 0.5 <=1.2 mg/dL    Patient Fasting > 8hrs? Yes    T4 free     Status: Normal   Result Value Ref Range    Free T4 1.15  0.90 - 1.70 ng/dL      Aspects of Diabetes:   Recent Labs   Lab Test 07/24/24  0659 07/11/24  0906 04/10/24  0658 01/09/24  0644 09/07/23  0655   A1C 8.0*  --  7.8* 7.4* 7.5*   LDL 72  --  69 75 63   HDL 44  --  44 49 45   TRIG 63  --  62 50 64   ALT 16  --  21  --  22   CR 0.90  --  0.88 0.96 0.88   GFRESTIMATED >90  --  >90 86 >90   POTASSIUM 4.6  --  4.4 4.8 4.6   TSH 7.14*  --  6.59* 6.15* 6.44*   T4 1.15  --  1.11 1.12 1.11   WBC 7.7 8.9 8.2 8.2 7.6   HGB 16.2 15.2 15.3 15.6 15.9    242 222 240 221   ALBUMIN 4.5  --  4.4  --  4.6      Hemoglobin A1c  Goal range is under 8%. Best is 6.5 to 7   Blood Pressure 134/78 Goal to keep less than 140/90   Tobacco  reports that he has never smoked. He has never used smokeless tobacco. Goal to abstain from tobacco   Aspirin or Plavix Anti-platelet therapy Aspirin or Plavix reduces risk of heart disease and stroke  -- sometimes used with other blood thinners, depending on bleeding risk and risk factors.    ACE/ARB Specific blood pressure meds These medications can reduce risk of kidney disease   Cholesterol Statins (Lipitor, Crestor, vs others) Statins reduce risk of heart disease and stroke   Eye Exam -- Do Yearly -- Annual diabetic eye exam   Healthy weight Wt Readings from Last 4 Encounters:   07/24/24 87.5 kg (193 lb)   07/11/24 87.4 kg (192 lb 9.6 oz)   04/10/24 88.2 kg (194 lb 8 oz)   01/09/24 88.9 kg (196 lb)      Body mass index is 28.09 kg/m .  Goal BMI under 30, best is under 25.      -- Trying to exercise daily (goal at least 20 min/day) with moderate aerobic activity   -- Eat healthy (resources from ADA at http://www.diabetes.org/)   -- Taking good care of my feet. Consider seeing the Podiatrist   -- Check blood sugars as directed, record in log book and bring to every appointment    Insurance companies are grading you and I on your blood sugar control -- Goal is to get your A1c down to 7.9% or lower and NO Smoking!  -- Medicare and most insurance  companies, will only cover Hemoglobin A1c labs to be rechecked every 91+ days.      Return for Diabetes labs and clinic follow-up appointment every 3 to 4 months.    Schedule lab only appointment --- A few days AFTER: 10/22/24   Schedule clinic appointment with Dr. Wilson -- Same day as labs, or 1-2 days later.

## 2024-07-24 NOTE — NURSING NOTE
"Chief Complaint   Patient presents with    Medicare Visit    Diabetes       Initial BP (!) 162/78   Pulse 84   Temp 97.6  F (36.4  C) (Tympanic)   Resp 16   Ht 1.765 m (5' 9.5\")   Wt 87.5 kg (193 lb)   SpO2 98%   BMI 28.09 kg/m   Estimated body mass index is 28.09 kg/m  as calculated from the following:    Height as of this encounter: 1.765 m (5' 9.5\").    Weight as of this encounter: 87.5 kg (193 lb).  Medication Review: complete    The next two questions are to help us understand your food security.  If you are feeling you need any assistance in this area, we have resources available to support you today.          7/24/2024   SDOH- Food Insecurity   Within the past 12 months, did you worry that your food would run out before you got money to buy more? N   Within the past 12 months, did the food you bought just not last and you didn t have money to get more? N            Health Care Directive:  Patient has a Health Care Directive on file      Sasha Lea CMA      "

## 2024-10-31 ENCOUNTER — OFFICE VISIT (OUTPATIENT)
Dept: INTERNAL MEDICINE | Facility: OTHER | Age: 69
End: 2024-10-31
Attending: INTERNAL MEDICINE
Payer: MEDICARE

## 2024-10-31 ENCOUNTER — LAB (OUTPATIENT)
Dept: LAB | Facility: OTHER | Age: 69
End: 2024-10-31
Attending: INTERNAL MEDICINE
Payer: COMMERCIAL

## 2024-10-31 VITALS
BODY MASS INDEX: 26.77 KG/M2 | DIASTOLIC BLOOD PRESSURE: 88 MMHG | HEIGHT: 70 IN | SYSTOLIC BLOOD PRESSURE: 138 MMHG | TEMPERATURE: 97.7 F | HEART RATE: 86 BPM | WEIGHT: 187 LBS | OXYGEN SATURATION: 99 % | RESPIRATION RATE: 20 BRPM

## 2024-10-31 DIAGNOSIS — Z79.4 CONTROLLED TYPE 2 DIABETES MELLITUS WITH COMPLICATION, WITH LONG-TERM CURRENT USE OF INSULIN (H): ICD-10-CM

## 2024-10-31 DIAGNOSIS — E78.2 MIXED HYPERLIPIDEMIA: ICD-10-CM

## 2024-10-31 DIAGNOSIS — E11.42 DIABETIC POLYNEUROPATHY ASSOCIATED WITH TYPE 2 DIABETES MELLITUS (H): ICD-10-CM

## 2024-10-31 DIAGNOSIS — Z79.4 CONTROLLED TYPE 2 DIABETES MELLITUS WITH COMPLICATION, WITH LONG-TERM CURRENT USE OF INSULIN (H): Primary | ICD-10-CM

## 2024-10-31 DIAGNOSIS — E11.8 CONTROLLED TYPE 2 DIABETES MELLITUS WITH COMPLICATION, WITH LONG-TERM CURRENT USE OF INSULIN (H): Primary | ICD-10-CM

## 2024-10-31 DIAGNOSIS — E11.3292 MILD NONPROLIFERATIVE DIABETIC RETINOPATHY OF LEFT EYE WITHOUT MACULAR EDEMA ASSOCIATED WITH TYPE 2 DIABETES MELLITUS (H): ICD-10-CM

## 2024-10-31 DIAGNOSIS — E11.8 CONTROLLED TYPE 2 DIABETES MELLITUS WITH COMPLICATION, WITH LONG-TERM CURRENT USE OF INSULIN (H): ICD-10-CM

## 2024-10-31 DIAGNOSIS — I10 BENIGN ESSENTIAL HYPERTENSION: ICD-10-CM

## 2024-10-31 LAB
ALBUMIN SERPL BCG-MCNC: 4.5 G/DL (ref 3.5–5.2)
ALBUMIN UR-MCNC: NEGATIVE MG/DL
ALP SERPL-CCNC: 93 U/L (ref 40–150)
ALT SERPL W P-5'-P-CCNC: 16 U/L (ref 0–70)
ANION GAP SERPL CALCULATED.3IONS-SCNC: 9 MMOL/L (ref 7–15)
APPEARANCE UR: CLEAR
AST SERPL W P-5'-P-CCNC: 18 U/L (ref 0–45)
BILIRUB SERPL-MCNC: 0.8 MG/DL
BILIRUB UR QL STRIP: NEGATIVE
BUN SERPL-MCNC: 14.3 MG/DL (ref 8–23)
CALCIUM SERPL-MCNC: 9.7 MG/DL (ref 8.8–10.4)
CHLORIDE SERPL-SCNC: 100 MMOL/L (ref 98–107)
CHOLEST SERPL-MCNC: 120 MG/DL
COLOR UR AUTO: YELLOW
CREAT SERPL-MCNC: 0.87 MG/DL (ref 0.67–1.17)
CREAT UR-MCNC: 23.3 MG/DL
EGFRCR SERPLBLD CKD-EPI 2021: >90 ML/MIN/1.73M2
ERYTHROCYTE [DISTWIDTH] IN BLOOD BY AUTOMATED COUNT: 13.8 % (ref 10–15)
EST. AVERAGE GLUCOSE BLD GHB EST-MCNC: 143 MG/DL
FASTING STATUS PATIENT QL REPORTED: NORMAL
FASTING STATUS PATIENT QL REPORTED: NORMAL
GLUCOSE SERPL-MCNC: 98 MG/DL (ref 70–99)
GLUCOSE UR STRIP-MCNC: NEGATIVE MG/DL
HBA1C MFR BLD: 6.6 %
HCO3 SERPL-SCNC: 28 MMOL/L (ref 22–29)
HCT VFR BLD AUTO: 46 % (ref 40–53)
HDLC SERPL-MCNC: 45 MG/DL
HGB BLD-MCNC: 15.3 G/DL (ref 13.3–17.7)
HGB UR QL STRIP: NEGATIVE
KETONES UR STRIP-MCNC: NEGATIVE MG/DL
LDLC SERPL CALC-MCNC: 62 MG/DL
LEUKOCYTE ESTERASE UR QL STRIP: NEGATIVE
MCH RBC QN AUTO: 29.5 PG (ref 26.5–33)
MCHC RBC AUTO-ENTMCNC: 33.3 G/DL (ref 31.5–36.5)
MCV RBC AUTO: 89 FL (ref 78–100)
MICROALBUMIN UR-MCNC: <12 MG/L
MICROALBUMIN/CREAT UR: NORMAL MG/G{CREAT}
NITRATE UR QL: NEGATIVE
NONHDLC SERPL-MCNC: 75 MG/DL
PH UR STRIP: 7 [PH] (ref 5–9)
PLATELET # BLD AUTO: 265 10E3/UL (ref 150–450)
POTASSIUM SERPL-SCNC: 4.5 MMOL/L (ref 3.4–5.3)
PROT SERPL-MCNC: 7.7 G/DL (ref 6.4–8.3)
RBC # BLD AUTO: 5.19 10E6/UL (ref 4.4–5.9)
SODIUM SERPL-SCNC: 137 MMOL/L (ref 135–145)
SP GR UR STRIP: 1 (ref 1–1.03)
T4 FREE SERPL-MCNC: 1.15 NG/DL (ref 0.9–1.7)
TRIGL SERPL-MCNC: 67 MG/DL
TSH SERPL DL<=0.005 MIU/L-ACNC: 6.05 UIU/ML (ref 0.3–4.2)
UROBILINOGEN UR STRIP-MCNC: NORMAL MG/DL
WBC # BLD AUTO: 8 10E3/UL (ref 4–11)

## 2024-10-31 PROCEDURE — 82947 ASSAY GLUCOSE BLOOD QUANT: CPT | Mod: ZL

## 2024-10-31 PROCEDURE — 99214 OFFICE O/P EST MOD 30 MIN: CPT | Performed by: INTERNAL MEDICINE

## 2024-10-31 PROCEDURE — 84439 ASSAY OF FREE THYROXINE: CPT | Mod: ZL

## 2024-10-31 PROCEDURE — G2211 COMPLEX E/M VISIT ADD ON: HCPCS | Performed by: INTERNAL MEDICINE

## 2024-10-31 PROCEDURE — 36415 COLL VENOUS BLD VENIPUNCTURE: CPT | Mod: ZL

## 2024-10-31 PROCEDURE — 84443 ASSAY THYROID STIM HORMONE: CPT | Mod: ZL

## 2024-10-31 PROCEDURE — 83036 HEMOGLOBIN GLYCOSYLATED A1C: CPT | Mod: ZL

## 2024-10-31 PROCEDURE — 82043 UR ALBUMIN QUANTITATIVE: CPT | Mod: ZL

## 2024-10-31 PROCEDURE — 80061 LIPID PANEL: CPT | Mod: ZL

## 2024-10-31 PROCEDURE — 85027 COMPLETE CBC AUTOMATED: CPT | Mod: ZL

## 2024-10-31 PROCEDURE — G0463 HOSPITAL OUTPT CLINIC VISIT: HCPCS

## 2024-10-31 PROCEDURE — 81003 URINALYSIS AUTO W/O SCOPE: CPT | Mod: ZL

## 2024-10-31 RX ORDER — ACYCLOVIR 400 MG/1
1 TABLET ORAL
Qty: 3 EACH | Refills: 11 | Status: SHIPPED | OUTPATIENT
Start: 2024-10-31

## 2024-10-31 RX ORDER — ACYCLOVIR 400 MG/1
1 TABLET ORAL DAILY
Qty: 1 EACH | Refills: 0 | Status: SHIPPED | OUTPATIENT
Start: 2024-10-31

## 2024-10-31 ASSESSMENT — ENCOUNTER SYMPTOMS
DIZZINESS: 0
CONFUSION: 0
DIARRHEA: 0
VOMITING: 0
EYE PAIN: 0
COUGH: 0
LIGHT-HEADEDNESS: 0
HEMATURIA: 0
WHEEZING: 0
PALPITATIONS: 0
SHORTNESS OF BREATH: 0
FATIGUE: 0
MYALGIAS: 0
CHILLS: 0
ABDOMINAL PAIN: 0
DYSURIA: 0
ARTHRALGIAS: 0
BRUISES/BLEEDS EASILY: 0
AGITATION: 0
NAUSEA: 0
FEVER: 0
NUMBNESS: 1

## 2024-10-31 ASSESSMENT — PAIN SCALES - GENERAL: PAINLEVEL_OUTOF10: SEVERE PAIN (7)

## 2024-10-31 NOTE — PROGRESS NOTES
Assessment & Plan     ICD-10-CM    1. Controlled type 2 diabetes mellitus with complication, with long-term current use of insulin (H)  E11.8 Continuous Glucose  (DEXCOM G7 ) JACKSON    Z79.4 Continuous Glucose Sensor (DEXCOM G7 SENSOR) MISC     metFORMIN (GLUCOPHAGE) 1000 MG tablet      2. Benign essential hypertension  I10       3. Mixed hyperlipidemia  E78.2       4. Diabetic polyneuropathy associated with type 2 diabetes mellitus (H)  E11.42       5. Mild nonproliferative diabetic retinopathy of left eye without macular edema associated with type 2 diabetes mellitus (H)  E11.3292          Patient presents for follow-up multiple issues.    Has been doing very well with his new Dexcom G7.  Blood sugars have been significantly improved.  Certain foods were causing his blood sugars to spike, has been trying to avoid or limit those.  Overall doing very well.  Hemoglobin A1c has improved significantly.  See below.  Dexcom G7 refill sent to pharmacy.  Continue metformin, refill sent to pharmacy.  Currently using Lantus insulin, glipizide.    Diabetic polyneuropathy, mild, chronic.  More numbness and burning at this time.    Mild nonproliferative retinopathy.  Left eye.  Follows regularly with his eye doctor.  Symptoms have stabilized.  Encouraged continued blood pressure control, glucose control.    HYPERTENSION - Ongoing. Blood pressure is currently well controlled.  Medication side effects: None. Denies syncope or presyncope.  Continue current medications.   Medication list reviewed/updated. Refills completed as needed.      MIXED HYPERLIPIDEMIA.  Ongoing. LDL is at goal: Yes. Triglycerides are at goal: Yes.    Medication side effects reported: None.   Continue current medications for now. Medication list reviewed/updated. Refills completed as needed.  Recent Labs   Lab Test 10/31/24  0646 07/24/24  0659   CHOL 120 129   HDL 45 44   LDL 62 72   TRIG 67 63        Vaccine counseling completed.  Encourage  routine / annual vaccinations.      Type 2 Diabetes Mellitus, with retinopathy, with neuropathy, Reports ongoing/previous: numbness and burning.  Blood sugar control has been good with minimal hyperglycemia. Doing well with diet, oral agents, exercise, and intensive insulin injection program.  Medication list reviewed/updated. Refills completed as needed.    Complicating factors include but are not limited to: hypertension, hyperlipidemia, retinopathy, and neuropathy.     Recent Labs   Lab Test 10/31/24  0646 07/24/24  0659 07/11/24  0906 04/10/24  0658   A1C 6.6* 8.0*  --  7.8*   LDL 62 72  --  69   HDL 45 44  --  44   TRIG 67 63  --  62   ALT 16 16  --  21   CR 0.87 0.90  --  0.88   GFRESTIMATED >90 >90  --  >90   POTASSIUM 4.5 4.6  --  4.4   TSH 6.05* 7.14*  --  6.59*   T4 1.15 1.15  --  1.11   WBC 8.0 7.7   < > 8.2   HGB 15.3 16.2   < > 15.3    230   < > 222   ALBUMIN 4.5 4.5  --  4.4    < > = values in this interval not displayed.     Hemoglobin A1c is well-controlled.  LDL HDL and triglycerides are at goal.  ALT normal.  Creatinine is at baseline per potassium normal.  TSH is slightly elevated but free T4 is within normal limits.  CBC normal.  Albumin normal.     The longitudinal plan of care for the diagnosis(es)/condition(s) as documented were addressed during this visit. Due to the added complexity in care, I will continue to support Vicente in the subsequent management and with ongoing continuity of care.      This patient has been using and benefiting from using Dexcom continuous glucose monitoring system. I therefore recommended Dexcom continuous glucose monitoring as part of their comprehensive diabetes treatment plan, in order to help them:    lower their HbA1c to target and/or maintain their HbA1c at target    to alert patient prior to acute complications of hyper/hypoglycemia    to detect hypoglycemia, and/or to reduce hypoglycemia    increase their short term and long-term safety    improve  "quality of life    start/continue/intensify level of physical activity safely    improve insulin sensitivity by reducing hypoglycemia unawareness.             BMI  Estimated body mass index is 27.22 kg/m  as calculated from the following:    Height as of this encounter: 1.765 m (5' 9.5\").    Weight as of this encounter: 84.8 kg (187 lb).         Return in about 3 months (around 1/31/2025) for - Labs every 91+ days, with DM Follow-up, Same Day or 1-2 days later with Dr. Wilson.      Moshe Wilson MD  [facility]    Review of Systems   Constitutional:  Negative for chills, fatigue and fever.   HENT:  Negative for congestion and hearing loss.    Eyes:  Negative for pain and visual disturbance.   Respiratory:  Negative for cough, shortness of breath and wheezing.    Cardiovascular:  Negative for chest pain and palpitations.   Gastrointestinal:  Negative for abdominal pain, diarrhea, nausea and vomiting.   Endocrine: Negative for cold intolerance and heat intolerance.        + Much less Hyperglycemia   Genitourinary:  Negative for dysuria and hematuria.   Musculoskeletal:  Negative for arthralgias and myalgias.        Right 4th finger, in palm. starting to develop trigger finger, improved with regular home therapy   Skin:  Negative for pallor.   Allergic/Immunologic: Negative for immunocompromised state.   Neurological:  Positive for numbness (+ bilateral feet, comes and goes -- better with better glucose control). Negative for dizziness and light-headedness.   Hematological:  Does not bruise/bleed easily.   Psychiatric/Behavioral:  Negative for agitation and confusion.          Subjective   Vicente is a 69 year old, presenting for the following health issues:  Follow Up (Diabetic check)        10/31/2024     7:55 AM   Additional Questions   Roomed by Meena WU LPN     History of Present Illness       He eats 4 or more servings of fruits and vegetables daily.He consumes 0 sweetened beverage(s) " "daily.He exercises with enough effort to increase his heart rate 60 or more minutes per day.  He exercises with enough effort to increase his heart rate 7 days per week.   He is taking medications regularly.                     Objective    /88 (BP Location: Left arm, Patient Position: Sitting, Cuff Size: Adult Large)   Pulse 86   Temp 97.7  F (36.5  C) (Temporal)   Resp 20   Ht 1.765 m (5' 9.5\")   Wt 84.8 kg (187 lb)   SpO2 99%   BMI 27.22 kg/m    Body mass index is 27.22 kg/m .  Physical Exam  Constitutional:       General: He is not in acute distress.     Appearance: He is well-developed. He is not diaphoretic.   Eyes:      General: No scleral icterus.     Conjunctiva/sclera: Conjunctivae normal.   Neck:      Vascular: No carotid bruit.   Cardiovascular:      Rate and Rhythm: Normal rate and regular rhythm.      Pulses: Normal pulses.   Pulmonary:      Effort: Pulmonary effort is normal.      Breath sounds: Normal breath sounds.   Abdominal:      Palpations: Abdomen is soft.      Tenderness: There is no abdominal tenderness.   Musculoskeletal:         General: No tenderness or deformity.      Cervical back: Neck supple.      Right lower leg: No edema.      Left lower leg: No edema.   Skin:     General: Skin is warm and dry.      Findings: No rash.      Comments: Start of contracture of right hand - 4th finger.   Neurological:      Mental Status: He is alert. Mental status is at baseline.   Psychiatric:         Mood and Affect: Mood normal.         Behavior: Behavior normal.                    Signed Electronically by: Moshe Wilson MD    "

## 2024-10-31 NOTE — NURSING NOTE
"Patient presents to clinic today for diabetic check.    Chief Complaint   Patient presents with    Follow Up     Diabetic check       FOOD SECURITY SCREENING QUESTIONS  Hunger Vital Signs:  Within the past 12 months we worried whether our food would run out before we got money to buy more. Never  Within the past 12 months the food we bought just didn't last and we didn't have money to get more. Never  Meena Plummer 10/31/2024 8:02 AM      Initial BP (!) 144/88 (BP Location: Left arm, Patient Position: Sitting, Cuff Size: Adult Large)   Pulse 86   Temp 97.7  F (36.5  C) (Temporal)   Resp 20   Ht 1.765 m (5' 9.5\")   Wt 84.8 kg (187 lb)   SpO2 99%   BMI 27.22 kg/m   Estimated body mass index is 27.22 kg/m  as calculated from the following:    Height as of this encounter: 1.765 m (5' 9.5\").    Weight as of this encounter: 84.8 kg (187 lb).  Medication Reconciliation: complete    Meena Plummer  "

## 2024-10-31 NOTE — PATIENT INSTRUCTIONS
Blood pressure is well controlled.     Diabetes is once again well controlled.  Keep up the hard work!      To help with weight loss and improve blood sugar control....    -- Try to avoid Carbohydrates as much as possible -- breads, pasta, baked goods, cakes, oatmeal, cold cereal, potatoes.   -- Eat more lean meats, proteins, eggs, nuts, vegetables.    -- Start or Continue regular daily exercise.     Get out and exercise, bike ride, walk for 10 to 15 minutes after each meal -- this can significantly lowers the spike in blood sugar after eating.     Medications refilled.   Labs are much improved.     Results for orders placed or performed in visit on 10/31/24   Urine Macroscopic with reflex to Microscopic     Status: Normal   Result Value Ref Range    Color Urine Yellow Colorless, Straw, Light Yellow, Yellow    Appearance Urine Clear Clear    Glucose Urine Negative Negative mg/dL    Bilirubin Urine Negative Negative    Ketones Urine Negative Negative mg/dL    Specific Gravity Urine 1.005 1.000 - 1.030    Blood Urine Negative Negative    pH Urine 7.0 5.0 - 9.0    Protein Albumin Urine Negative Negative mg/dL    Urobilinogen Urine Normal Normal, 2.0 mg/dL    Nitrite Urine Negative Negative    Leukocyte Esterase Urine Negative Negative    Narrative    Microscopic not indicated   TSH with free T4 reflex     Status: Abnormal   Result Value Ref Range    TSH 6.05 (H) 0.30 - 4.20 uIU/mL   Hemoglobin A1c     Status: Abnormal   Result Value Ref Range    Estimated Average Glucose 143 (H) <117 mg/dL    Hemoglobin A1C 6.6 (H) <5.7 %   CBC with platelets     Status: Normal   Result Value Ref Range    WBC Count 8.0 4.0 - 11.0 10e3/uL    RBC Count 5.19 4.40 - 5.90 10e6/uL    Hemoglobin 15.3 13.3 - 17.7 g/dL    Hematocrit 46.0 40.0 - 53.0 %    MCV 89 78 - 100 fL    MCH 29.5 26.5 - 33.0 pg    MCHC 33.3 31.5 - 36.5 g/dL    RDW 13.8 10.0 - 15.0 %    Platelet Count 265 150 - 450 10e3/uL   Albumin Random Urine Quantitative with Creat  Ratio     Status: None   Result Value Ref Range    Creatinine Urine mg/dL 23.3 mg/dL    Albumin Urine mg/L <12.0 mg/L    Albumin Urine mg/g Cr     Lipid Profile     Status: None   Result Value Ref Range    Cholesterol 120 <200 mg/dL    Triglycerides 67 <150 mg/dL    Direct Measure HDL 45 >=40 mg/dL    LDL Cholesterol Calculated 62 <100 mg/dL    Non HDL Cholesterol 75 <130 mg/dL    Patient Fasting > 8hrs? Unknown     Narrative    Cholesterol  Desirable: < 200 mg/dL  Borderline High: 200 - 239 mg/dL  High: >= 240 mg/dL    Triglycerides  Normal: < 150 mg/dL  Borderline High: 150 - 199 mg/dL  High: 200-499 mg/dL  Very High: >= 500 mg/dL    Direct Measure HDL  Female: >= 50 mg/dL   Male: >= 40 mg/dL    LDL Cholesterol  Desirable: < 100 mg/dL  Above Desirable: 100 - 129 mg/dL   Borderline High: 130 - 159 mg/dL   High:  160 - 189 mg/dL   Very High: >= 190 mg/dL    Non HDL Cholesterol  Desirable: < 130 mg/dL  Above Desirable: 130 - 159 mg/dL  Borderline High: 160 - 189 mg/dL  High: 190 - 219 mg/dL  Very High: >= 220 mg/dL   Comprehensive metabolic panel     Status: None   Result Value Ref Range    Sodium 137 135 - 145 mmol/L    Potassium 4.5 3.4 - 5.3 mmol/L    Carbon Dioxide (CO2) 28 22 - 29 mmol/L    Anion Gap 9 7 - 15 mmol/L    Urea Nitrogen 14.3 8.0 - 23.0 mg/dL    Creatinine 0.87 0.67 - 1.17 mg/dL    GFR Estimate >90 >60 mL/min/1.73m2    Calcium 9.7 8.8 - 10.4 mg/dL    Chloride 100 98 - 107 mmol/L    Glucose 98 70 - 99 mg/dL    Alkaline Phosphatase 93 40 - 150 U/L    AST 18 0 - 45 U/L    ALT 16 0 - 70 U/L    Protein Total 7.7 6.4 - 8.3 g/dL    Albumin 4.5 3.5 - 5.2 g/dL    Bilirubin Total 0.8 <=1.2 mg/dL    Patient Fasting > 8hrs? Unknown    T4 free     Status: Normal   Result Value Ref Range    Free T4 1.15 0.90 - 1.70 ng/dL      Aspects of Diabetes:   Recent Labs   Lab Test 10/31/24  0646 07/24/24  0659 07/11/24  0906 04/10/24  0658   A1C 6.6* 8.0*  --  7.8*   LDL 62 72  --  69   HDL 45 44  --  44   TRIG 67 63  --   62   ALT 16 16  --  21   CR 0.87 0.90  --  0.88   GFRESTIMATED >90 >90  --  >90   POTASSIUM 4.5 4.6  --  4.4   TSH 6.05* 7.14*  --  6.59*   T4 1.15 1.15  --  1.11   WBC 8.0 7.7   < > 8.2   HGB 15.3 16.2   < > 15.3    230   < > 222   ALBUMIN 4.5 4.5  --  4.4    < > = values in this interval not displayed.      Hemoglobin A1c  Goal range is under 8%. Best is 6.5 to 7   Blood Pressure 138/88 Goal to keep less than 140/90   Tobacco  reports that he has never smoked. He has never used smokeless tobacco. Goal to abstain from tobacco   Aspirin or Plavix Anti-platelet therapy Aspirin or Plavix reduces risk of heart disease and stroke  -- sometimes used with other blood thinners, depending on bleeding risk and risk factors.    ACE/ARB Specific blood pressure meds These medications can reduce risk of kidney disease   Cholesterol Statins (Lipitor, Crestor, vs others) Statins reduce risk of heart disease and stroke   Eye Exam -- Do Yearly -- Annual diabetic eye exam   Healthy weight Wt Readings from Last 4 Encounters:   10/31/24 84.8 kg (187 lb)   07/24/24 87.5 kg (193 lb)   07/11/24 87.4 kg (192 lb 9.6 oz)   04/10/24 88.2 kg (194 lb 8 oz)      Body mass index is 27.22 kg/m .  Goal BMI under 30, best is under 25.      -- Trying to exercise daily (goal at least 20 min/day) with moderate aerobic activity   -- Eat healthy (resources from ADA at http://www.diabetes.org/)   -- Taking good care of my feet. Consider seeing the Podiatrist   -- Check blood sugars as directed, record in log book and bring to every appointment    Insurance companies are grading you and I on your blood sugar control -- Goal is to get your A1c down to 7.9% or lower and NO Smoking!  -- Medicare and most insurance companies, will only cover Hemoglobin A1c labs to be rechecked every 91+ days.      Return for Diabetes labs and clinic follow-up appointment every 3 to 4 months.    Schedule lab only appointment --- A few days AFTER: 1/29/25   Schedule  clinic appointment with Dr. Wilson -- Same day as labs, or 1-2 days later.

## 2025-02-11 ENCOUNTER — OFFICE VISIT (OUTPATIENT)
Dept: INTERNAL MEDICINE | Facility: OTHER | Age: 70
End: 2025-02-11
Attending: INTERNAL MEDICINE
Payer: MEDICARE

## 2025-02-11 ENCOUNTER — LAB (OUTPATIENT)
Dept: LAB | Facility: OTHER | Age: 70
End: 2025-02-11
Attending: INTERNAL MEDICINE
Payer: MEDICARE

## 2025-02-11 VITALS
RESPIRATION RATE: 16 BRPM | HEART RATE: 86 BPM | DIASTOLIC BLOOD PRESSURE: 84 MMHG | BODY MASS INDEX: 28.24 KG/M2 | TEMPERATURE: 97.3 F | SYSTOLIC BLOOD PRESSURE: 138 MMHG | WEIGHT: 194 LBS | OXYGEN SATURATION: 98 %

## 2025-02-11 DIAGNOSIS — E11.42 DIABETIC POLYNEUROPATHY ASSOCIATED WITH TYPE 2 DIABETES MELLITUS (H): ICD-10-CM

## 2025-02-11 DIAGNOSIS — Z79.4 CONTROLLED TYPE 2 DIABETES MELLITUS WITH COMPLICATION, WITH LONG-TERM CURRENT USE OF INSULIN (H): ICD-10-CM

## 2025-02-11 DIAGNOSIS — E11.8 CONTROLLED TYPE 2 DIABETES MELLITUS WITH COMPLICATION, WITH LONG-TERM CURRENT USE OF INSULIN (H): ICD-10-CM

## 2025-02-11 DIAGNOSIS — E78.2 MIXED HYPERLIPIDEMIA: ICD-10-CM

## 2025-02-11 DIAGNOSIS — Z79.4 CONTROLLED TYPE 2 DIABETES MELLITUS WITH COMPLICATION, WITH LONG-TERM CURRENT USE OF INSULIN (H): Primary | ICD-10-CM

## 2025-02-11 DIAGNOSIS — E11.8 CONTROLLED TYPE 2 DIABETES MELLITUS WITH COMPLICATION, WITH LONG-TERM CURRENT USE OF INSULIN (H): Primary | ICD-10-CM

## 2025-02-11 DIAGNOSIS — I10 BENIGN ESSENTIAL HYPERTENSION: ICD-10-CM

## 2025-02-11 LAB
ALBUMIN SERPL BCG-MCNC: 4.6 G/DL (ref 3.5–5.2)
ALBUMIN UR-MCNC: NEGATIVE MG/DL
ALP SERPL-CCNC: 92 U/L (ref 40–150)
ALT SERPL W P-5'-P-CCNC: 24 U/L (ref 0–70)
ANION GAP SERPL CALCULATED.3IONS-SCNC: 12 MMOL/L (ref 7–15)
APPEARANCE UR: CLEAR
AST SERPL W P-5'-P-CCNC: 26 U/L (ref 0–45)
BILIRUB SERPL-MCNC: 0.4 MG/DL
BILIRUB UR QL STRIP: NEGATIVE
BUN SERPL-MCNC: 14.9 MG/DL (ref 8–23)
CALCIUM SERPL-MCNC: 9.7 MG/DL (ref 8.8–10.4)
CHLORIDE SERPL-SCNC: 102 MMOL/L (ref 98–107)
CHOLEST SERPL-MCNC: 124 MG/DL
COLOR UR AUTO: YELLOW
CREAT SERPL-MCNC: 0.88 MG/DL (ref 0.67–1.17)
CREAT UR-MCNC: 18.8 MG/DL
EGFRCR SERPLBLD CKD-EPI 2021: >90 ML/MIN/1.73M2
ERYTHROCYTE [DISTWIDTH] IN BLOOD BY AUTOMATED COUNT: 13.5 % (ref 10–15)
EST. AVERAGE GLUCOSE BLD GHB EST-MCNC: 143 MG/DL
FASTING STATUS PATIENT QL REPORTED: YES
FASTING STATUS PATIENT QL REPORTED: YES
GLUCOSE SERPL-MCNC: 135 MG/DL (ref 70–99)
GLUCOSE UR STRIP-MCNC: NEGATIVE MG/DL
HBA1C MFR BLD: 6.6 %
HCO3 SERPL-SCNC: 26 MMOL/L (ref 22–29)
HCT VFR BLD AUTO: 45.5 % (ref 40–53)
HDLC SERPL-MCNC: 46 MG/DL
HGB BLD-MCNC: 15.5 G/DL (ref 13.3–17.7)
HGB UR QL STRIP: NEGATIVE
KETONES UR STRIP-MCNC: NEGATIVE MG/DL
LDLC SERPL CALC-MCNC: 66 MG/DL
LEUKOCYTE ESTERASE UR QL STRIP: NEGATIVE
MCH RBC QN AUTO: 29.8 PG (ref 26.5–33)
MCHC RBC AUTO-ENTMCNC: 34.1 G/DL (ref 31.5–36.5)
MCV RBC AUTO: 88 FL (ref 78–100)
MICROALBUMIN UR-MCNC: <12 MG/L
MICROALBUMIN/CREAT UR: NORMAL MG/G{CREAT}
NITRATE UR QL: NEGATIVE
NONHDLC SERPL-MCNC: 78 MG/DL
PH UR STRIP: 7.5 [PH] (ref 5–9)
PLATELET # BLD AUTO: 240 10E3/UL (ref 150–450)
POTASSIUM SERPL-SCNC: 4.9 MMOL/L (ref 3.4–5.3)
PROT SERPL-MCNC: 7.8 G/DL (ref 6.4–8.3)
RBC # BLD AUTO: 5.2 10E6/UL (ref 4.4–5.9)
SODIUM SERPL-SCNC: 140 MMOL/L (ref 135–145)
SP GR UR STRIP: 1.01 (ref 1–1.03)
T4 FREE SERPL-MCNC: 1.13 NG/DL (ref 0.9–1.7)
TRIGL SERPL-MCNC: 61 MG/DL
TSH SERPL DL<=0.005 MIU/L-ACNC: 5.85 UIU/ML (ref 0.3–4.2)
UROBILINOGEN UR STRIP-MCNC: NORMAL MG/DL
WBC # BLD AUTO: 7.3 10E3/UL (ref 4–11)

## 2025-02-11 PROCEDURE — 36415 COLL VENOUS BLD VENIPUNCTURE: CPT | Mod: ZL

## 2025-02-11 PROCEDURE — 83718 ASSAY OF LIPOPROTEIN: CPT | Mod: ZL

## 2025-02-11 PROCEDURE — G0463 HOSPITAL OUTPT CLINIC VISIT: HCPCS

## 2025-02-11 PROCEDURE — 85041 AUTOMATED RBC COUNT: CPT | Mod: ZL

## 2025-02-11 PROCEDURE — 84443 ASSAY THYROID STIM HORMONE: CPT | Mod: ZL

## 2025-02-11 PROCEDURE — 81003 URINALYSIS AUTO W/O SCOPE: CPT | Mod: ZL

## 2025-02-11 PROCEDURE — 84295 ASSAY OF SERUM SODIUM: CPT | Mod: ZL

## 2025-02-11 PROCEDURE — 83036 HEMOGLOBIN GLYCOSYLATED A1C: CPT | Mod: ZL

## 2025-02-11 PROCEDURE — 82570 ASSAY OF URINE CREATININE: CPT | Mod: ZL

## 2025-02-11 PROCEDURE — 84439 ASSAY OF FREE THYROXINE: CPT | Mod: ZL

## 2025-02-11 RX ORDER — ATORVASTATIN CALCIUM 40 MG/1
40 TABLET, FILM COATED ORAL DAILY
Qty: 90 TABLET | Refills: 4 | Status: SHIPPED | OUTPATIENT
Start: 2025-02-11

## 2025-02-11 RX ORDER — GLIPIZIDE 10 MG/1
TABLET ORAL
Qty: 270 TABLET | Refills: 1 | Status: SHIPPED | OUTPATIENT
Start: 2025-02-11

## 2025-02-11 RX ORDER — ACYCLOVIR 400 MG/1
1 TABLET ORAL
Qty: 3 EACH | Refills: 11 | Status: SHIPPED | OUTPATIENT
Start: 2025-02-11

## 2025-02-11 RX ORDER — INSULIN GLARGINE 100 [IU]/ML
INJECTION, SOLUTION SUBCUTANEOUS
Qty: 15 ML | Refills: 1 | Status: SHIPPED | OUTPATIENT
Start: 2025-02-11

## 2025-02-11 RX ORDER — LOSARTAN POTASSIUM 100 MG/1
100 TABLET ORAL DAILY
Qty: 90 TABLET | Refills: 4 | Status: SHIPPED | OUTPATIENT
Start: 2025-02-11

## 2025-02-11 ASSESSMENT — ENCOUNTER SYMPTOMS
SHORTNESS OF BREATH: 0
LIGHT-HEADEDNESS: 0
CHILLS: 0
ARTHRALGIAS: 0
VOMITING: 0
WHEEZING: 0
EYE PAIN: 0
CONFUSION: 0
COUGH: 0
FEVER: 0
ABDOMINAL PAIN: 0
NUMBNESS: 1
DIARRHEA: 0
BRUISES/BLEEDS EASILY: 0
NAUSEA: 0
AGITATION: 0
HEMATURIA: 0
FATIGUE: 0
PALPITATIONS: 0
MYALGIAS: 0
DYSURIA: 0
DIZZINESS: 0

## 2025-02-11 ASSESSMENT — PAIN SCALES - GENERAL: PAINLEVEL_OUTOF10: MODERATE PAIN (6)

## 2025-02-11 NOTE — NURSING NOTE
"Chief Complaint   Patient presents with    Diabetes       Initial BP (!) 140/84   Pulse 86   Temp 97.3  F (36.3  C) (Temporal)   Resp 16   Wt 88 kg (194 lb)   SpO2 98%   BMI 28.24 kg/m   Estimated body mass index is 28.24 kg/m  as calculated from the following:    Height as of 10/31/24: 1.765 m (5' 9.5\").    Weight as of this encounter: 88 kg (194 lb).  Medication Reconciliation: complete          "

## 2025-02-11 NOTE — PATIENT INSTRUCTIONS
Blood pressure is well controlled.   Diabetes is well controlled.     Medications refilled.   Labs are stable.       Consider getting the Pneumococcal 20 vaccine anytime x1.     Consider getting the Shingles vaccine.... x2 shots... from the pharmacy.       Results for orders placed or performed in visit on 02/11/25   Urine Macroscopic with reflex to Microscopic     Status: Normal   Result Value Ref Range    Color Urine Yellow Colorless, Straw, Light Yellow, Yellow    Appearance Urine Clear Clear    Glucose Urine Negative Negative mg/dL    Bilirubin Urine Negative Negative    Ketones Urine Negative Negative mg/dL    Specific Gravity Urine 1.006 1.000 - 1.030    Blood Urine Negative Negative    pH Urine 7.5 5.0 - 9.0    Protein Albumin Urine Negative Negative mg/dL    Urobilinogen Urine Normal Normal, 2.0 mg/dL    Nitrite Urine Negative Negative    Leukocyte Esterase Urine Negative Negative    Narrative    Microscopic not indicated   TSH with free T4 reflex     Status: Abnormal   Result Value Ref Range    TSH 5.85 (H) 0.30 - 4.20 uIU/mL   Hemoglobin A1c     Status: Abnormal   Result Value Ref Range    Estimated Average Glucose 143 (H) <117 mg/dL    Hemoglobin A1C 6.6 (H) <5.7 %   CBC with platelets     Status: Normal   Result Value Ref Range    WBC Count 7.3 4.0 - 11.0 10e3/uL    RBC Count 5.20 4.40 - 5.90 10e6/uL    Hemoglobin 15.5 13.3 - 17.7 g/dL    Hematocrit 45.5 40.0 - 53.0 %    MCV 88 78 - 100 fL    MCH 29.8 26.5 - 33.0 pg    MCHC 34.1 31.5 - 36.5 g/dL    RDW 13.5 10.0 - 15.0 %    Platelet Count 240 150 - 450 10e3/uL   Albumin Random Urine Quantitative with Creat Ratio     Status: None   Result Value Ref Range    Creatinine Urine mg/dL 18.8 mg/dL    Albumin Urine mg/L <12.0 mg/L    Albumin Urine mg/g Cr     Lipid Profile     Status: None   Result Value Ref Range    Cholesterol 124 <200 mg/dL    Triglycerides 61 <150 mg/dL    Direct Measure HDL 46 >=40 mg/dL    LDL Cholesterol Calculated 66 <100 mg/dL    Non HDL  Cholesterol 78 <130 mg/dL    Patient Fasting > 8hrs? Yes     Narrative    Cholesterol  Desirable: < 200 mg/dL  Borderline High: 200 - 239 mg/dL  High: >= 240 mg/dL    Triglycerides  Normal: < 150 mg/dL  Borderline High: 150 - 199 mg/dL  High: 200-499 mg/dL  Very High: >= 500 mg/dL    Direct Measure HDL  Female: >= 50 mg/dL   Male: >= 40 mg/dL    LDL Cholesterol  Desirable: < 100 mg/dL  Above Desirable: 100 - 129 mg/dL   Borderline High: 130 - 159 mg/dL   High:  160 - 189 mg/dL   Very High: >= 190 mg/dL    Non HDL Cholesterol  Desirable: < 130 mg/dL  Above Desirable: 130 - 159 mg/dL  Borderline High: 160 - 189 mg/dL  High: 190 - 219 mg/dL  Very High: >= 220 mg/dL   Comprehensive metabolic panel     Status: Abnormal   Result Value Ref Range    Sodium 140 135 - 145 mmol/L    Potassium 4.9 3.4 - 5.3 mmol/L    Carbon Dioxide (CO2) 26 22 - 29 mmol/L    Anion Gap 12 7 - 15 mmol/L    Urea Nitrogen 14.9 8.0 - 23.0 mg/dL    Creatinine 0.88 0.67 - 1.17 mg/dL    GFR Estimate >90 >60 mL/min/1.73m2    Calcium 9.7 8.8 - 10.4 mg/dL    Chloride 102 98 - 107 mmol/L    Glucose 135 (H) 70 - 99 mg/dL    Alkaline Phosphatase 92 40 - 150 U/L    AST 26 0 - 45 U/L    ALT 24 0 - 70 U/L    Protein Total 7.8 6.4 - 8.3 g/dL    Albumin 4.6 3.5 - 5.2 g/dL    Bilirubin Total 0.4 <=1.2 mg/dL    Patient Fasting > 8hrs? Yes       Aspects of Diabetes:   Recent Labs   Lab Test 02/11/25  0811 10/31/24  0646 07/24/24  0659   A1C 6.6* 6.6* 8.0*   LDL 66 62 72   HDL 46 45 44   TRIG 61 67 63   ALT 24 16 16   CR 0.88 0.87 0.90   GFRESTIMATED >90 >90 >90   POTASSIUM 4.9 4.5 4.6   TSH 5.85* 6.05* 7.14*   T4  --  1.15 1.15   WBC 7.3 8.0 7.7   HGB 15.5 15.3 16.2    265 230   ALBUMIN 4.6 4.5 4.5      Hemoglobin A1c  Goal range is under 8%. Best is 6.5 to 7   Blood Pressure 138/84 Goal to keep less than 140/90   Tobacco  reports that he has never smoked. He has never used smokeless tobacco. Goal to abstain from tobacco   Aspirin or Plavix Anti-platelet  therapy Aspirin or Plavix reduces risk of heart disease and stroke  -- sometimes used with other blood thinners, depending on bleeding risk and risk factors.    ACE/ARB Specific blood pressure meds These medications can reduce risk of kidney disease   Cholesterol Statins (Lipitor, Crestor, vs others) Statins reduce risk of heart disease and stroke   Eye Exam -- Do Yearly -- Annual diabetic eye exam   Healthy weight Wt Readings from Last 4 Encounters:   02/11/25 88 kg (194 lb)   10/31/24 84.8 kg (187 lb)   07/24/24 87.5 kg (193 lb)   07/11/24 87.4 kg (192 lb 9.6 oz)      Body mass index is 28.24 kg/m .  Goal BMI under 30, best is under 25.      -- Trying to exercise daily (goal at least 20 min/day) with moderate aerobic activity   -- Eat healthy (resources from ADA at http://www.diabetes.org/)   -- Taking good care of my feet. Consider seeing the Podiatrist   -- Check blood sugars as directed, record in log book and bring to every appointment    Insurance companies are grading you and I on your blood sugar control -- Goal is to get your A1c down to 7.9% or lower and NO Smoking!  -- Medicare and most insurance companies, will only cover Hemoglobin A1c labs to be rechecked every 91+ days.      Return for Diabetes labs and clinic follow-up appointment every 3 to 4 months.    Schedule lab only appointment --- A few days AFTER: 5/12/25   Schedule clinic appointment with Dr. Wilson -- Same day as labs, or 1-2 days later.

## 2025-02-11 NOTE — PROGRESS NOTES
Assessment & Plan     ICD-10-CM    1. Controlled type 2 diabetes mellitus with complication, with long-term current use of insulin (H)  E11.8 Continuous Glucose Sensor (DEXCOM G7 SENSOR) MISC    Z79.4 glipiZIDE (GLUCOTROL) 10 MG tablet     insulin glargine (LANTUS SOLOSTAR) 100 UNIT/ML pen     metFORMIN (GLUCOPHAGE) 1000 MG tablet      2. Diabetic polyneuropathy associated with type 2 diabetes mellitus (H)  E11.42       3. Benign essential hypertension  I10 losartan (COZAAR) 100 MG tablet      4. Mixed hyperlipidemia  E78.2 atorvastatin (LIPITOR) 40 MG tablet         Patient presents for diabetes follow-up, as well as follow-up multiple issues.    Patient has ongoing issues with diabetic polyneuropathy.  More numbness and pain at this time.  Declines need for neuropathic pain medication management.  Continues with glipizide, metformin.  Dexcom G7.  Needs refills.  See below.    HYPERTENSION - Ongoing. Blood pressure is currently well controlled.  Medication side effects: None. Denies syncope or presyncope.  Continue current medications.   Medication list reviewed/updated. Refills completed as needed.      MIXED HYPERLIPIDEMIA.  Ongoing. LDL is at goal: Yes. Triglycerides are at goal: Yes.    Medication side effects reported: None.   Continue current medications for now. Medication list reviewed/updated. Refills completed as needed.  Recent Labs   Lab Test 02/11/25  0811 10/31/24  0646   CHOL 124 120   HDL 46 45   LDL 66 62   TRIG 61 67      Vaccine counseling completed.  Encourage routine / annual vaccinations.    Type 2 Diabetes Mellitus, with neuropathy, Reports ongoing/previous: numbness and burning.  Blood sugar control has been good with minimal hyperglycemia. Doing well with diet, oral agents, exercise, and insulin injections - x1 Insulin injections per day.  Medication list reviewed/updated. Refills completed as needed.    Complicating factors include but are not limited to: hypertension, hyperlipidemia, and  neuropathy.     Recent Labs   Lab Test 02/11/25  0811 10/31/24  0646 07/24/24  0659   A1C 6.6* 6.6* 8.0*   LDL 66 62 72   HDL 46 45 44   TRIG 61 67 63   ALT 24 16 16   CR 0.88 0.87 0.90   GFRESTIMATED >90 >90 >90   POTASSIUM 4.9 4.5 4.6   TSH 5.85* 6.05* 7.14*   T4 1.13 1.15 1.15   WBC 7.3 8.0 7.7   HGB 15.5 15.3 16.2    265 230   ALBUMIN 4.6 4.5 4.5     Results for orders placed or performed in visit on 02/11/25   Urine Macroscopic with reflex to Microscopic     Status: Normal   Result Value Ref Range    Color Urine Yellow Colorless, Straw, Light Yellow, Yellow    Appearance Urine Clear Clear    Glucose Urine Negative Negative mg/dL    Bilirubin Urine Negative Negative    Ketones Urine Negative Negative mg/dL    Specific Gravity Urine 1.006 1.000 - 1.030    Blood Urine Negative Negative    pH Urine 7.5 5.0 - 9.0    Protein Albumin Urine Negative Negative mg/dL    Urobilinogen Urine Normal Normal, 2.0 mg/dL    Nitrite Urine Negative Negative    Leukocyte Esterase Urine Negative Negative    Narrative    Microscopic not indicated   TSH with free T4 reflex     Status: Abnormal   Result Value Ref Range    TSH 5.85 (H) 0.30 - 4.20 uIU/mL   Hemoglobin A1c     Status: Abnormal   Result Value Ref Range    Estimated Average Glucose 143 (H) <117 mg/dL    Hemoglobin A1C 6.6 (H) <5.7 %   CBC with platelets     Status: Normal   Result Value Ref Range    WBC Count 7.3 4.0 - 11.0 10e3/uL    RBC Count 5.20 4.40 - 5.90 10e6/uL    Hemoglobin 15.5 13.3 - 17.7 g/dL    Hematocrit 45.5 40.0 - 53.0 %    MCV 88 78 - 100 fL    MCH 29.8 26.5 - 33.0 pg    MCHC 34.1 31.5 - 36.5 g/dL    RDW 13.5 10.0 - 15.0 %    Platelet Count 240 150 - 450 10e3/uL   Albumin Random Urine Quantitative with Creat Ratio     Status: None   Result Value Ref Range    Creatinine Urine mg/dL 18.8 mg/dL    Albumin Urine mg/L <12.0 mg/L    Albumin Urine mg/g Cr     Lipid Profile     Status: None   Result Value Ref Range    Cholesterol 124 <200 mg/dL     Triglycerides 61 <150 mg/dL    Direct Measure HDL 46 >=40 mg/dL    LDL Cholesterol Calculated 66 <100 mg/dL    Non HDL Cholesterol 78 <130 mg/dL    Patient Fasting > 8hrs? Yes     Narrative    Cholesterol  Desirable: < 200 mg/dL  Borderline High: 200 - 239 mg/dL  High: >= 240 mg/dL    Triglycerides  Normal: < 150 mg/dL  Borderline High: 150 - 199 mg/dL  High: 200-499 mg/dL  Very High: >= 500 mg/dL    Direct Measure HDL  Female: >= 50 mg/dL   Male: >= 40 mg/dL    LDL Cholesterol  Desirable: < 100 mg/dL  Above Desirable: 100 - 129 mg/dL   Borderline High: 130 - 159 mg/dL   High:  160 - 189 mg/dL   Very High: >= 190 mg/dL    Non HDL Cholesterol  Desirable: < 130 mg/dL  Above Desirable: 130 - 159 mg/dL  Borderline High: 160 - 189 mg/dL  High: 190 - 219 mg/dL  Very High: >= 220 mg/dL   Comprehensive metabolic panel     Status: Abnormal   Result Value Ref Range    Sodium 140 135 - 145 mmol/L    Potassium 4.9 3.4 - 5.3 mmol/L    Carbon Dioxide (CO2) 26 22 - 29 mmol/L    Anion Gap 12 7 - 15 mmol/L    Urea Nitrogen 14.9 8.0 - 23.0 mg/dL    Creatinine 0.88 0.67 - 1.17 mg/dL    GFR Estimate >90 >60 mL/min/1.73m2    Calcium 9.7 8.8 - 10.4 mg/dL    Chloride 102 98 - 107 mmol/L    Glucose 135 (H) 70 - 99 mg/dL    Alkaline Phosphatase 92 40 - 150 U/L    AST 26 0 - 45 U/L    ALT 24 0 - 70 U/L    Protein Total 7.8 6.4 - 8.3 g/dL    Albumin 4.6 3.5 - 5.2 g/dL    Bilirubin Total 0.4 <=1.2 mg/dL    Patient Fasting > 8hrs? Yes    T4 free     Status: Normal   Result Value Ref Range    Free T4 1.13 0.90 - 1.70 ng/dL     UA is normal. TSH is elevated, Free T4 is normal. Glucose 135. LFTs normal. Lipids normal. Urine protein normal. A1c is well controlled. CBC normal.        The longitudinal plan of care for the diagnosis(es)/condition(s) as documented were addressed during this visit. Due to the added complexity in care, I will continue to support Vicente in the subsequent management and with ongoing continuity of care.     This patient has  "been using and benefiting from using CGM continuous glucose monitoring system. I therefore recommended CGM continuous glucose monitoring as part of their comprehensive diabetes treatment plan, in order to help them:    lower their HbA1c to target and/or maintain their HbA1c at target    to alert patient prior to acute complications of hyper/hypoglycemia    to detect hypoglycemia, and/or to reduce hypoglycemia    increase their short term and long-term safety    improve quality of life    start/continue/intensify level of physical activity safely    improve insulin sensitivity by reducing hypoglycemia unawareness.              BMI  Estimated body mass index is 28.24 kg/m  as calculated from the following:    Height as of 10/31/24: 1.765 m (5' 9.5\").    Weight as of this encounter: 88 kg (194 lb).         Return in about 3 months (around 5/11/2025) for - Labs every 91+ days, with DM Follow-up, Same Day or 1-2 days later with Dr. Wilson.      Moshe Wilson MD  [facility]    Review of Systems   Constitutional:  Negative for chills, fatigue and fever.   HENT:  Negative for congestion and hearing loss.    Eyes:  Negative for pain and visual disturbance.   Respiratory:  Negative for cough, shortness of breath and wheezing.    Cardiovascular:  Negative for chest pain and palpitations.   Gastrointestinal:  Negative for abdominal pain, diarrhea, nausea and vomiting.   Endocrine: Negative for cold intolerance and heat intolerance.        + Much less Hyperglycemia   Genitourinary:  Negative for dysuria and hematuria.   Musculoskeletal:  Negative for arthralgias and myalgias.        Right 4th finger, in palm. starting to develop trigger finger, improved with regular home therapy   Skin:  Negative for pallor.   Allergic/Immunologic: Negative for immunocompromised state.   Neurological:  Positive for numbness (+ bilateral feet, comes and goes -- better with better glucose control). Negative for dizziness " and light-headedness.   Hematological:  Does not bruise/bleed easily.   Psychiatric/Behavioral:  Negative for agitation and confusion.        Subjective   Vicente is a 70 year old, presenting for the following health issues:  Diabetes        2/11/2025     8:30 AM   Additional Questions   Roomed by SHAKIRA Castro   Accompanied by Self         2/11/2025     8:30 AM   Patient Reported Additional Medications   Patient reports taking the following new medications N/A     History of Present Illness       Hypertension: He presents for follow up of hypertension.  He does check blood pressure  regularly outside of the clinic. Outpatient blood pressures have not been over 140/90. He follows a low salt diet.     He eats 4 or more servings of fruits and vegetables daily.He consumes 0 sweetened beverage(s) daily.He exercises with enough effort to increase his heart rate 60 or more minutes per day.  He exercises with enough effort to increase his heart rate 7 days per week.   He is taking medications regularly.                     Objective    /84   Pulse 86   Temp 97.3  F (36.3  C) (Temporal)   Resp 16   Wt 88 kg (194 lb)   SpO2 98%   BMI 28.24 kg/m    Body mass index is 28.24 kg/m .  Physical Exam  Constitutional:       General: He is not in acute distress.     Appearance: He is well-developed. He is not diaphoretic.   Eyes:      General: No scleral icterus.     Conjunctiva/sclera: Conjunctivae normal.   Neck:      Vascular: No carotid bruit.   Cardiovascular:      Rate and Rhythm: Normal rate and regular rhythm.      Pulses: Normal pulses.   Pulmonary:      Effort: Pulmonary effort is normal.      Breath sounds: Normal breath sounds.   Abdominal:      Palpations: Abdomen is soft.      Tenderness: There is no abdominal tenderness.   Musculoskeletal:         General: No tenderness or deformity.      Cervical back: Neck supple.      Right lower leg: No edema.      Left lower leg: No edema.   Skin:     General: Skin is warm  and dry.      Findings: No rash.      Comments: Start of contracture of right hand - 4th finger.   Neurological:      Mental Status: He is alert. Mental status is at baseline.   Psychiatric:         Mood and Affect: Mood normal.         Behavior: Behavior normal.                    Signed Electronically by: Moshe Wilson MD

## 2025-02-13 RX ORDER — FLURBIPROFEN SODIUM 0.3 MG/ML
SOLUTION/ DROPS OPHTHALMIC DAILY
Qty: 200 EACH | Refills: 4 | Status: SHIPPED | OUTPATIENT
Start: 2025-02-13

## 2025-02-13 NOTE — TELEPHONE ENCOUNTER
[facility] sent Rx request for the following:      Requested Prescriptions   Pending Prescriptions Disp Refills    insulin pen needle (ULTIGUARD SAFEPACK PEN NEEDLE) 32G X 4 MM miscellaneous [Pharmacy Med Name: ULTICARE PEN NEEDLES 4MM] 200 each 4     Sig: INJECT SUBCUTANEOUS DAILY       Last Prescription Date:   12/26/2023  Last Fill Qty/Refills:         200, R-4    Last Office Visit:              02/11/2025   Future Office visit:           05/20/2025  Prescription approved per [facility] Refill Protocol.   Meri Draper RN on 2/13/2025 at 3:24 PM

## 2025-05-20 ENCOUNTER — TELEPHONE (OUTPATIENT)
Dept: INTERNAL MEDICINE | Facility: OTHER | Age: 70
End: 2025-05-20

## 2025-05-20 ENCOUNTER — RESULTS FOLLOW-UP (OUTPATIENT)
Dept: INTERNAL MEDICINE | Facility: OTHER | Age: 70
End: 2025-05-20

## 2025-05-20 ENCOUNTER — LAB (OUTPATIENT)
Dept: LAB | Facility: OTHER | Age: 70
End: 2025-05-20
Attending: INTERNAL MEDICINE
Payer: MEDICARE

## 2025-05-20 ENCOUNTER — OFFICE VISIT (OUTPATIENT)
Dept: INTERNAL MEDICINE | Facility: OTHER | Age: 70
End: 2025-05-20
Attending: INTERNAL MEDICINE
Payer: MEDICARE

## 2025-05-20 VITALS
DIASTOLIC BLOOD PRESSURE: 80 MMHG | RESPIRATION RATE: 16 BRPM | BODY MASS INDEX: 27.28 KG/M2 | SYSTOLIC BLOOD PRESSURE: 138 MMHG | OXYGEN SATURATION: 98 % | TEMPERATURE: 97.2 F | HEART RATE: 86 BPM | WEIGHT: 187.4 LBS

## 2025-05-20 DIAGNOSIS — E78.2 MIXED HYPERLIPIDEMIA: ICD-10-CM

## 2025-05-20 DIAGNOSIS — I10 BENIGN ESSENTIAL HYPERTENSION: ICD-10-CM

## 2025-05-20 DIAGNOSIS — Z12.5 ENCOUNTER FOR SCREENING FOR MALIGNANT NEOPLASM OF PROSTATE: ICD-10-CM

## 2025-05-20 DIAGNOSIS — E11.42 DIABETIC POLYNEUROPATHY ASSOCIATED WITH TYPE 2 DIABETES MELLITUS (H): ICD-10-CM

## 2025-05-20 DIAGNOSIS — E11.8 CONTROLLED TYPE 2 DIABETES MELLITUS WITH COMPLICATION, WITH LONG-TERM CURRENT USE OF INSULIN (H): ICD-10-CM

## 2025-05-20 DIAGNOSIS — E11.8 CONTROLLED TYPE 2 DIABETES MELLITUS WITH COMPLICATION, WITH LONG-TERM CURRENT USE OF INSULIN (H): Primary | ICD-10-CM

## 2025-05-20 DIAGNOSIS — Z79.4 CONTROLLED TYPE 2 DIABETES MELLITUS WITH COMPLICATION, WITH LONG-TERM CURRENT USE OF INSULIN (H): ICD-10-CM

## 2025-05-20 DIAGNOSIS — Z79.4 CONTROLLED TYPE 2 DIABETES MELLITUS WITH COMPLICATION, WITH LONG-TERM CURRENT USE OF INSULIN (H): Primary | ICD-10-CM

## 2025-05-20 LAB
ALBUMIN SERPL BCG-MCNC: 4.4 G/DL (ref 3.5–5.2)
ALBUMIN UR-MCNC: NEGATIVE MG/DL
ALP SERPL-CCNC: 88 U/L (ref 40–150)
ALT SERPL W P-5'-P-CCNC: 20 U/L (ref 0–70)
ANION GAP SERPL CALCULATED.3IONS-SCNC: 11 MMOL/L (ref 7–15)
APPEARANCE UR: CLEAR
AST SERPL W P-5'-P-CCNC: 24 U/L (ref 0–45)
BILIRUB SERPL-MCNC: 0.5 MG/DL
BILIRUB UR QL STRIP: NEGATIVE
BUN SERPL-MCNC: 18 MG/DL (ref 8–23)
CALCIUM SERPL-MCNC: 9.8 MG/DL (ref 8.8–10.4)
CHLORIDE SERPL-SCNC: 100 MMOL/L (ref 98–107)
CHOLEST SERPL-MCNC: 113 MG/DL
COLOR UR AUTO: YELLOW
CREAT SERPL-MCNC: 0.89 MG/DL (ref 0.67–1.17)
CREAT UR-MCNC: 26.2 MG/DL
EGFRCR SERPLBLD CKD-EPI 2021: >90 ML/MIN/1.73M2
ERYTHROCYTE [DISTWIDTH] IN BLOOD BY AUTOMATED COUNT: 14 % (ref 10–15)
EST. AVERAGE GLUCOSE BLD GHB EST-MCNC: 143 MG/DL
FASTING STATUS PATIENT QL REPORTED: NO
FASTING STATUS PATIENT QL REPORTED: NO
GLUCOSE SERPL-MCNC: 91 MG/DL (ref 70–99)
GLUCOSE UR STRIP-MCNC: NEGATIVE MG/DL
HBA1C MFR BLD: 6.6 %
HCO3 SERPL-SCNC: 26 MMOL/L (ref 22–29)
HCT VFR BLD AUTO: 43.7 % (ref 40–53)
HDLC SERPL-MCNC: 43 MG/DL
HGB BLD-MCNC: 15.2 G/DL (ref 13.3–17.7)
HGB UR QL STRIP: NEGATIVE
KETONES UR STRIP-MCNC: NEGATIVE MG/DL
LDLC SERPL CALC-MCNC: 62 MG/DL
LEUKOCYTE ESTERASE UR QL STRIP: NEGATIVE
MCH RBC QN AUTO: 30 PG (ref 26.5–33)
MCHC RBC AUTO-ENTMCNC: 34.8 G/DL (ref 31.5–36.5)
MCV RBC AUTO: 86 FL (ref 78–100)
MICROALBUMIN UR-MCNC: <12 MG/L
MICROALBUMIN/CREAT UR: NORMAL MG/G{CREAT}
NITRATE UR QL: NEGATIVE
NONHDLC SERPL-MCNC: 70 MG/DL
PH UR STRIP: 7.5 [PH] (ref 5–9)
PLATELET # BLD AUTO: 240 10E3/UL (ref 150–450)
POTASSIUM SERPL-SCNC: 4.5 MMOL/L (ref 3.4–5.3)
PROT SERPL-MCNC: 7.6 G/DL (ref 6.4–8.3)
PSA SERPL DL<=0.01 NG/ML-MCNC: 2.11 NG/ML (ref 0–6.5)
RBC # BLD AUTO: 5.07 10E6/UL (ref 4.4–5.9)
SODIUM SERPL-SCNC: 137 MMOL/L (ref 135–145)
SP GR UR STRIP: 1.01 (ref 1–1.03)
T4 FREE SERPL-MCNC: 1.17 NG/DL (ref 0.9–1.7)
TRIGL SERPL-MCNC: 38 MG/DL
TSH SERPL DL<=0.005 MIU/L-ACNC: 6.19 UIU/ML (ref 0.3–4.2)
UROBILINOGEN UR STRIP-MCNC: NORMAL MG/DL
WBC # BLD AUTO: 7.7 10E3/UL (ref 4–11)

## 2025-05-20 PROCEDURE — 84439 ASSAY OF FREE THYROXINE: CPT | Mod: ZL

## 2025-05-20 PROCEDURE — 84443 ASSAY THYROID STIM HORMONE: CPT | Mod: ZL

## 2025-05-20 PROCEDURE — 85048 AUTOMATED LEUKOCYTE COUNT: CPT | Mod: ZL

## 2025-05-20 PROCEDURE — 83036 HEMOGLOBIN GLYCOSYLATED A1C: CPT | Mod: ZL

## 2025-05-20 PROCEDURE — G0463 HOSPITAL OUTPT CLINIC VISIT: HCPCS

## 2025-05-20 PROCEDURE — 81003 URINALYSIS AUTO W/O SCOPE: CPT | Mod: ZL

## 2025-05-20 PROCEDURE — G0103 PSA SCREENING: HCPCS | Mod: ZL

## 2025-05-20 PROCEDURE — 82043 UR ALBUMIN QUANTITATIVE: CPT | Mod: ZL

## 2025-05-20 PROCEDURE — 80061 LIPID PANEL: CPT | Mod: ZL

## 2025-05-20 PROCEDURE — 36415 COLL VENOUS BLD VENIPUNCTURE: CPT | Mod: ZL

## 2025-05-20 PROCEDURE — 80053 COMPREHEN METABOLIC PANEL: CPT | Mod: ZL

## 2025-05-20 RX ORDER — GLIPIZIDE 10 MG/1
TABLET ORAL
Qty: 270 TABLET | Refills: 1 | Status: SHIPPED | OUTPATIENT
Start: 2025-05-20 | End: 2025-05-20

## 2025-05-20 RX ORDER — INSULIN GLARGINE 100 [IU]/ML
INJECTION, SOLUTION SUBCUTANEOUS
Qty: 15 ML | Refills: 1 | Status: SHIPPED | OUTPATIENT
Start: 2025-05-20

## 2025-05-20 RX ORDER — GLIPIZIDE 10 MG/1
TABLET ORAL
Qty: 270 TABLET | Refills: 1 | Status: SHIPPED | OUTPATIENT
Start: 2025-05-20

## 2025-05-20 ASSESSMENT — ENCOUNTER SYMPTOMS
HEMATURIA: 0
DIARRHEA: 0
ARTHRALGIAS: 0
SHORTNESS OF BREATH: 0
VOMITING: 0
NAUSEA: 0
DIZZINESS: 0
MYALGIAS: 0
AGITATION: 0
FEVER: 0
COUGH: 0
FATIGUE: 0
WHEEZING: 0
EYE PAIN: 0
CHILLS: 0
NUMBNESS: 1
DYSURIA: 0
ABDOMINAL PAIN: 0
PALPITATIONS: 0
LIGHT-HEADEDNESS: 0
BRUISES/BLEEDS EASILY: 0
CONFUSION: 0

## 2025-05-20 ASSESSMENT — PAIN SCALES - GENERAL: PAINLEVEL_OUTOF10: SEVERE PAIN (7)

## 2025-05-20 NOTE — TELEPHONE ENCOUNTER
"Received a fax from [facility] in regards to Rx for    Disp Refills Start End MARCELLO   glipiZIDE (GLUCOTROL) 10 MG tablet 270 tablet 1 5/20/2025 -- No   Sig - Route: Take 1 tablet (10 mg) by mouth 3 times daily (with meals) AND 1 tablet (10 mg) daily (with dinner). - total of 40 mg daily. - Oral     Note from pharmacy:  \" Please clarify directions\"    Called and spoke with Doyle and question does patient have 4 meals a day? If if to take 4 times daily quantity doesn't match.\"    Jessica Guillermo RN on 5/20/2025 at 9:41 AM    "

## 2025-05-20 NOTE — NURSING NOTE
Chief Complaint   Patient presents with    RECHECK     3 month diabetic check      Patient presents to the clinic today for a 3 month diabetic check     Previous A1C is at goal of <8  Lab Results   Component Value Date    A1C 6.6 05/20/2025    A1C 6.6 02/11/2025    A1C 6.6 10/31/2024    A1C 8.0 07/24/2024    A1C 7.8 04/10/2024    A1C 7.6 04/09/2021    A1C 7.5 12/09/2020    A1C 7.0 02/06/2020    A1C 7.4 11/06/2019    A1C 7.3 07/30/2019     Urine microalbumin:creatine:   Foot exam due  Eye exam Has appointment next week    Tobacco User no  Patient is on a daily aspirin  Patient is on a Statin.  Blood pressure today of:     BP Readings from Last 1 Encounters:   05/20/25 (!) 144/80      is not at the goal of <139/89 for diabetics.    Mickie Merrill LPN on 5/20/2025 at 7:55 AM

## 2025-05-20 NOTE — PATIENT INSTRUCTIONS
Blood pressure is well controlled.   Diabetes is well controlled.     Medications refilled.   Labs are stable.     Consider Pneumococcal vaccine x1 - anytime.     Aspects of Diabetes:   Recent Labs   Lab Test 05/20/25  0704 02/11/25  0811 10/31/24  0646   A1C 6.6* 6.6* 6.6*   LDL 62 66 62   HDL 43 46 45   TRIG 38 61 67   ALT 20 24 16   CR 0.89 0.88 0.87   GFRESTIMATED >90 >90 >90   POTASSIUM 4.5 4.9 4.5   TSH 6.19* 5.85* 6.05*   T4 1.17 1.13 1.15   WBC 7.7 7.3 8.0   HGB 15.2 15.5 15.3    240 265   ALBUMIN 4.4 4.6 4.5      Hemoglobin A1c  Goal range is under 8%. Best is 6.5 to 7   Blood Pressure 138/80 Goal to keep less than 140/90   Tobacco  reports that he has never smoked. He has never used smokeless tobacco. Goal to abstain from tobacco   Aspirin or Plavix Anti-platelet therapy Aspirin or Plavix reduces risk of heart disease and stroke  -- sometimes used with other blood thinners, depending on bleeding risk and risk factors.    ACE/ARB Specific blood pressure meds These medications can reduce risk of kidney disease   Cholesterol Statins (Lipitor, Crestor, vs others) Statins reduce risk of heart disease and stroke   Eye Exam -- Do Yearly -- Annual diabetic eye exam   Healthy weight Wt Readings from Last 4 Encounters:   05/20/25 85 kg (187 lb 6.4 oz)   02/11/25 88 kg (194 lb)   10/31/24 84.8 kg (187 lb)   07/24/24 87.5 kg (193 lb)      Body mass index is 27.28 kg/m .  Goal BMI under 30, best is under 25.      -- Trying to exercise daily (goal at least 20 min/day) with moderate aerobic activity   -- Eat healthy (resources from ADA at http://www.diabetes.org/)   -- Taking good care of my feet. Consider seeing the Podiatrist   -- Check blood sugars as directed, record in log book and bring to every appointment    Insurance companies are grading you and I on your blood sugar control -- Goal is to get your A1c down to 7.9% or lower and NO Smoking!  -- Medicare and most insurance companies, will only cover  Hemoglobin A1c labs to be rechecked every 91+ days.      Return for Diabetes labs and clinic follow-up appointment every 3 to 4 months.    Schedule lab only appointment --- A few days AFTER: 8/18/25   Schedule clinic appointment with Dr. Wilson -- Same day as labs, or 1-2 days later.

## 2025-05-20 NOTE — PROGRESS NOTES
Assessment & Plan     ICD-10-CM    1. Controlled type 2 diabetes mellitus with complication, with long-term current use of insulin (H)  E11.8 glipiZIDE (GLUCOTROL) 10 MG tablet    Z79.4 insulin glargine (LANTUS SOLOSTAR) 100 UNIT/ML pen     metFORMIN (GLUCOPHAGE) 1000 MG tablet     Urine Macroscopic with reflex to Microscopic     TSH with free T4 reflex     Hemoglobin A1c     CBC with platelets     Albumin Random Urine Quantitative with Creat Ratio     Comprehensive metabolic panel      2. Diabetic polyneuropathy associated with type 2 diabetes mellitus (H)  E11.42       3. Benign essential hypertension  I10       4. Mixed hyperlipidemia  E78.2 Lipid Profile      5. Encounter for screening for malignant neoplasm of prostate  Z12.5 Prostate Specific Antigen Screen         Patient presents for diabetic follow-up, as well as follow-up multiple issues.    Overall has been doing very well.  Hemoglobin A1c is well-controlled at 6.6%.  Continues with Lantus, currently using 18 units at bedtime.  Also taking glipizide and metformin.  Standing lab orders updated.  No medication side effects reported.  Dexcom G7 has been very helpful and effective.  Tolerating well.  Continue current use.  See below.    Diabetic polyneuropathy is ongoing.  Some numbness and burning.  Declines need for neuropathy medications.   encouraged to continue with positive lifestyle modifications.    Prostate lab cancer screening, check PSA level.    HYPERTENSION - Ongoing. Blood pressure is currently well controlled.  Medication side effects: None. Denies syncope or presyncope.  Continue current medications.   Medication list reviewed/updated. Refills completed as needed.      MIXED HYPERLIPIDEMIA.  Ongoing. LDL is at goal: Yes. Triglycerides are at goal: Yes.  Hopefully lifestyle modifications will improve cholesterol levels, otherwise will consider additional medication dose adjustments or medication changes.  Medication side effects reported: None.    Continue current medications for now. Medication list reviewed/updated. Refills completed as needed.  Recent Labs   Lab Test 05/20/25  0704 02/11/25  0811   CHOL 113 124   HDL 43 46   LDL 62 66   TRIG 38 61         Vaccine counseling completed.  Encourage routine / annual vaccinations.    Type 2 Diabetes Mellitus, with neuropathy, Reports ongoing/previous: numbness and burning.  Blood sugar control has been good with minimal hyperglycemia. Doing well with diet, oral agents, exercise, and insulin injections - x1 Insulin injections per day.  Medication list reviewed/updated. Refills completed as needed.    Complicating factors include but are not limited to: hypertension, hyperlipidemia, and neuropathy.     Recent Labs   Lab Test 05/20/25  0704 02/11/25  0811 10/31/24  0646   A1C 6.6* 6.6* 6.6*   LDL 62 66 62   HDL 43 46 45   TRIG 38 61 67   ALT 20 24 16   CR 0.89 0.88 0.87   GFRESTIMATED >90 >90 >90   POTASSIUM 4.5 4.9 4.5   TSH 6.19* 5.85* 6.05*   T4 1.17 1.13 1.15   WBC 7.7 7.3 8.0   HGB 15.2 15.5 15.3    240 265   ALBUMIN 4.4 4.6 4.5     Results for orders placed or performed in visit on 05/20/25   Urine Macroscopic with reflex to Microscopic     Status: Normal   Result Value Ref Range    Color Urine Yellow Colorless, Straw, Light Yellow, Yellow    Appearance Urine Clear Clear    Glucose Urine Negative Negative mg/dL    Bilirubin Urine Negative Negative    Ketones Urine Negative Negative mg/dL    Specific Gravity Urine 1.008 1.000 - 1.030    Blood Urine Negative Negative    pH Urine 7.5 5.0 - 9.0    Protein Albumin Urine Negative Negative mg/dL    Urobilinogen Urine Normal Normal mg/dL    Nitrite Urine Negative Negative    Leukocyte Esterase Urine Negative Negative    Narrative    Microscopic not indicated   TSH with free T4 reflex     Status: Abnormal   Result Value Ref Range    TSH 6.19 (H) 0.30 - 4.20 uIU/mL   Hemoglobin A1c     Status: Abnormal   Result Value Ref Range    Estimated Average Glucose 143  (H) <117 mg/dL    Hemoglobin A1C 6.6 (H) <5.7 %   CBC with platelets     Status: Normal   Result Value Ref Range    WBC Count 7.7 4.0 - 11.0 10e3/uL    RBC Count 5.07 4.40 - 5.90 10e6/uL    Hemoglobin 15.2 13.3 - 17.7 g/dL    Hematocrit 43.7 40.0 - 53.0 %    MCV 86 78 - 100 fL    MCH 30.0 26.5 - 33.0 pg    MCHC 34.8 31.5 - 36.5 g/dL    RDW 14.0 10.0 - 15.0 %    Platelet Count 240 150 - 450 10e3/uL   Albumin Random Urine Quantitative with Creat Ratio     Status: None   Result Value Ref Range    Creatinine Urine mg/dL 26.2 mg/dL    Albumin Urine mg/L <12.0 mg/L    Albumin Urine mg/g Cr     Lipid Profile     Status: None   Result Value Ref Range    Cholesterol 113 <200 mg/dL    Triglycerides 38 <150 mg/dL    Direct Measure HDL 43 >=40 mg/dL    LDL Cholesterol Calculated 62 <100 mg/dL    Non HDL Cholesterol 70 <130 mg/dL    Patient Fasting > 8hrs? No     Narrative    Cholesterol  Desirable: < 200 mg/dL  Borderline High: 200 - 239 mg/dL  High: >= 240 mg/dL    Triglycerides  Normal: < 150 mg/dL  Borderline High: 150 - 199 mg/dL  High: 200-499 mg/dL  Very High: >= 500 mg/dL    Direct Measure HDL  Female: >= 50 mg/dL   Male: >= 40 mg/dL    LDL Cholesterol  Desirable: < 100 mg/dL  Above Desirable: 100 - 129 mg/dL   Borderline High: 130 - 159 mg/dL   High:  160 - 189 mg/dL   Very High: >= 190 mg/dL    Non HDL Cholesterol  Desirable: < 130 mg/dL  Above Desirable: 130 - 159 mg/dL  Borderline High: 160 - 189 mg/dL  High: 190 - 219 mg/dL  Very High: >= 220 mg/dL   Comprehensive metabolic panel     Status: None   Result Value Ref Range    Sodium 137 135 - 145 mmol/L    Potassium 4.5 3.4 - 5.3 mmol/L    Carbon Dioxide (CO2) 26 22 - 29 mmol/L    Anion Gap 11 7 - 15 mmol/L    Urea Nitrogen 18.0 8.0 - 23.0 mg/dL    Creatinine 0.89 0.67 - 1.17 mg/dL    GFR Estimate >90 >60 mL/min/1.73m2    Calcium 9.8 8.8 - 10.4 mg/dL    Chloride 100 98 - 107 mmol/L    Glucose 91 70 - 99 mg/dL    Alkaline Phosphatase 88 40 - 150 U/L    AST 24 0 - 45  U/L    ALT 20 0 - 70 U/L    Protein Total 7.6 6.4 - 8.3 g/dL    Albumin 4.4 3.5 - 5.2 g/dL    Bilirubin Total 0.5 <=1.2 mg/dL    Patient Fasting > 8hrs? No    T4 free     Status: Normal   Result Value Ref Range    Free T4 1.17 0.90 - 1.70 ng/dL   Prostate Specific Antigen Screen     Status: Normal   Result Value Ref Range    Prostate Specific Antigen Screen 2.11 0.00 - 6.50 ng/mL    Narrative    This result is obtained using the Roche Elecsys total PSA method on the chela e411 immunoassay analyzer, which is an ultrasensitive method. Results obtained with different assay methods or kits cannot be used interchangeably.  This test is intended for initial prostate cancer screening. PSA values exceeding the age-specific limits are suspicious for prostate disease, but additional testing, such as prostate biopsy, is needed to diagnose prostate pathology. The American Cancer Society recommends annual examination with digital rectal examination and serum PSA beginning at age 50 and for men with a life expectancy of at least 10 years after detection of prostate cancer. For men in high-risk groups, such as  Americans or men with a first-degree relative diagnosed at a younger age, testing should begin at a younger age. It is generally recommended that information be provided to patients about the benefits and limitations of testing and treatment so they can make informed decisions.      PSA is within normal limits.  Free T4 is within normal limits.  TSH is elevated.  Comprehensive metabolic panel is all normal.  Liver enzymes are normal.  Creatinine 0.89 with GFR greater than 90.  Potassium normal.  Cholesterol levels are at goal.  Urine microalbumin is within normal limits.  CBC normal.  Urinalysis is normal.  Hemoglobin A1c is well-controlled.    The longitudinal plan of care for the diagnosis(es)/condition(s) as documented were addressed during this visit. Due to the added complexity in care, I will continue to  "support Vicente in the subsequent management and with ongoing continuity of care.     This patient has been using and benefiting from using CGM continuous glucose monitoring system. I therefore recommended CGM continuous glucose monitoring as part of their comprehensive diabetes treatment plan, in order to help them:    lower their HbA1c to target and/or maintain their HbA1c at target    to alert patient prior to acute complications of hyper/hypoglycemia    to detect hypoglycemia, and/or to reduce hypoglycemia    increase their short term and long-term safety    improve quality of life    start/continue/intensify level of physical activity safely    improve insulin sensitivity by reducing hypoglycemia unawareness.              BMI  Estimated body mass index is 27.28 kg/m  as calculated from the following:    Height as of 10/31/24: 1.765 m (5' 9.5\").    Weight as of this encounter: 85 kg (187 lb 6.4 oz).         Return in about 3 months (around 8/20/2025) for Annual Medicare Wellness Visit, Diabetes Follow-up, + Get Diabetic labs prior to clinic appointment.      Moshe Wilson MD  [facility]    Review of Systems   Constitutional:  Negative for chills, fatigue and fever.   HENT:  Negative for congestion and hearing loss.    Eyes:  Negative for pain and visual disturbance.   Respiratory:  Negative for cough, shortness of breath and wheezing.    Cardiovascular:  Negative for chest pain and palpitations.   Gastrointestinal:  Negative for abdominal pain, diarrhea, nausea and vomiting.   Endocrine: Negative for cold intolerance and heat intolerance.        + Much less Hyperglycemia   Genitourinary:  Negative for dysuria and hematuria.   Musculoskeletal:  Negative for arthralgias and myalgias.        Right 4th finger, in palm. starting to develop trigger finger, improved with regular home therapy   Skin:  Negative for pallor.   Allergic/Immunologic: Negative for immunocompromised state.   Neurological:  " Positive for numbness (+ bilateral feet, comes and goes -- better with better glucose control). Negative for dizziness and light-headedness.   Hematological:  Does not bruise/bleed easily.   Psychiatric/Behavioral:  Negative for agitation and confusion.        Subjective   Vicente is a 70 year old, presenting for the following health issues:  RECHECK (3 month diabetic check )        5/20/2025     7:48 AM   Additional Questions   Roomed by Mickie ROSENBERG     History of Present Illness       He eats 4 or more servings of fruits and vegetables daily.He consumes 0 sweetened beverage(s) daily.He exercises with enough effort to increase his heart rate 60 or more minutes per day.  He exercises with enough effort to increase his heart rate 7 days per week.   He is taking medications regularly.                      Objective    /80 (BP Location: Right arm, Patient Position: Sitting, Cuff Size: Adult Regular)   Pulse 86   Temp 97.2  F (36.2  C) (Temporal)   Resp 16   Wt 85 kg (187 lb 6.4 oz)   SpO2 98%   BMI 27.28 kg/m    Body mass index is 27.28 kg/m .  Physical Exam  Constitutional:       General: He is not in acute distress.     Appearance: Normal appearance. He is well-developed. He is not diaphoretic.   Eyes:      General: No scleral icterus.     Conjunctiva/sclera: Conjunctivae normal.   Neck:      Vascular: No carotid bruit.   Cardiovascular:      Rate and Rhythm: Normal rate and regular rhythm.      Pulses: Normal pulses.   Pulmonary:      Effort: Pulmonary effort is normal.      Breath sounds: Normal breath sounds.   Abdominal:      Palpations: Abdomen is soft.      Tenderness: There is no abdominal tenderness.   Musculoskeletal:         General: No tenderness or deformity.      Cervical back: Neck supple.      Right lower leg: No edema.      Left lower leg: No edema.   Skin:     General: Skin is warm and dry.      Findings: No rash.      Comments: Start of contracture of right hand - 4th finger.   Neurological:       Mental Status: He is alert. Mental status is at baseline.   Psychiatric:         Mood and Affect: Mood normal.         Behavior: Behavior normal.                    Signed Electronically by: Moshe Wilson MD